# Patient Record
Sex: FEMALE | Race: WHITE | NOT HISPANIC OR LATINO | Employment: OTHER | URBAN - METROPOLITAN AREA
[De-identification: names, ages, dates, MRNs, and addresses within clinical notes are randomized per-mention and may not be internally consistent; named-entity substitution may affect disease eponyms.]

---

## 2017-05-23 ENCOUNTER — HOSPITAL ENCOUNTER (OUTPATIENT)
Dept: OTHER | Facility: HOSPITAL | Age: 67
Discharge: HOME | End: 2017-05-23
Attending: INTERNAL MEDICINE | Admitting: INTERNAL MEDICINE

## 2017-05-23 LAB
ANION GAP SERPL CALC-SCNC: 13.2 MMOL/L (ref 10–20)
BASOPHILS # BLD AUTO: 0.1 10*3/UL (ref 0–0.2)
BASOPHILS NFR BLD AUTO: 1 % (ref 0–2)
BUN SERPL-MCNC: 11 MG/DL (ref 8–20)
BUN/CREAT SERPL: 12.2 (ref 5.4–26.2)
CALCIUM SERPL-MCNC: 9.1 MG/DL (ref 8.9–10.3)
CHLORIDE SERPL-SCNC: 102 MMOL/L (ref 101–111)
CONV CO2: 26 MMOL/L (ref 22–32)
CREAT UR-MCNC: 0.9 MG/DL (ref 0.4–1)
DIFFERENTIAL METHOD BLD: (no result)
EOSINOPHIL # BLD AUTO: 0.1 10*3/UL (ref 0–0.3)
EOSINOPHIL # BLD AUTO: 2 % (ref 0–3)
ERYTHROCYTE [DISTWIDTH] IN BLOOD BY AUTOMATED COUNT: 14 % (ref 11.5–14.5)
GLUCOSE SERPL-MCNC: 132 MG/DL (ref 65–99)
HCT VFR BLD AUTO: 39.7 % (ref 35–49)
HGB BLD-MCNC: 13.2 G/DL (ref 12–15)
INR PPP: 1.2
INR PPP: 1.2 (ref 2–3)
LYMPHOCYTES # BLD AUTO: 1.5 10*3/UL (ref 0.8–4.8)
LYMPHOCYTES NFR BLD AUTO: 18 % (ref 18–42)
MCH RBC QN AUTO: 30.3 PG (ref 26–32)
MCHC RBC AUTO-ENTMCNC: 33.4 G/DL (ref 32–36)
MCV RBC AUTO: 90.9 FL (ref 80–94)
MONOCYTES # BLD AUTO: 0.9 10*3/UL (ref 0.1–1.3)
MONOCYTES NFR BLD AUTO: 11 % (ref 2–11)
NEUTROPHILS # BLD AUTO: 5.8 10*3/UL (ref 2.3–8.6)
NEUTROPHILS NFR BLD AUTO: 68 % (ref 50–75)
NRBC BLD AUTO-RTO: 0 /100{WBCS}
NRBC/RBC NFR BLD MANUAL: 0 10*3/UL
PLATELET # BLD AUTO: 271 10*3/UL (ref 150–450)
PMV BLD AUTO: 7.9 FL (ref 7.4–10.4)
POTASSIUM SERPL-SCNC: 4.2 MMOL/L (ref 3.6–5.1)
PROTHROMBIN TIME: 13.6 SEC (ref 19.4–28.5)
PROTHROMBIN TIME: 13.6 SEC (ref 9.6–11.7)
RBC # BLD AUTO: 4.37 10*6/UL (ref 4–5.4)
SODIUM SERPL-SCNC: 137 MMOL/L (ref 136–144)
WBC # BLD AUTO: 8.5 10*3/UL (ref 4.5–11.5)

## 2017-05-25 ENCOUNTER — OUTSIDE FACILITY SERVICE (OUTPATIENT)
Dept: CARDIAC SURGERY | Facility: CLINIC | Age: 67
End: 2017-05-25

## 2017-05-25 PROCEDURE — OUTSIDEPOS PR OUTSIDE POS PLACEHOLDER: Performed by: THORACIC SURGERY (CARDIOTHORACIC VASCULAR SURGERY)

## 2017-06-27 ENCOUNTER — TELEPHONE (OUTPATIENT)
Dept: CARDIAC SURGERY | Facility: CLINIC | Age: 67
End: 2017-06-27

## 2017-06-27 NOTE — TELEPHONE ENCOUNTER
Mrs Deleon's daughter called to report that her mother is running a low grade fever and is sick at her stomach. Her PCP is out of town and she does not wish to go to the emergency room. I suggested that they go to a local urgent care and at least have labs drawn. The family thinks it is her allergies but they do acknowledge these have not caused her to be sick at her stomach in the past. They will go to a local urgent care center today

## 2017-07-17 ENCOUNTER — OFFICE VISIT (OUTPATIENT)
Dept: CARDIAC SURGERY | Facility: CLINIC | Age: 67
End: 2017-07-17

## 2017-07-17 VITALS
DIASTOLIC BLOOD PRESSURE: 86 MMHG | OXYGEN SATURATION: 95 % | BODY MASS INDEX: 37.39 KG/M2 | TEMPERATURE: 97.6 F | RESPIRATION RATE: 20 BRPM | SYSTOLIC BLOOD PRESSURE: 141 MMHG | WEIGHT: 211 LBS | HEART RATE: 88 BPM | HEIGHT: 63 IN

## 2017-07-17 DIAGNOSIS — Z86.79 S/P MAZE OPERATION FOR ATRIAL FIBRILLATION: Primary | ICD-10-CM

## 2017-07-17 DIAGNOSIS — Z98.890 S/P MAZE OPERATION FOR ATRIAL FIBRILLATION: Primary | ICD-10-CM

## 2017-07-17 DIAGNOSIS — Z95.1 H/O CORONARY ARTERY BYPASS SURGERY: ICD-10-CM

## 2017-07-17 PROCEDURE — 99024 POSTOP FOLLOW-UP VISIT: CPT | Performed by: THORACIC SURGERY (CARDIOTHORACIC VASCULAR SURGERY)

## 2017-07-17 RX ORDER — OMEGA-3/DHA/EPA/FISH OIL 60 MG-90MG
500 CAPSULE ORAL
COMMUNITY

## 2017-07-17 RX ORDER — CARVEDILOL 25 MG/1
25 TABLET ORAL 2 TIMES DAILY WITH MEALS
COMMUNITY
End: 2020-01-04

## 2017-07-17 RX ORDER — DIGOXIN 125 MCG
125 TABLET ORAL
COMMUNITY
End: 2019-09-13

## 2017-07-17 RX ORDER — CETIRIZINE HYDROCHLORIDE 5 MG/1
10 TABLET ORAL DAILY
COMMUNITY

## 2017-07-17 RX ORDER — ASPIRIN 81 MG/1
81 TABLET ORAL DAILY
COMMUNITY

## 2017-07-17 RX ORDER — WARFARIN SODIUM 5 MG/1
5 TABLET ORAL
COMMUNITY
End: 2019-09-12

## 2017-07-17 RX ORDER — ALBUTEROL SULFATE 90 UG/1
2 AEROSOL, METERED RESPIRATORY (INHALATION) EVERY 4 HOURS PRN
COMMUNITY
End: 2020-09-10

## 2017-07-17 RX ORDER — AMLODIPINE BESYLATE 5 MG/1
5 TABLET ORAL DAILY
COMMUNITY
End: 2019-08-07 | Stop reason: SDUPTHER

## 2017-07-17 RX ORDER — GINKGO BILOBA LEAF EXTRACT 60 MG
CAPSULE ORAL
COMMUNITY
End: 2017-07-17 | Stop reason: ALTCHOICE

## 2017-07-17 RX ORDER — ATORVASTATIN CALCIUM 20 MG/1
20 TABLET, FILM COATED ORAL DAILY
COMMUNITY
End: 2019-09-12

## 2017-07-17 NOTE — PROGRESS NOTES
Mary Deleon is a 66 y.o. female s/p CABG/Maze. She denies any signs or symptoms of myocardial ischemi or cerebrovascular event. She did suffer a recent bout of pneumonia (requiring hospitalization), as well as a severe UTI. She has now completed her antibiotics. She is still on supplemental Oxygen.    Sternum is stable, incision is clean, dry, and intact.   CTA B/L.   Lower extremity incisions are clean, dry and intact.  GCS 15, no neurologic deficit.    She appears to be doing well. She plans to follow up with Dr. Powers, Dr. Jean Baptiste, and Dr. Issa.    Follow up with us will be on a PRN basis.

## 2017-08-10 ENCOUNTER — HOSPITAL ENCOUNTER (OUTPATIENT)
Dept: GENERAL RADIOLOGY | Facility: HOSPITAL | Age: 67
Discharge: HOME | End: 2017-08-10
Attending: INTERNAL MEDICINE | Admitting: INTERNAL MEDICINE

## 2017-08-15 ENCOUNTER — HOSPITAL ENCOUNTER (OUTPATIENT)
Dept: SLEEP MEDICINE | Facility: HOSPITAL | Age: 67
Discharge: HOME | End: 2017-08-15
Attending: INTERNAL MEDICINE | Admitting: INTERNAL MEDICINE

## 2018-07-05 ENCOUNTER — HOSPITAL ENCOUNTER (OUTPATIENT)
Dept: OTHER | Facility: HOSPITAL | Age: 68
Discharge: HOME | End: 2018-07-05
Attending: ORTHOPAEDIC SURGERY | Admitting: ORTHOPAEDIC SURGERY

## 2018-12-31 ENCOUNTER — HOSPITAL ENCOUNTER (OUTPATIENT)
Dept: PREADMISSION TESTING | Facility: HOSPITAL | Age: 68
Discharge: HOME | End: 2018-12-31
Attending: INTERNAL MEDICINE | Admitting: INTERNAL MEDICINE

## 2018-12-31 LAB
ALBUMIN SERPL-MCNC: 3.6 G/DL (ref 3.5–4.8)
ALBUMIN/GLOB SERPL: 1.1 {RATIO} (ref 1–1.7)
ALP SERPL-CCNC: 48 IU/L (ref 32–91)
ALT SERPL-CCNC: 25 IU/L (ref 14–54)
ANION GAP SERPL CALC-SCNC: 14.2 MMOL/L (ref 10–20)
AST SERPL-CCNC: 21 IU/L (ref 15–41)
BASOPHILS # BLD AUTO: 0.1 10*3/UL (ref 0–0.2)
BASOPHILS NFR BLD AUTO: 1 % (ref 0–2)
BILIRUB SERPL-MCNC: 0.6 MG/DL (ref 0.3–1.2)
BUN SERPL-MCNC: 17 MG/DL (ref 8–20)
BUN/CREAT SERPL: 18.9 (ref 5.4–26.2)
CALCIUM SERPL-MCNC: 9.2 MG/DL (ref 8.9–10.3)
CHLORIDE SERPL-SCNC: 103 MMOL/L (ref 101–111)
CONV CO2: 25 MMOL/L (ref 22–32)
CONV TOTAL PROTEIN: 6.8 G/DL (ref 6.1–7.9)
CREAT UR-MCNC: 0.9 MG/DL (ref 0.4–1)
DIFFERENTIAL METHOD BLD: (no result)
EOSINOPHIL # BLD AUTO: 0.1 10*3/UL (ref 0–0.3)
EOSINOPHIL # BLD AUTO: 1 % (ref 0–3)
ERYTHROCYTE [DISTWIDTH] IN BLOOD BY AUTOMATED COUNT: 13.4 % (ref 11.5–14.5)
GLOBULIN UR ELPH-MCNC: 3.2 G/DL (ref 2.5–3.8)
GLUCOSE SERPL-MCNC: 104 MG/DL (ref 65–99)
HCT VFR BLD AUTO: 40.6 % (ref 35–49)
HGB BLD-MCNC: 13.4 G/DL (ref 12–15)
INR PPP: 1.7 (ref 2–3)
LYMPHOCYTES # BLD AUTO: 1.5 10*3/UL (ref 0.8–4.8)
LYMPHOCYTES NFR BLD AUTO: 17 % (ref 18–42)
MAGNESIUM SERPL-MCNC: 2 MG/DL (ref 1.8–2.5)
MCH RBC QN AUTO: 30.6 PG (ref 26–32)
MCHC RBC AUTO-ENTMCNC: 33.1 G/DL (ref 32–36)
MCV RBC AUTO: 92.4 FL (ref 80–94)
MONOCYTES # BLD AUTO: 0.9 10*3/UL (ref 0.1–1.3)
MONOCYTES NFR BLD AUTO: 11 % (ref 2–11)
NEUTROPHILS # BLD AUTO: 6.3 10*3/UL (ref 2.3–8.6)
NEUTROPHILS NFR BLD AUTO: 70 % (ref 50–75)
NRBC BLD AUTO-RTO: 0 /100{WBCS}
NRBC/RBC NFR BLD MANUAL: 0 10*3/UL
PLATELET # BLD AUTO: 255 10*3/UL (ref 150–450)
PMV BLD AUTO: 8 FL (ref 7.4–10.4)
POTASSIUM SERPL-SCNC: 4.2 MMOL/L (ref 3.6–5.1)
PROTHROMBIN TIME: 16.8 SEC (ref 19.4–28.5)
RBC # BLD AUTO: 4.39 10*6/UL (ref 4–5.4)
SODIUM SERPL-SCNC: 138 MMOL/L (ref 136–144)
TSH SERPL-ACNC: 4.8 UIU/ML (ref 0.34–5.6)
WBC # BLD AUTO: 9 10*3/UL (ref 4.5–11.5)

## 2019-04-01 ENCOUNTER — HOSPITAL ENCOUNTER (OUTPATIENT)
Dept: RHEUMATOLOGY | Facility: CLINIC | Age: 69
Discharge: HOME | End: 2019-04-01
Attending: INTERNAL MEDICINE | Admitting: INTERNAL MEDICINE

## 2019-04-05 ENCOUNTER — HOSPITAL ENCOUNTER (OUTPATIENT)
Dept: LAB | Facility: HOSPITAL | Age: 69
Discharge: HOME | End: 2019-04-05
Attending: INTERNAL MEDICINE | Admitting: INTERNAL MEDICINE

## 2019-04-05 LAB
ALBUMIN SERPL-MCNC: 3.7 G/DL (ref 3.5–4.8)
ALBUMIN/GLOB SERPL: 1.2 {RATIO} (ref 1–1.7)
ALP SERPL-CCNC: 55 IU/L (ref 32–91)
ALT SERPL-CCNC: 27 IU/L (ref 14–54)
ANION GAP SERPL CALC-SCNC: 16.1 MMOL/L (ref 10–20)
AST SERPL-CCNC: 21 IU/L (ref 15–41)
BASOPHILS # BLD AUTO: 0.2 10*3/UL (ref 0–0.2)
BASOPHILS NFR BLD AUTO: 3 % (ref 0–2)
BILIRUB SERPL-MCNC: 0.5 MG/DL (ref 0.3–1.2)
BILIRUB UR QL STRIP: NEGATIVE MG/DL
BUN SERPL-MCNC: 17 MG/DL (ref 8–20)
BUN/CREAT SERPL: 18.9 (ref 5.4–26.2)
CALCIUM SERPL-MCNC: 9.3 MG/DL (ref 8.9–10.3)
CASTS URNS QL MICRO: NORMAL /[LPF]
CHLORIDE SERPL-SCNC: 100 MMOL/L (ref 101–111)
COLOR UR: YELLOW
CONV BACTERIA IN URINE MICRO: NEGATIVE
CONV CLARITY OF URINE: CLEAR
CONV CO2: 26 MMOL/L (ref 22–32)
CONV HYALINE CASTS IN URINE MICRO: 1 /[LPF] (ref 0–5)
CONV PROTEIN IN URINE BY AUTOMATED TEST STRIP: NEGATIVE MG/DL
CONV SMALL ROUND CELLS: NORMAL /[HPF]
CONV TOTAL PROTEIN: 6.9 G/DL (ref 6.1–7.9)
CONV UROBILINOGEN IN URINE BY AUTOMATED TEST STRIP: 0.2 MG/DL
CREAT UR-MCNC: 0.9 MG/DL (ref 0.4–1)
CRP SERPL-MCNC: 0.12 MG/DL (ref 0–0.7)
CULTURE INDICATED?: NORMAL
DIFFERENTIAL METHOD BLD: (no result)
EOSINOPHIL # BLD AUTO: 0.1 10*3/UL (ref 0–0.3)
EOSINOPHIL # BLD AUTO: 2 % (ref 0–3)
ERYTHROCYTE [DISTWIDTH] IN BLOOD BY AUTOMATED COUNT: 15.5 % (ref 11.5–14.5)
ERYTHROCYTE [SEDIMENTATION RATE] IN BLOOD BY WESTERGREN METHOD: NORMAL MM/HR (ref 0–30)
GLOBULIN UR ELPH-MCNC: 3.2 G/DL (ref 2.5–3.8)
GLUCOSE SERPL-MCNC: 129 MG/DL (ref 65–99)
GLUCOSE UR QL: NEGATIVE MG/DL
HCT VFR BLD AUTO: 40.3 % (ref 35–49)
HGB BLD-MCNC: 13.1 G/DL (ref 12–15)
HGB UR QL STRIP: NEGATIVE
KETONES UR QL STRIP: NEGATIVE MG/DL
LEUKOCYTE ESTERASE UR QL STRIP: NEGATIVE
LYMPHOCYTES # BLD AUTO: 1.8 10*3/UL (ref 0.8–4.8)
LYMPHOCYTES NFR BLD AUTO: 25 % (ref 18–42)
MCH RBC QN AUTO: 29.6 PG (ref 26–32)
MCHC RBC AUTO-ENTMCNC: 32.6 G/DL (ref 32–36)
MCV RBC AUTO: 90.8 FL (ref 80–94)
MONOCYTES # BLD AUTO: 0.7 10*3/UL (ref 0.1–1.3)
MONOCYTES NFR BLD AUTO: 10 % (ref 2–11)
NEUTROPHILS # BLD AUTO: 4.4 10*3/UL (ref 2.3–8.6)
NEUTROPHILS NFR BLD AUTO: 60 % (ref 50–75)
NITRITE UR QL STRIP: NEGATIVE
NRBC BLD AUTO-RTO: 0 /100{WBCS}
NRBC/RBC NFR BLD MANUAL: 0 10*3/UL
PH UR STRIP.AUTO: 7 [PH] (ref 4.5–8)
PLATELET # BLD AUTO: 273 10*3/UL (ref 150–450)
PMV BLD AUTO: 8.5 FL (ref 7.4–10.4)
POTASSIUM SERPL-SCNC: 4.1 MMOL/L (ref 3.6–5.1)
RBC # BLD AUTO: 4.44 10*6/UL (ref 4–5.4)
RBC #/AREA URNS HPF: 1 /[HPF] (ref 0–3)
SODIUM SERPL-SCNC: 138 MMOL/L (ref 136–144)
SP GR UR: 1.02 (ref 1–1.03)
SPERM URNS QL MICRO: NORMAL /[HPF]
SQUAMOUS SPT QL MICRO: 1 /[HPF] (ref 0–5)
UNIDENT CRYS URNS QL MICRO: NORMAL /[HPF]
WBC # BLD AUTO: 7.2 10*3/UL (ref 4.5–11.5)
WBC #/AREA URNS HPF: 1 /[HPF] (ref 0–5)
YEAST SPEC QL WET PREP: NORMAL /[HPF]

## 2019-04-08 LAB — CCP IGG ANTIBODIES: <0.4 U/ML

## 2019-04-22 ENCOUNTER — HOSPITAL ENCOUNTER (OUTPATIENT)
Dept: PREADMISSION TESTING | Facility: HOSPITAL | Age: 69
Discharge: HOME | End: 2019-04-22
Attending: INTERNAL MEDICINE | Admitting: INTERNAL MEDICINE

## 2019-04-22 LAB
ALBUMIN SERPL-MCNC: 3.7 G/DL (ref 3.5–4.8)
ALBUMIN/GLOB SERPL: 1.2 {RATIO} (ref 1–1.7)
ALP SERPL-CCNC: 49 IU/L (ref 32–91)
ALT SERPL-CCNC: 19 IU/L (ref 14–54)
ANION GAP SERPL CALC-SCNC: 14.2 MMOL/L (ref 10–20)
AST SERPL-CCNC: 21 IU/L (ref 15–41)
BASOPHILS # BLD AUTO: 0.1 10*3/UL (ref 0–0.2)
BASOPHILS NFR BLD AUTO: 1 % (ref 0–2)
BILIRUB SERPL-MCNC: 0.5 MG/DL (ref 0.3–1.2)
BUN SERPL-MCNC: 21 MG/DL (ref 8–20)
BUN/CREAT SERPL: 23.3 (ref 5.4–26.2)
CALCIUM SERPL-MCNC: 9.3 MG/DL (ref 8.9–10.3)
CHLORIDE SERPL-SCNC: 100 MMOL/L (ref 101–111)
CONV CO2: 27 MMOL/L (ref 22–32)
CONV TOTAL PROTEIN: 6.8 G/DL (ref 6.1–7.9)
CREAT UR-MCNC: 0.9 MG/DL (ref 0.4–1)
DIFFERENTIAL METHOD BLD: (no result)
EOSINOPHIL # BLD AUTO: 0.1 10*3/UL (ref 0–0.3)
EOSINOPHIL # BLD AUTO: 1 % (ref 0–3)
ERYTHROCYTE [DISTWIDTH] IN BLOOD BY AUTOMATED COUNT: 15.3 % (ref 11.5–14.5)
GLOBULIN UR ELPH-MCNC: 3.1 G/DL (ref 2.5–3.8)
GLUCOSE SERPL-MCNC: 86 MG/DL (ref 65–99)
HCT VFR BLD AUTO: 40.6 % (ref 35–49)
HGB BLD-MCNC: 13.5 G/DL (ref 12–15)
INR PPP: 1.5 (ref 2–3)
LYMPHOCYTES # BLD AUTO: 2 10*3/UL (ref 0.8–4.8)
LYMPHOCYTES NFR BLD AUTO: 27 % (ref 18–42)
MCH RBC QN AUTO: 30 PG (ref 26–32)
MCHC RBC AUTO-ENTMCNC: 33.2 G/DL (ref 32–36)
MCV RBC AUTO: 90.4 FL (ref 80–94)
MONOCYTES # BLD AUTO: 0.9 10*3/UL (ref 0.1–1.3)
MONOCYTES NFR BLD AUTO: 12 % (ref 2–11)
NEUTROPHILS # BLD AUTO: 4.3 10*3/UL (ref 2.3–8.6)
NEUTROPHILS NFR BLD AUTO: 59 % (ref 50–75)
NRBC BLD AUTO-RTO: 0 /100{WBCS}
NRBC/RBC NFR BLD MANUAL: 0 10*3/UL
PLATELET # BLD AUTO: 272 10*3/UL (ref 150–450)
PMV BLD AUTO: 7.7 FL (ref 7.4–10.4)
POTASSIUM SERPL-SCNC: 4.2 MMOL/L (ref 3.6–5.1)
PROTHROMBIN TIME: 15.1 SEC (ref 19.4–28.5)
RBC # BLD AUTO: 4.49 10*6/UL (ref 4–5.4)
SODIUM SERPL-SCNC: 137 MMOL/L (ref 136–144)
WBC # BLD AUTO: 7.3 10*3/UL (ref 4.5–11.5)

## 2019-05-30 ENCOUNTER — CONVERSION ENCOUNTER (OUTPATIENT)
Dept: CARDIOLOGY | Facility: CLINIC | Age: 69
End: 2019-05-30

## 2019-06-04 VITALS
SYSTOLIC BLOOD PRESSURE: 124 MMHG | BODY MASS INDEX: 35.96 KG/M2 | WEIGHT: 203 LBS | DIASTOLIC BLOOD PRESSURE: 77 MMHG | OXYGEN SATURATION: 96 % | HEART RATE: 67 BPM

## 2019-06-06 NOTE — PROGRESS NOTES
Visit Type:  Follow-up Visit  Primary Care Provider:  Yuliana Issa NP    Chief Complaint:  Follow-Up afer Ablation.    History of Present Illness:  68 -year-old white female patient with known history of obesity,  hypertension and dyslipidemia,  most probably chronic atrial fibrillation,  underwent stress Myoview 2016 showed no ischemia     echocardiogram 2016 showed LV function is normal   borderline biatrial enlargement   mitral annular calcification with mild mitral insufficiency   no aortic stenosis   no pulmonary hypertension    Due to the recurrent symptoms patient underwent right and left cardiac catheterization 2017  Patient found to have proximal LAD 70% mid LAD 70% obtuse marginal branch ostium 80% RCA ostium 80%  Patient underwent lima to LAD vein graft to the PDA and obtuse marginal branch Maze procedure    Patient was started on amiodarone and underwent cardioversion   patient went into sinus rhythm but significant sinus bradycardia so she stopped the amiodarone and digoxin  Due to recurrent AFib patient underwent ablation 2nd time in 2019   transesophageal echocardiogram was done  2019   showed moderate LVH bilateral atrial enlargement normal EF   left atrial appendage is patent   recent EKG normal sinus rhythm patient is maintaining sinus rhythm   patient is doing much better   advise patient to control the sleep apnea aggressively                  Vital Signs:    Patient Profile:    68 Years Old Female  Height:     63 inches (160.02 cm)  Weight:     203 pounds  (Measured Weight:  203 lbs.  oz.)  BMI:        35.96  Pulse rate: 67 / minute  O2 Sat:     96 %  Room Air:   room air without exertion    BP sittin / 77  (left arm)  Cuff size:  regular   Vitals Entered By: Dolly Murrieta CMA (May 30, 2019 11:14 AM)    Medications:  Medications were reviewed with the patient during this visit.    Allergies:   SULFA (Critical)  NAPROSYN (Critical)  * TYLENOL  (Critical)    Allergies were reviewed with the patient during this visit.    Current Allergies (reviewed today):  SULFA (Critical)  NAPROSYN (Critical)  * TYLENOL (Critical)    Current Medications (including medications started today):   METOPROLOL SUCCINATE ER 25 MG ORAL TABLET EXTENDED RELEASE 24 HOUR (METOPROLOL SUCCINATE) Take one  tablet by mouth twice daily  KEFLEX 500 MG ORAL CAPSULE (CEPHALEXIN) 1 tab every 8 hours untile gone.  BREO ELLIPTA 100-25 MCG/INH INHALATION AEROSOL POWDER BREATH ACTIVATED (FLUTICASONE FUROATE-VILANTEROL) One inhalation once daily  ZYRTEC ALLERGY CAPSULE (CETIRIZINE HCL CAPS) prn  METFORMIN  MG ORAL TABLET (METFORMIN HCL) Take one (1) tablet by mouth twice a day  SPIRIVA HANDIHALER 18 MCG INHALATION CAPSULE (TIOTROPIUM BROMIDE MONOHYDRATE) as directed  COLACE CAPSULE (DOCUSATE SODIUM CAPS) as directed  WARFARIN SODIUM 3 MG ORAL TABLET (WARFARIN SODIUM) Take 1 tablet by mouth Sun, Thur, and Sat  FISH OIL 1000 MG ORAL CAPSULE (OMEGA-3 FATTY ACIDS) Take 1 tablet by mouth daily  ATORVASTATIN 40MG   TAB (ATORVASTATIN CALCIUM) TAKE 1 TABLET BY MOUTH AT BEDTIME  WARFARIN SODIUM 4 MG ORAL TABLET (WARFARIN SODIUM) All other days as directed  AMLODIPINE BESYLATE 5 MG ORAL TABLET (AMLODIPINE BESYLATE) Take 1 tablet by mouth daily  CLARITIN 10 MG ORAL TABLET (LORATADINE) Take 1 tablet by mouth daily  ASPIR-LOW 81 MG ORAL TABLET DELAYED RELEASE (ASPIRIN) Take 1 tablet by mouth daily      Past Medical History:     Reviewed history from 10/18/2018 and no changes required:        Hypertension        Dyslipidemia        Coronary Artery Disease (05/25/2017)        C O P D        Chronic-Atrial Fibrillation        S/P CABG x3 Vessels        Atrial Flutter    Past Surgical History:     Reviewed history from 01/24/2019 and no changes required:        Cardiac Cath 2003  with narrowing distal LAD and prox Cx.         Hyster and BSO        Torn miniscus on left knee x2        Heart Catherization  (05/24/2017)        C A B G: x3 Vessels, LIMA to LAD, SVG to PDA & SVG to OM3 (05/25/2017)        Cardioversion 9/24/18         Ablation 1/2/19 Dr. MAST     Family History Summary:      Reviewed history Last on 05/03/2019 and no changes required:05/31/2019  Brother - Has Family History of Lung Cancer - Entered On: 5/25/2016  Nephew - Has Family History of Lung Cancer - Entered On: 5/25/2016  Uncle - Has Family History of Other Cancer - Entered On: 5/25/2016  Aunt - Has Family History of High Cholesterol - Entered On: 5/25/2016  Aunt - Has Family History of Hypertension - Entered On: 5/25/2016  Mother - Has Family History of High Cholesterol - Entered On: 5/25/2016  Mother - Has Family History of Hypertension - Entered On: 5/25/2016  MGM - Has Family History of High Cholesterol - Entered On: 5/25/2016  MGM - Has Family History of Hypertension - Entered On: 5/25/2016  Uncle - Has Family History of High Cholesterol - Entered On: 5/25/2016  Uncle - Has Family History of Hypertension - Entered On: 5/25/2016  Half Sister - Has Family History of High Cholesterol - Entered On: 5/25/2016  Half Sister - Has Family History of Hypertension - Entered On: 5/25/2016  Half Sister - Has Family History of Heart Disease - Entered On: 5/25/2016  Uncle - Has Family History of Heart Disease - Entered On: 5/25/2016  Aunt - Has Family History of Heart Disease - Entered On: 5/25/2016  Mother - Has Family History of Heart Disease - Entered On: 5/25/2016  MGM - Has Family History of Heart Disease - Entered On: 5/25/2016      Social History:     Reviewed history from 05/03/2019 and no changes required:        Patient is a former smoker.        Passive Smoke: Y        Alcohol Use: N        Drug Use: N        HIV/High Risk: N        Regular Exercise: N        Hx Domestic Abuse: N        Protestant Affecting Care: N                        Retired        Risk Factors:     Smoked Tobacco Use:  Former smoker     Cigarettes:  Yes -- <1ppd pack(s)  per day,      Year started:  1971        Year quit:  1980        Years Since Last Quit:  39  Smokeless Tobacco Use:  Never  Passive smoke exposure:  yes  Drug use:  no  HIV high-risk behavior:  no  Caffeine use:  <1 drinks per day  Alcohol use:  no  Exercise:  no  Seatbelt use:  100 %  Sun Exposure:  occasionally    Family History Risk Factors:     Family History of MI in females < 65 years old:  yes     Family History of MI in males < 55 years old:  yes        Review of Systems   General: No fatigue or tiredness,+ Occassional lightheadedness/dizziness upon standing  Eyes: No redness  Ear/Nose/Throat: No discharge  Cardiovascular: No chest pain, + Occassional flutter with dizziness  Respiratory: + shortness of breath with and w/o exertion  Gastrointestinal: No nausea or vomiting  Genitourinary: No Bleeding  Musculoskeletal: No arthralgia or myalgia  Skin: No rash  Neurologic: No numbness or tingling  Psychiatric: No anxiety or depression  Hematologic/Lymphatic: No abnormal bleeding      Physical Exam    General:      well developed, well nourished, in no acute distress.    Head:      normocephalic and atraumatic.    Eyes:      conjunctiva and sclera clear with out nystagmus.    Mouth:      no deformity or lesions.  Neck:      no masses, thyromegaly, TRACHEA CENTRAL WITH NORMAL RESPIRATORY EFFORT  Lungs:      clear bilaterally to auscultation.    Heart:      non-displaced PMI, chest non-tender; regular rate and rhythm, S1, S2 without murmurs, rubs, or gallops  Abdomen:      Soft, nontender, nondistended bowel sounds are present and normoactive.  Msk:      kyphosis.    Pulses:      pulses normal in all 4 extremities.    Extremities:      no pedal edema.   no cyanosis or clubbing  Neurologic:      no focal deficits, alert and oriented x3  Skin:       left forearm superficial phlebitis noted  Psych:      alert and cooperative; normal mood and affect; normal attention span and concentration.      Diabetes Management  Exam:      Foot Exam (with socks and/or shoes not present):        Pulses:           pulses normal in all 4 extremities.        Blood Pressure:  Today's BP: 124/77 mm Hg            Impression & Recommendations:    Problem # 1:  ATRIAL FIBRILLATION (ICD-427.31) (BZV84-Z48.0)   status post ablation stable  Her updated medication list for this problem includes:     Metoprolol Succinate Er 25 Mg Oral Tablet Extended Release 24 Hour (Metoprolol succinate) ..... Take one  tablet by mouth twice daily     Warfarin Sodium 3 Mg Oral Tablet (Warfarin sodium) ..... Take 1 tablet by mouth sun, thur, and sat     Warfarin Sodium 4 Mg Oral Tablet (Warfarin sodium) ..... All other days as directed     Aspir-low 81 Mg Oral Tablet Delayed Release (Aspirin) ..... Take 1 tablet by mouth daily    Orders:  [id number] Vst-Est Level III (CPT-87115)      Problem # 2:  Coronary Artery Disease (ICD-414.00) (VRM50-R23.10)   status post CABG stable  Her updated medication list for this problem includes:     Metoprolol Succinate Er 25 Mg Oral Tablet Extended Release 24 Hour (Metoprolol succinate) ..... Take one  tablet by mouth twice daily     Amlodipine Besylate 5 Mg Oral Tablet (Amlodipine besylate) ..... Take 1 tablet by mouth daily     Aspir-low 81 Mg Oral Tablet Delayed Release (Aspirin) ..... Take 1 tablet by mouth daily    Orders:  [id number] Vst-Est Level III (CPT-35339)      Problem # 3:  HTN (ICD-401.9) (DDA70-U55)   continue aggressive blood pressure control  Her updated medication list for this problem includes:     Metoprolol Succinate Er 25 Mg Oral Tablet Extended Release 24 Hour (Metoprolol succinate) ..... Take one  tablet by mouth twice daily     Amlodipine Besylate 5 Mg Oral Tablet (Amlodipine besylate) ..... Take 1 tablet by mouth daily    Orders:  [id number] Vst-Est Level III (CPT-32380)      Medications Added to Medication List This Visit:  1)  Metoprolol Succinate Er 25 Mg Oral Tablet Extended Release 24 Hour (Metoprolol  succinate) .... Take one  tablet by mouth twice daily                  Medication Administration    Orders Added:  1)  16135-[id number] Vst-Est Level III [CPT-35322]  ]      Electronically signed by Al Garcia MD on 05/31/2019 at 10:02 AM  ________________________________________________________________________       Disclaimer: Converted Note message may not contain all data elements that existed in the legacy source system. Please see [employer] for the original note details.

## 2019-07-09 ENCOUNTER — TELEPHONE (OUTPATIENT)
Dept: CARDIOLOGY | Facility: CLINIC | Age: 69
End: 2019-07-09

## 2019-07-09 RX ORDER — METOPROLOL SUCCINATE 25 MG/1
25 TABLET, EXTENDED RELEASE ORAL 2 TIMES DAILY
Qty: 60 TABLET | Refills: 4 | Status: SHIPPED | OUTPATIENT
Start: 2019-07-09 | End: 2019-09-12

## 2019-07-09 NOTE — TELEPHONE ENCOUNTER
Patient requesting medication refill for metoprolol succinate 25 mg BId to be sent to [facility] in [place].  Metoprolol succinate 25 mg BID dose confirmed from Dr. Rios's most recent office note from 06/07/19.

## 2019-08-07 RX ORDER — AMLODIPINE BESYLATE 5 MG/1
TABLET ORAL
Qty: 90 TABLET | Refills: 3 | Status: SHIPPED | OUTPATIENT
Start: 2019-08-07 | End: 2020-08-04

## 2019-08-13 ENCOUNTER — OFFICE VISIT (OUTPATIENT)
Dept: RHEUMATOLOGY | Facility: CLINIC | Age: 69
End: 2019-08-13

## 2019-08-13 VITALS
BODY MASS INDEX: 36.14 KG/M2 | DIASTOLIC BLOOD PRESSURE: 75 MMHG | WEIGHT: 204 LBS | HEART RATE: 80 BPM | SYSTOLIC BLOOD PRESSURE: 124 MMHG | HEIGHT: 63 IN

## 2019-08-13 DIAGNOSIS — I48.92 ATRIAL FLUTTER, UNSPECIFIED TYPE (HCC): ICD-10-CM

## 2019-08-13 DIAGNOSIS — M15.4 EROSIVE OSTEOARTHRITIS: Primary | ICD-10-CM

## 2019-08-13 PROBLEM — J44.9 CHRONIC OBSTRUCTIVE PULMONARY DISEASE: Status: ACTIVE | Noted: 2019-08-13

## 2019-08-13 PROBLEM — R50.9 FEVER: Status: ACTIVE | Noted: 2017-06-27

## 2019-08-13 PROBLEM — R11.2 NAUSEA AND VOMITING: Status: ACTIVE | Noted: 2017-06-27

## 2019-08-13 PROBLEM — Z95.1 PRESENCE OF AORTOCORONARY BYPASS GRAFT: Status: ACTIVE | Noted: 2017-05-25

## 2019-08-13 PROBLEM — I20.0 UNSTABLE ANGINA PECTORIS: Status: ACTIVE | Noted: 2017-05-18

## 2019-08-13 PROBLEM — R06.09 DYSPNEA ON EXERTION: Status: ACTIVE | Noted: 2017-05-18

## 2019-08-13 PROBLEM — I25.10 CORONARY ARTERY DISEASE: Status: ACTIVE | Noted: 2017-05-25

## 2019-08-13 PROBLEM — L03.90 CELLULITIS: Status: ACTIVE | Noted: 2019-02-18

## 2019-08-13 PROCEDURE — 99213 OFFICE O/P EST LOW 20 MIN: CPT | Performed by: INTERNAL MEDICINE

## 2019-08-13 RX ORDER — BLOOD SUGAR DIAGNOSTIC
STRIP MISCELLANEOUS
Refills: 3 | COMMUNITY
Start: 2019-06-27

## 2019-08-13 RX ORDER — WARFARIN SODIUM 4 MG/1
TABLET ORAL
Refills: 1 | COMMUNITY
Start: 2019-05-14 | End: 2020-01-04

## 2019-08-13 RX ORDER — LORATADINE 10 MG/1
TABLET ORAL
COMMUNITY
Start: 2016-05-25 | End: 2019-09-12

## 2019-08-13 RX ORDER — ATORVASTATIN CALCIUM 40 MG/1
40 TABLET, FILM COATED ORAL
Refills: 1 | COMMUNITY
Start: 2019-07-07 | End: 2019-10-03 | Stop reason: SDUPTHER

## 2019-08-13 RX ORDER — TIOTROPIUM BROMIDE INHALATION SPRAY 1.56 UG/1
2 SPRAY, METERED RESPIRATORY (INHALATION) DAILY
Refills: 1 | COMMUNITY
Start: 2019-07-19

## 2019-08-13 RX ORDER — WARFARIN SODIUM 3 MG/1
3 TABLET ORAL DAILY
Refills: 3 | COMMUNITY
Start: 2019-05-22 | End: 2020-01-04

## 2019-08-13 NOTE — PROGRESS NOTES
This is a pleasant 68-year-old female who comes today in follow-up for review of work-up ordered for 2019.  At the moment, the patient had signs and symptoms compatible with erosive osteoarthritis.  Laboratory work-up was for the most part unremarkable.  X-rays of the hands were positive for signs of erosive osteoarthritis.  MRI was reviewed on prior visit.    Today the patient is doing overall well, denies major joint pain, joint tenderness, joint swelling or morning stiffness.  In July, she was walking when she twisted her left ankle and  It continues to be painful but otherwise there has not been any new events.    She denies fever or chills, no chest pain, shortness of breath no cough, no abdominal pain, nausea, vomiting, diarrhea constipation.  All other systems reviewed and they were negative.    No changes in social or family history.    Active Ambulatory Problems     Diagnosis Date Noted   • Cellulitis 02/18/2019   • Chronic obstructive pulmonary disease (CMS/HCC) 08/13/2019   • Coronary artery disease 05/25/2017   • Dyslipidemia 05/19/2016   • Dyspnea on exertion 05/18/2017   • Erosive osteoarthritis 04/01/2019   • Fever 06/27/2017   • Hypertension 05/19/2016   • Nausea and vomiting 06/27/2017   • Obesity 05/25/2016   • Atrial flutter (CMS/HCC) 10/18/2018   • Permanent atrial fibrillation (CMS/[facility]) 05/19/2016   • Presence of aortocoronary bypass graft 05/25/2017   • Unstable angina pectoris (CMS/HCC) 05/18/2017     Resolved Ambulatory Problems     Diagnosis Date Noted   • No Resolved Ambulatory Problems     Past Medical History:   Diagnosis Date   • Arthritis    • Atrial fibrillation (CMS/[facility])    • COPD (chronic obstructive pulmonary disease) (CMS/[facility])    • Coronary artery disease    • Depression    • Diverticulitis    • Hyperlipidemia    • Hypertension    • Mitral valve prolapse        Vitals:    08/13/19 1158   BP: 124/75   Pulse: 80       Physical exam:  GENERAL: Well-developed, well-nourished in no  acute distress. Alert and oriented x3.  HEENT: Normocephalic, atraumatic. Pupils are equal, round, and reactive to light. Extraocular muscles are intact. Mucous membranes are pink and moist. Nostrils are clear.   NECK: Supple without lymphadenopathy.  LUNGS: Clear to auscultation bilaterally.  HEART: Regular rate and rhythm without murmur, rub or gallop.  CHEST: Respirations easy and unlabored.  EXTREMITIES: No cyanosis, edema or clubbing.  SKIN: Warm, dry and intact.  MSK: Prominent Heberden's and Lizette nodes, CMC squaring.  Superficial of the PIPs.    Assessment:  1. Erosive osteoarthritis.  Doing well.  Recommend compounded cream to be applied twice daily when more symptomatic, okay to take NSAIDs only on a as needed basis.  RTC 6 months or sooner if needed.

## 2019-08-15 ENCOUNTER — TELEPHONE (OUTPATIENT)
Dept: RHEUMATOLOGY | Facility: CLINIC | Age: 69
End: 2019-08-15

## 2019-08-15 NOTE — TELEPHONE ENCOUNTER
Patient states she was in the office on Tuesday and was told a cream would be called in for her hand pain. She states that someone called her wanting her social security number to mail the RX- She did not give them this information. I advised not to give personal information over the phone and if a pharmacy calls her to have them call this office. Can someone check to see if this cream was called in? Please call her at [phone]

## 2019-08-19 NOTE — TELEPHONE ENCOUNTER
Spoke with patient and called the cream company and gave them the information they needed. Patient notified and will get shipment in the mail.

## 2019-08-28 ENCOUNTER — TELEPHONE (OUTPATIENT)
Dept: RHEUMATOLOGY | Facility: CLINIC | Age: 69
End: 2019-08-28

## 2019-08-28 NOTE — TELEPHONE ENCOUNTER
Pt called and stated that she was supposed to receive a cream in the mail and she still hasnt heard anything from the pharmacy about this please advise

## 2019-08-30 NOTE — TELEPHONE ENCOUNTER
Phone note 8/15/19, States that this order was already taken care of by Amy.   Amy, can you please check why she has not received her cream yet? Please contact the pharmacy. Please contact the patient once you have information about this issue.

## 2019-09-12 ENCOUNTER — OFFICE VISIT (OUTPATIENT)
Dept: CARDIOLOGY | Facility: CLINIC | Age: 69
End: 2019-09-12

## 2019-09-12 VITALS
OXYGEN SATURATION: 97 % | BODY MASS INDEX: 36.96 KG/M2 | SYSTOLIC BLOOD PRESSURE: 115 MMHG | HEART RATE: 73 BPM | HEIGHT: 63 IN | WEIGHT: 208.6 LBS | DIASTOLIC BLOOD PRESSURE: 69 MMHG

## 2019-09-12 DIAGNOSIS — G47.30 SLEEP APNEA IN ADULT: ICD-10-CM

## 2019-09-12 DIAGNOSIS — E78.2 MIXED HYPERLIPIDEMIA: ICD-10-CM

## 2019-09-12 DIAGNOSIS — I25.10 CORONARY ARTERY DISEASE INVOLVING NATIVE CORONARY ARTERY OF NATIVE HEART WITHOUT ANGINA PECTORIS: Primary | ICD-10-CM

## 2019-09-12 DIAGNOSIS — I10 ESSENTIAL HYPERTENSION: ICD-10-CM

## 2019-09-12 DIAGNOSIS — I48.0 PAROXYSMAL ATRIAL FIBRILLATION (HCC): ICD-10-CM

## 2019-09-12 PROCEDURE — 99214 OFFICE O/P EST MOD 30 MIN: CPT | Performed by: INTERNAL MEDICINE

## 2019-09-12 RX ORDER — TRAMADOL HYDROCHLORIDE 50 MG/1
TABLET ORAL
COMMUNITY
End: 2020-01-04

## 2019-09-12 RX ORDER — PYRIDOXINE HCL (VITAMIN B6) 50 MG
TABLET ORAL
COMMUNITY
End: 2019-09-13

## 2019-09-12 RX ORDER — CHLORAL HYDRATE 500 MG
CAPSULE ORAL
COMMUNITY
End: 2020-01-04

## 2019-09-12 RX ORDER — AMOXICILLIN 875 MG/1
TABLET, COATED ORAL EVERY 12 HOURS SCHEDULED
COMMUNITY
End: 2019-09-13

## 2019-09-12 RX ORDER — ESTRADIOL 0.03 MG/D
FILM, EXTENDED RELEASE TRANSDERMAL
COMMUNITY
End: 2019-09-13

## 2019-09-12 RX ORDER — AMIODARONE HYDROCHLORIDE 200 MG/1
TABLET ORAL
COMMUNITY
End: 2019-09-13

## 2019-09-12 RX ORDER — LISINOPRIL 20 MG/1
TABLET ORAL
COMMUNITY
End: 2020-01-04

## 2019-09-12 RX ORDER — VENLAFAXINE HYDROCHLORIDE 75 MG/1
CAPSULE, EXTENDED RELEASE ORAL
COMMUNITY
End: 2019-09-13

## 2019-09-12 RX ORDER — FLUTICASONE PROPIONATE 50 MCG
SPRAY, SUSPENSION (ML) NASAL
COMMUNITY
End: 2020-09-10

## 2019-09-12 NOTE — PROGRESS NOTES
"    Subjective:     Encounter Date:09/12/2019      Patient ID: Mary Deleon is a 68 y.o. female.    Chief Complaint: CAD atrial fibrillation  History of Present Illness  68 -year-old white female patient with known history of obesity,  hypertension and dyslipidemia,  chronic atrial fibrillation,  underwent stress Myoview April 2016 showed no ischemia      echocardiogram April 2016 showed LV function is normal   borderline biatrial enlargement   mitral annular calcification with mild mitral insufficiency   no aortic stenosis   no pulmonary hypertension     Due to the recurrent symptoms patient underwent right and left cardiac catheterization June 2017  Patient found to have proximal LAD 70% mid LAD 70% obtuse marginal branch ostium 80% RCA ostium 80%  Patient underwent lima to LAD vein graft to the PDA and obtuse marginal branch Maze procedure     Patient was started on amiodarone and underwent cardioversion   patient went into sinus rhythm but significant sinus bradycardia so she stopped the amiodarone and digoxin  Due to recurrent AFib patient underwent ablation 2nd time in April 2019   transesophageal echocardiogram was done  April 2019   showed moderate LVH bilateral atrial enlargement normal EF   left atrial appendage is patent   recent EKG normal sinus rhythm patient is maintaining sinus rhythm   patient is doing much better   advise patient to control the sleep apnea aggressively                  /69 (BP Location: Left arm, Patient Position: Sitting, Cuff Size: Large Adult)   Pulse 73   Ht 160 cm (63\")   Wt 94.6 kg (208 lb 9.6 oz)   SpO2 97%   BMI 36.95 kg/m²     Past Medical History:   Diagnosis Date   • Arthritis    • Atrial fibrillation (CMS/HCC)    • COPD (chronic obstructive pulmonary disease) (CMS/HCC)    • Coronary artery disease    • Depression    • Diverticulitis    • Hyperlipidemia    • Hypertension    • Mitral valve prolapse      Past Surgical History:   Procedure Laterality Date "   • HYSTERECTOMY     • KNEE SURGERY       Social History     Socioeconomic History   • Marital status:      Spouse name: Not on file   • Number of children: Not on file   • Years of education: Not on file   • Highest education level: Not on file   Tobacco Use   • Smoking status: Passive Smoke Exposure - Never Smoker   Substance and Sexual Activity   • Alcohol use: No   • Drug use: No     Family History   Problem Relation Age of Onset   • Heart disease Mother    • Pneumonia Mother    • Asthma Father    • Alzheimer's disease Father    • Heart disease Father        Current Outpatient Medications:   •  albuterol (PROVENTIL HFA;VENTOLIN HFA) 108 (90 BASE) MCG/ACT inhaler, Inhale 2 puffs Every 4 (Four) Hours As Needed for Wheezing., Disp: , Rfl:   •  amiodarone (PACERONE) 200 MG tablet, amiodarone 200 mg tablet, Disp: , Rfl:   •  amLODIPine (NORVASC) 5 MG tablet, TAKE 1 TABLET BY MOUTH ONCE DAILY, Disp: 90 tablet, Rfl: 3  •  aspirin 81 MG EC tablet, Take 81 mg by mouth Daily., Disp: , Rfl:   •  atorvastatin (LIPITOR) 40 MG tablet, Take 40 mg by mouth every night at bedtime., Disp: , Rfl: 1  •  BREO ELLIPTA 100-25 MCG/INH inhaler, Inhale 1 puff Daily., Disp: , Rfl: 5  •  cetirizine (zyrTEC) 5 MG tablet, Take 5 mg by mouth Daily., Disp: , Rfl:   •  Docusate Sodium (COLACE PO), COLACE CAPS, Disp: , Rfl:   •  fluticasone (FLONASE) 50 MCG/ACT nasal spray, Flonase 50 mcg/actuation nasal spray,suspension  2 sprays each nostril once a day, Disp: , Rfl:   •  metFORMIN (GLUCOPHAGE) 500 MG tablet, Take 500 mg by mouth 2 (Two) Times a Day., Disp: , Rfl: 1  •  metoprolol tartrate (LOPRESSOR) 25 MG tablet, Take 25 mg by mouth 2 (Two) Times a Day., Disp: , Rfl: 5  •  Omega-3 Fatty Acids (FISH OIL) 1000 MG capsule capsule, Take  by mouth Daily With Breakfast., Disp: , Rfl:   •  ONETOUCH DELICA LANCETS FINE misc, USE TO CHECK GLUCOSE TWICE DAILY, Disp: , Rfl: 5  •  ONETOUCH VERIO test strip, USE TO CHECK GLUCOSE TWICE DAILY, Disp:  , Rfl: 3  •  SPIRIVA RESPIMAT 1.25 MCG/ACT aerosol solution inhaler, INHALE 2 SPRAY(S) BY MOUTH ONCE DAILY, Disp: , Rfl: 1  •  tiotropium (SPIRIVA HANDIHALER) 18 MCG per inhalation capsule, Spiriva Respimat 1.25 mcg/actuation solution for inhalation  INHALE 2 SPRAY(S) BY MOUTH ONCE DAILY, Disp: , Rfl:   •  traMADol (ULTRAM) 50 MG tablet, tramadol 50 mg tablet, Disp: , Rfl:   •  venlafaxine XR (EFFEXOR-XR) 75 MG 24 hr capsule, venlafaxine ER 75 mg capsule,extended release 24 hr, Disp: , Rfl:   •  warfarin (COUMADIN) 3 MG tablet, Take 3 mg by mouth Daily., Disp: , Rfl: 3  •  warfarin (COUMADIN) 4 MG tablet, , Disp: , Rfl: 1  •  amoxicillin (AMOXIL) 875 MG tablet, Every 12 (Twelve) Hours., Disp: , Rfl:   •  carvedilol (COREG) 25 MG tablet, Take 25 mg by mouth 2 (Two) Times a Day With Meals., Disp: , Rfl:   •  cyanocobalamin (CYANOCOBALAMIN) 100 MCG tablet tablet, B12, Disp: , Rfl:   •  digoxin (LANOXIN) 125 MCG tablet, Take 125 mcg by mouth Daily., Disp: , Rfl:   •  estradiol (VIVELLE-DOT) 0.025 MG/24HR patch, estradiol 0.025 mg/24 hr semiweekly transdermal patch  Apply 1 patch every week by transdermal route as directed., Disp: , Rfl:   •  HYDROcodone-homatropine (HYCODAN) 5-1.5 MG/5ML syrup, 5 mL Every 12 (Twelve) Hours., Disp: , Rfl:   •  lisinopril (PRINIVIL,ZESTRIL) 20 MG tablet, lisinopril 20 mg tablet  TAKE ONE TABLET BY MOUTH ONCE DAILY AS DIRECTED, Disp: , Rfl:   •  Omega-3 Fatty Acids (OMEGA-3 FISH OIL) 1000 MG capsule, Omega 3 Fish Oil  1000 mg 1 daily, Disp: , Rfl:   Allergies   Allergen Reactions   • Naproxen    • Sulfa Antibiotics    • Tylenol With Codeine #3 [Acetaminophen-Codeine] GI Intolerance       Review of Systems   Constitution: Negative for fever and malaise/fatigue.   HENT: Negative for congestion and hearing loss.    Eyes: Negative for double vision and visual disturbance.   Cardiovascular: Negative for chest pain, claudication, dyspnea on exertion, leg swelling and syncope.   Respiratory:  Negative for cough and shortness of breath.    Endocrine: Negative for cold intolerance.   Skin: Negative for color change and rash.   Musculoskeletal: Negative for arthritis and joint pain.   Gastrointestinal: Negative for abdominal pain and heartburn.   Genitourinary: Negative for hematuria.   Neurological: Negative for excessive daytime sleepiness and dizziness.   Psychiatric/Behavioral: Negative for depression. The patient is not nervous/anxious.    All other systems reviewed and are negative.             Objective:     Physical Exam   Constitutional: She is oriented to person, place, and time. She appears well-developed and well-nourished. She is cooperative.   HENT:   Head: Normocephalic and atraumatic.   Mouth/Throat: Uvula is midline and oropharynx is clear and moist. No oral lesions.   Eyes: Conjunctivae are normal. No scleral icterus.   Neck: Trachea normal. Neck supple. No JVD present. Carotid bruit is not present. No thyromegaly present.   Cardiovascular: Normal rate, regular rhythm, S1 normal, S2 normal, normal heart sounds, intact distal pulses and normal pulses. PMI is not displaced. Exam reveals no gallop and no friction rub.   No murmur heard.  Pulmonary/Chest: Effort normal and breath sounds normal.   Abdominal: Soft. Bowel sounds are normal.   Musculoskeletal: Normal range of motion.   Neurological: She is alert and oriented to person, place, and time. She has normal strength.   No focal deficits   Skin: Skin is warm. No cyanosis.   Psychiatric: She has a normal mood and affect.       Procedures    Lab Review:       Assessment:          Diagnosis Plan   1. Coronary artery disease involving native coronary artery of native heart without angina pectoris     2. Essential hypertension     3. Paroxysmal atrial fibrillation (CMS/HCC)     4. Mixed hyperlipidemia     5. Sleep apnea in adult            Plan:       CAD status post CABG stable  Hypertension well controlled  Dyslipidemia needs aggressive  control to modify cardiac risk factors  Paroxysmal it fibrillation status post cardioversion currently in sinus rhythm  INR subtherapeutic will adjust the Coumadin  Continue aggressive control of obstructive sleep apnea

## 2019-09-13 ENCOUNTER — OFFICE VISIT (OUTPATIENT)
Dept: CARDIOLOGY | Facility: CLINIC | Age: 69
End: 2019-09-13

## 2019-09-13 VITALS
BODY MASS INDEX: 36.31 KG/M2 | SYSTOLIC BLOOD PRESSURE: 117 MMHG | OXYGEN SATURATION: 94 % | RESPIRATION RATE: 18 BRPM | HEART RATE: 78 BPM | DIASTOLIC BLOOD PRESSURE: 74 MMHG | WEIGHT: 205 LBS

## 2019-09-13 DIAGNOSIS — G47.30 SLEEP APNEA IN ADULT: ICD-10-CM

## 2019-09-13 DIAGNOSIS — I48.0 PAROXYSMAL ATRIAL FIBRILLATION (HCC): ICD-10-CM

## 2019-09-13 DIAGNOSIS — R00.2 PALPITATIONS: ICD-10-CM

## 2019-09-13 DIAGNOSIS — I10 ESSENTIAL HYPERTENSION: Primary | ICD-10-CM

## 2019-09-13 PROCEDURE — 93000 ELECTROCARDIOGRAM COMPLETE: CPT | Performed by: INTERNAL MEDICINE

## 2019-09-13 PROCEDURE — 99213 OFFICE O/P EST LOW 20 MIN: CPT | Performed by: INTERNAL MEDICINE

## 2019-09-13 NOTE — PROGRESS NOTES
History of Present Illness:  CC-Atrial fibrillation       68-year-old female patient underwent prior cardioversion and has prior bradycardia post cardioversion and patient was on digoxin and amiodarone which have been stopped and left on low-dose of beta-blockers and started having recurrent arrhythmias and sent for follow-up.Patient has known coronary artery disease with prior bypass surgery done in 2017 with concomitant Maze procedure.   patient was on amiodarone and beta-blockers and digoxin and patient was left on low-dose of beta-blockers with recurrence of arrhythmias And patient was found to have severe biatrial enlargement and resistant right atrial flutter and underwent atrial flutter ablation which was a counter-clockwise right-sided flutter several weeks ago and  patient has developed redness in the left hand with flare-up of symptoms of for rheumatoid arthritis with recurrent inflammation Needing intravenous antibiotics  with resolution    patient had recurrent atrial fibrillation and underwent cryoablation few months ago and feels remarkably well since ablation and comes in for follow-up        assessment plan   recurrent atrial arrhythmias in the form of atrial flutter / atrial fibrillation with prior Maze procedure-- post right atrial flutter ablation with recurrence of atrial fibrillation-- post cryoablation with resolution of symptoms   sinus node dysfunction exacerbated by multiple medications    coronary artery disease with prior bypass surgery   hypertension   diabetes      follow-up 6 months   medications reviewed   kindly note patient's left atrial appendage is patent  Asymptomatic PVCs      Vital Signs: Blood pressure 117/74 pulse rate is 78 patient is afebrile respiration 12 times a minute    EKG shows sinus rhythm with a AK interval of 153 QTc interval of 452 , right axis deviation and intermittent narrow complex PVCs QTc interval of 450 ms and compared to prior EKG more frequent PVCs noted  on current recording and indication for EKG includes prior atrial arrhythmia ablation      Current Allergies (reviewed today):  SULFA (Critical)  NAPROSYN (Critical)  * TYLENOL (Critical)      Past Medical History:     Reviewed history from 10/18/2018 and no changes required:        Hypertension        Dyslipidemia        Coronary Artery Disease (05/25/2017)        C O P D        Chronic-Atrial Fibrillation        S/P CABG x3 Vessels        Atrial Flutter    Past Surgical History:     Reviewed history from 05/30/2019 and no changes required:        Cardiac Cath 2003  with narrowing distal LAD and prox Cx.         Hyster and BSO        Torn miniscus on left knee x2        Heart Catherization (05/24/2017)        C A B G: x3 Vessels, LIMA to LAD, SVG to PDA & SVG to OM3 (05/25/2017)        Cardioversion 9/24/18         Ablation 1/2/19 Dr. MAST         Social History:     Reviewed history from 05/30/2019 and no changes required:        Patient is a former smoker.        Passive Smoke: Y        Alcohol Use: N        Drug Use: N        HIV/High Risk: N        Regular Exercise: N        Hx Domestic Abuse: N        [employer] Affecting Care: N                      Review of Systems   General: No fatigue or tiredness, No change in weight   Eyes: No redness  Cardiovascular: No chest pain, no palpitations  Respiratory: No shortness of breath  Gastrointestinal: No nausea or vomiting, bleeding  Genitourinary: no hematuria or dysuria  Musculoskeletal: No arthralgia or myalgia  Skin: No rash  Neurologic: No numbness, tingling, syncope  Hematologic/Lymphatic: No abnormal bleeding      Physical Exam    General:      well developed, well nourished, in no acute distress.    Head:      normocephalic and atraumatic.    Eyes:      PERRL/EOM intact, conjunctiva and sclera clear with out nystagmus.    Neck:      no masses, thyromegaly, TRACHEA CENTRAL WITH NORMAL RESPIRATORY EFFORT  Lungs:      clear bilaterally to auscultation.    Heart:       non-displaced PMI, chest non-tender; regular rate and rhythm, S1, S2 without murmurs, rubs, or gallops  Skin:      intact without lesions or rashes.    Psych:      alert and cooperative; normal mood and affect; normal attention span and concentration.

## 2019-10-03 RX ORDER — ATORVASTATIN CALCIUM 40 MG/1
TABLET, FILM COATED ORAL
Qty: 90 TABLET | Refills: 1 | Status: SHIPPED | OUTPATIENT
Start: 2019-10-03 | End: 2020-02-27 | Stop reason: SDUPTHER

## 2019-12-05 RX ORDER — METOPROLOL SUCCINATE 25 MG/1
TABLET, EXTENDED RELEASE ORAL
Qty: 60 TABLET | Refills: 4 | Status: SHIPPED | OUTPATIENT
Start: 2019-12-05 | End: 2020-06-01 | Stop reason: SDUPTHER

## 2020-01-17 ENCOUNTER — TELEPHONE (OUTPATIENT)
Dept: CARDIOLOGY | Facility: CLINIC | Age: 70
End: 2020-01-17

## 2020-02-04 PROBLEM — H65.90 SEROUS OTITIS MEDIA: Status: ACTIVE | Noted: 2020-02-04

## 2020-02-04 PROBLEM — K82.9 DISORDER OF GALLBLADDER: Status: ACTIVE | Noted: 2020-02-04

## 2020-02-04 PROBLEM — R10.9 ABDOMINAL PAIN: Status: ACTIVE | Noted: 2020-02-04

## 2020-02-04 PROBLEM — E11.9 DIABETES MELLITUS (HCC): Status: ACTIVE | Noted: 2018-04-11

## 2020-02-04 PROBLEM — J44.9 CHRONIC OBSTRUCTIVE LUNG DISEASE: Status: ACTIVE | Noted: 2020-02-04

## 2020-02-04 PROBLEM — R53.83 FATIGUE: Status: ACTIVE | Noted: 2020-02-04

## 2020-02-04 PROBLEM — R05.9 COUGH: Status: ACTIVE | Noted: 2020-02-04

## 2020-02-04 PROBLEM — M25.539 PAIN IN WRIST: Status: ACTIVE | Noted: 2020-02-04

## 2020-02-04 PROBLEM — J32.0 MAXILLARY SINUSITIS: Status: ACTIVE | Noted: 2020-02-04

## 2020-02-04 PROBLEM — E78.5 HYPERLIPIDEMIA: Status: ACTIVE | Noted: 2020-02-04

## 2020-02-04 PROBLEM — M79.641 PAIN IN RIGHT HAND: Status: ACTIVE | Noted: 2020-02-04

## 2020-02-04 PROBLEM — I10 ESSENTIAL HYPERTENSION: Status: ACTIVE | Noted: 2020-02-04

## 2020-02-04 PROBLEM — J30.9 ALLERGIC RHINITIS: Status: ACTIVE | Noted: 2020-02-04

## 2020-02-04 PROBLEM — IMO0002 EXCESSIVE ANTICOAGULATION: Status: ACTIVE | Noted: 2018-07-26

## 2020-02-04 PROBLEM — J06.9 UPPER RESPIRATORY INFECTION: Status: ACTIVE | Noted: 2020-02-04

## 2020-02-04 PROBLEM — I48.91 ATRIAL FIBRILLATION (HCC): Status: ACTIVE | Noted: 2020-02-04

## 2020-02-04 PROBLEM — R07.9 CHEST PAIN: Status: ACTIVE | Noted: 2020-02-04

## 2020-02-04 PROBLEM — J44.1 ACUTE EXACERBATION OF CHRONIC OBSTRUCTIVE AIRWAYS DISEASE: Status: ACTIVE | Noted: 2020-02-04

## 2020-02-05 ENCOUNTER — OFFICE VISIT (OUTPATIENT)
Dept: RHEUMATOLOGY | Facility: CLINIC | Age: 70
End: 2020-02-05

## 2020-02-05 ENCOUNTER — LAB (OUTPATIENT)
Dept: LAB | Facility: HOSPITAL | Age: 70
End: 2020-02-05

## 2020-02-05 VITALS
DIASTOLIC BLOOD PRESSURE: 62 MMHG | BODY MASS INDEX: 35.08 KG/M2 | HEIGHT: 63 IN | HEART RATE: 77 BPM | SYSTOLIC BLOOD PRESSURE: 124 MMHG | WEIGHT: 198 LBS

## 2020-02-05 DIAGNOSIS — M06.4 INFLAMMATORY POLYARTHROPATHY (HCC): ICD-10-CM

## 2020-02-05 DIAGNOSIS — M15.4 EROSIVE OSTEOARTHRITIS: ICD-10-CM

## 2020-02-05 DIAGNOSIS — M15.4 EROSIVE OSTEOARTHRITIS: Primary | ICD-10-CM

## 2020-02-05 LAB
ALBUMIN SERPL-MCNC: 4.5 G/DL (ref 3.5–5.2)
ALBUMIN/GLOB SERPL: 1.5 G/DL
ALP SERPL-CCNC: 51 U/L (ref 39–117)
ALT SERPL W P-5'-P-CCNC: 13 U/L (ref 1–33)
ANION GAP SERPL CALCULATED.3IONS-SCNC: 14.1 MMOL/L (ref 5–15)
AST SERPL-CCNC: 13 U/L (ref 1–32)
BILIRUB SERPL-MCNC: 0.4 MG/DL (ref 0.2–1.2)
BUN BLD-MCNC: 22 MG/DL (ref 8–23)
BUN/CREAT SERPL: 28.9 (ref 7–25)
CALCIUM SPEC-SCNC: 9.8 MG/DL (ref 8.6–10.5)
CHLORIDE SERPL-SCNC: 99 MMOL/L (ref 98–107)
CHROMATIN AB SERPL-ACNC: <10 IU/ML (ref 0–14)
CO2 SERPL-SCNC: 25.9 MMOL/L (ref 22–29)
CREAT BLD-MCNC: 0.76 MG/DL (ref 0.57–1)
CRP SERPL-MCNC: 0.15 MG/DL (ref 0–0.5)
DEPRECATED RDW RBC AUTO: 44 FL (ref 37–54)
ERYTHROCYTE [DISTWIDTH] IN BLOOD BY AUTOMATED COUNT: 13.2 % (ref 12.3–15.4)
ERYTHROCYTE [SEDIMENTATION RATE] IN BLOOD: 22 MM/HR (ref 0–30)
GFR SERPL CREATININE-BSD FRML MDRD: 75 ML/MIN/1.73
GLOBULIN UR ELPH-MCNC: 3 GM/DL
GLUCOSE BLD-MCNC: 85 MG/DL (ref 65–99)
HCT VFR BLD AUTO: 39 % (ref 34–46.6)
HGB BLD-MCNC: 12.8 G/DL (ref 12–15.9)
MCH RBC QN AUTO: 29.9 PG (ref 26.6–33)
MCHC RBC AUTO-ENTMCNC: 32.8 G/DL (ref 31.5–35.7)
MCV RBC AUTO: 91.1 FL (ref 79–97)
PLATELET # BLD AUTO: 289 10*3/MM3 (ref 140–450)
PMV BLD AUTO: 10 FL (ref 6–12)
POTASSIUM BLD-SCNC: 4.4 MMOL/L (ref 3.5–5.2)
PROT SERPL-MCNC: 7.5 G/DL (ref 6–8.5)
RBC # BLD AUTO: 4.28 10*6/MM3 (ref 3.77–5.28)
SODIUM BLD-SCNC: 139 MMOL/L (ref 136–145)
WBC NRBC COR # BLD: 8.03 10*3/MM3 (ref 3.4–10.8)

## 2020-02-05 PROCEDURE — 99213 OFFICE O/P EST LOW 20 MIN: CPT | Performed by: NURSE PRACTITIONER

## 2020-02-05 PROCEDURE — 86431 RHEUMATOID FACTOR QUANT: CPT

## 2020-02-05 PROCEDURE — 85652 RBC SED RATE AUTOMATED: CPT

## 2020-02-05 PROCEDURE — 86140 C-REACTIVE PROTEIN: CPT

## 2020-02-05 PROCEDURE — 85027 COMPLETE CBC AUTOMATED: CPT

## 2020-02-05 PROCEDURE — 36415 COLL VENOUS BLD VENIPUNCTURE: CPT

## 2020-02-05 PROCEDURE — 80053 COMPREHEN METABOLIC PANEL: CPT

## 2020-02-05 PROCEDURE — 86200 CCP ANTIBODY: CPT

## 2020-02-05 NOTE — PROGRESS NOTES
Subjective   Mary Deleon is a 69 y.o. female.     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   Last seen in August 2019 by Dr. Pablo.  No recent labs for Rheumatology. Her current therapy includes topical compound cream and she applies this to her hands as needed. The topical cream works well and relieves her aches and pains. She is on coumadin therapy for her A-fib and is not a candidate for NSAIDs.     4/2019:X-rays of the hands were positive for signs of erosive osteoarthritis.       Today the patient is doing overall well, denies major joint pain, joint tenderness, joint swelling. Her AM stiffness lasts about 30-45 minutes and varies day to day. She injured her right 4th finger while scraping paint from a window. Cut her finger and has 2 stitches.  She is able to do her ADLs and is happy w/ her current therapy.     She denies fever or chills, no chest pain, intermittent shortness of breath as she has COPD and is under the care of pulmonology, no abdominal pain, nausea, vomiting, diarrhea constipation.  All other systems reviewed and they were negative.  She is a borderline diabetes and it watching her diet.      No changes in social or family history.         The following portions of the patient's history were reviewed and updated as appropriate: allergies, current medications, past family history, past medical history, past social history, past surgical history and problem list.    Review of Systems  See HPI    Objective   Physical Exam   Constitutional: She is oriented to person, place, and time. She appears well-developed and well-nourished.   HENT:   Head: Normocephalic.   Eyes: Pupils are equal, round, and reactive to light. EOM are normal.   Cardiovascular: Normal rate and regular rhythm.   Pulmonary/Chest: Effort normal and breath sounds normal.   Musculoskeletal:   She has good ROM, she has heberden's and  archie notes noted bilaterally.CMC squaring noted bilaterally. She has bandage over right 4th finger.   Neurological: She is alert and oriented to person, place, and time.   Skin: Skin is warm and dry.   Vitals reviewed.        Assessment/Plan   Mary was seen today for joint pain.    Diagnoses and all orders for this visit:    Erosive osteoarthritis  Comments:  Stable w/ current therapy.  Continue w/ topical compound cream BID as needed  Labs today  RTO 6 months or sooner if needed.  Orders:  -     Comprehensive Metabolic Panel; Future  -     C-reactive Protein; Future  -     Cyclic Citrul Peptide Antibody, IgG / IgA; Future  -     Rheumatoid Factor; Future  -     Sedimentation Rate; Future  -     CBC (No Diff); Future        Patient Instructions   Stable w/ current therapy.  Continue w/ topical compound cream BID as needed  Labs today  RTO 6 months or sooner if needed.

## 2020-02-06 LAB — CCP IGA+IGG SERPL IA-ACNC: 12 UNITS (ref 0–19)

## 2020-02-06 NOTE — PATIENT INSTRUCTIONS
Stable w/ current therapy.  Continue w/ topical compound cream BID as needed  Labs today  RTO 6 months or sooner if needed.

## 2020-02-27 RX ORDER — ATORVASTATIN CALCIUM 40 MG/1
40 TABLET, FILM COATED ORAL
Qty: 90 TABLET | Refills: 3 | Status: SHIPPED | OUTPATIENT
Start: 2020-02-27 | End: 2020-12-24

## 2020-03-12 ENCOUNTER — OFFICE VISIT (OUTPATIENT)
Dept: CARDIOLOGY | Facility: CLINIC | Age: 70
End: 2020-03-12

## 2020-03-12 VITALS
WEIGHT: 198 LBS | HEART RATE: 81 BPM | SYSTOLIC BLOOD PRESSURE: 127 MMHG | HEIGHT: 63 IN | BODY MASS INDEX: 35.08 KG/M2 | OXYGEN SATURATION: 92 % | DIASTOLIC BLOOD PRESSURE: 75 MMHG

## 2020-03-12 DIAGNOSIS — E78.2 MIXED HYPERLIPIDEMIA: ICD-10-CM

## 2020-03-12 DIAGNOSIS — I10 ESSENTIAL HYPERTENSION: ICD-10-CM

## 2020-03-12 DIAGNOSIS — I25.10 CORONARY ARTERY DISEASE INVOLVING NATIVE CORONARY ARTERY OF NATIVE HEART WITHOUT ANGINA PECTORIS: Primary | ICD-10-CM

## 2020-03-12 DIAGNOSIS — I48.0 PAROXYSMAL ATRIAL FIBRILLATION (HCC): ICD-10-CM

## 2020-03-12 DIAGNOSIS — I48.92 ATRIAL FLUTTER, UNSPECIFIED TYPE (HCC): ICD-10-CM

## 2020-03-12 PROCEDURE — 99213 OFFICE O/P EST LOW 20 MIN: CPT | Performed by: INTERNAL MEDICINE

## 2020-03-12 RX ORDER — WARFARIN SODIUM 4 MG/1
TABLET ORAL
OUTPATIENT
Start: 2020-03-12

## 2020-03-12 RX ORDER — WARFARIN SODIUM 3 MG/1
TABLET ORAL
OUTPATIENT
Start: 2020-03-12

## 2020-03-12 NOTE — PROGRESS NOTES
Subjective:     Encounter Date:03/12/2020      Patient ID: Mary Deleon is a 69 y.o. female.    Chief Complaint: Follow-up for CAD, A-Fib, & HTN  History of Present Illness     69-year-old white female patient with known history of obesity,  hypertension and dyslipidemia,  chronic atrial fibrillation,  underwent stress Myoview April 2016 showed no ischemia      echocardiogram April 2016 showed LV function is normal   borderline biatrial enlargement   mitral annular calcification with mild mitral insufficiency   no aortic stenosis   no pulmonary hypertension     Due to the recurrent symptoms patient underwent right and left cardiac catheterization June 2017  Patient found to have proximal LAD 70% mid LAD 70% obtuse marginal branch ostium 80% RCA ostium 80%  Patient underwent lima to LAD vein graft to the PDA and obtuse marginal branch Maze procedure     Patient was started on amiodarone and underwent cardioversion   patient went into sinus rhythm but significant sinus bradycardia so she stopped the amiodarone and digoxin  Due to recurrent AFib patient underwent ablation 2nd time in April 2019   transesophageal echocardiogram was done  April 2019   showed moderate LVH bilateral atrial enlargement normal EF   left atrial appendage is patent  EKG today sinus rhythm with frequent PVCs noted  Patient was advised to decrease the caffeine products   patient is doing much better   advise patient to control the sleep apnea aggressively    The following portions of the patient's history were reviewed and updated as appropriate: Allergies current medications past family history past medical history past social history past surgical history problem list and review of systems  Past Medical History:   Diagnosis Date   • Arthritis    • Atrial fibrillation (CMS/HCC)    • COPD (chronic obstructive pulmonary disease) (CMS/HCC)    • Coronary artery disease    • Depression    • Diverticulitis    • Hyperlipidemia    •  "Hypertension    • Mitral valve prolapse      Past Surgical History:   Procedure Laterality Date   • HYSTERECTOMY     • KNEE SURGERY       /75 (BP Location: Left arm, Patient Position: Sitting, Cuff Size: Adult)   Pulse 81   Ht 160 cm (63\")   Wt 89.8 kg (198 lb)   SpO2 92%   BMI 35.07 kg/m²   Family History   Problem Relation Age of Onset   • Heart disease Mother    • Pneumonia Mother    • Asthma Father    • Alzheimer's disease Father    • Heart disease Father        Current Outpatient Medications:   •  albuterol (PROVENTIL HFA;VENTOLIN HFA) 108 (90 BASE) MCG/ACT inhaler, Inhale 2 puffs Every 4 (Four) Hours As Needed for Wheezing., Disp: , Rfl:   •  amLODIPine (NORVASC) 5 MG tablet, TAKE 1 TABLET BY MOUTH ONCE DAILY, Disp: 90 tablet, Rfl: 3  •  aspirin 81 MG EC tablet, Take 81 mg by mouth Daily., Disp: , Rfl:   •  atorvastatin (LIPITOR) 40 MG tablet, Take 1 tablet by mouth every night at bedtime., Disp: 90 tablet, Rfl: 3  •  BREO ELLIPTA 100-25 MCG/INH inhaler, Inhale 1 puff Daily., Disp: , Rfl: 5  •  budesonide (RINOCORT AQUA) 32 MCG/ACT nasal spray, budesonide 32 mcg/actuation nasal spray  Take 2 sprays every day by nasal route at bedtime., Disp: , Rfl:   •  cetirizine (zyrTEC) 5 MG tablet, Take 5 mg by mouth Daily., Disp: , Rfl:   •  Docusate Sodium (COLACE PO), COLACE CAPS, Disp: , Rfl:   •  fluticasone (FLONASE) 50 MCG/ACT nasal spray, Flonase 50 mcg/actuation nasal spray,suspension  2 sprays each nostril once a day, Disp: , Rfl:   •  metFORMIN (GLUCOPHAGE) 500 MG tablet, Take 500 mg by mouth 2 (Two) Times a Day., Disp: , Rfl: 1  •  metoprolol succinate XL (TOPROL-XL) 25 MG 24 hr tablet, TAKE 1 TABLET BY MOUTH TWICE DAILY, Disp: 60 tablet, Rfl: 4  •  Omega-3 Fatty Acids (FISH OIL) 1000 MG capsule capsule, Take  by mouth Daily With Breakfast., Disp: , Rfl:   •  ONETOUCH DELICA LANCETS FINE misc, USE TO CHECK GLUCOSE TWICE DAILY, Disp: , Rfl: 5  •  ONETOUCH VERIO test strip, USE TO CHECK GLUCOSE TWICE " DAILY, Disp: , Rfl: 3  •  SPIRIVA RESPIMAT 1.25 MCG/ACT aerosol solution inhaler, INHALE 2 SPRAY(S) BY MOUTH ONCE DAILY, Disp: , Rfl: 1  •  warfarin (COUMADIN) 3 MG tablet, warfarin 3 mg tablet, Disp: , Rfl:   •  warfarin (COUMADIN) 4 MG tablet, warfarin 4 mg tablet  TAKE 1 TABLET BY MOUTH ONCE DAILY, Disp: , Rfl:   Social History     Socioeconomic History   • Marital status:      Spouse name: Not on file   • Number of children: Not on file   • Years of education: Not on file   • Highest education level: Not on file   Tobacco Use   • Smoking status: Passive Smoke Exposure - Never Smoker   • Smokeless tobacco: Never Used   Substance and Sexual Activity   • Alcohol use: No   • Drug use: No     Allergies   Allergen Reactions   • Codeine GI Intolerance   • Sulfa Antibiotics Hives   • Tylenol With Codeine #3 [Acetaminophen-Codeine] GI Intolerance   • Naproxen Rash   • Naproxen Sodium Rash     Review of Systems   Constitution: Negative for fever and malaise/fatigue.   HENT: Negative for congestion and hearing loss.    Eyes: Negative for double vision and visual disturbance.   Cardiovascular: Negative for chest pain, claudication, dyspnea on exertion, leg swelling and syncope.   Respiratory: Negative for cough and shortness of breath.    Endocrine: Negative for cold intolerance.   Skin: Negative for color change and rash.   Musculoskeletal: Negative for arthritis and joint pain.   Gastrointestinal: Negative for abdominal pain and heartburn.   Genitourinary: Negative for hematuria.   Neurological: Negative for excessive daytime sleepiness and dizziness.   Psychiatric/Behavioral: Negative for depression. The patient is not nervous/anxious.    All other systems reviewed and are negative.             Objective:     Physical Exam   Constitutional: She is oriented to person, place, and time. She appears well-developed and well-nourished. She is cooperative.   HENT:   Head: Normocephalic and atraumatic.   Mouth/Throat:  Uvula is midline and oropharynx is clear and moist. No oral lesions.   Eyes: Conjunctivae are normal. No scleral icterus.   Neck: Trachea normal. Neck supple. No JVD present. Carotid bruit is not present. No thyromegaly present.   Cardiovascular: Normal rate, regular rhythm, S1 normal, S2 normal, normal heart sounds, intact distal pulses and normal pulses. PMI is not displaced. Exam reveals no gallop and no friction rub.   No murmur heard.  Pulmonary/Chest: Effort normal and breath sounds normal.   Abdominal: Soft. Bowel sounds are normal.   Musculoskeletal: Normal range of motion.   Neurological: She is alert and oriented to person, place, and time. She has normal strength.   No focal deficits   Skin: Skin is warm. No cyanosis.   Psychiatric: She has a normal mood and affect.       Procedures    Lab Review:       Assessment:          Diagnosis Plan   1. Coronary artery disease involving native coronary artery of native heart without angina pectoris     2. Essential hypertension     3. Atrial flutter, unspecified type (CMS/HCC)     4. Mixed hyperlipidemia     5. Paroxysmal atrial fibrillation (CMS/HCC)            Plan:       CAD status post CABG stable  Paroxysmal atrial fibrillation status post ablation stable with some frequent PVCs  Patient was advised to cut down caffeine products control blood pressure aggressively  Aggressive control of dyslipidemia

## 2020-06-01 RX ORDER — METOPROLOL SUCCINATE 25 MG/1
25 TABLET, EXTENDED RELEASE ORAL 2 TIMES DAILY
Qty: 60 TABLET | Refills: 4 | Status: SHIPPED | OUTPATIENT
Start: 2020-06-01 | End: 2020-10-29

## 2020-07-20 ENCOUNTER — OFFICE VISIT (OUTPATIENT)
Dept: CARDIOLOGY | Facility: CLINIC | Age: 70
End: 2020-07-20

## 2020-07-20 VITALS
WEIGHT: 207 LBS | BODY MASS INDEX: 36.67 KG/M2 | HEART RATE: 73 BPM | SYSTOLIC BLOOD PRESSURE: 131 MMHG | DIASTOLIC BLOOD PRESSURE: 81 MMHG | OXYGEN SATURATION: 98 %

## 2020-07-20 DIAGNOSIS — G47.30 SLEEP APNEA IN ADULT: Primary | ICD-10-CM

## 2020-07-20 DIAGNOSIS — E78.2 MIXED HYPERLIPIDEMIA: ICD-10-CM

## 2020-07-20 DIAGNOSIS — I10 ESSENTIAL HYPERTENSION: ICD-10-CM

## 2020-07-20 DIAGNOSIS — I48.0 PAROXYSMAL ATRIAL FIBRILLATION (HCC): ICD-10-CM

## 2020-07-20 PROCEDURE — 99214 OFFICE O/P EST MOD 30 MIN: CPT | Performed by: INTERNAL MEDICINE

## 2020-07-20 PROCEDURE — 93000 ELECTROCARDIOGRAM COMPLETE: CPT | Performed by: INTERNAL MEDICINE

## 2020-07-20 RX ORDER — LEVOTHYROXINE SODIUM 0.03 MG/1
25 TABLET ORAL DAILY
Status: ON HOLD | COMMUNITY
End: 2021-01-11

## 2020-07-20 NOTE — PROGRESS NOTES
CC-Atrial fibrillation, sleep apnea       Sub--69-year-old female patient underwent prior cardioversion and has prior bradycardia post cardioversion and patient was on digoxin and amiodarone which have been stopped and left on low-dose of beta-blockers and started having recurrent arrhythmias .Patient has known coronary artery disease with prior bypass surgery done in 2017 with concomitant Maze procedure.   patient was on amiodarone and beta-blockers and digoxin and patient was left on low-dose of beta-blockers with recurrence of arrhythmias And patient was found to have severe biatrial enlargement and resistant right atrial flutter and underwent atrial flutter ablation which was a counter-clockwise right-sided flutter and after that patient had recurrent atrial fibrillation and underwent cryoablation  months ago and feels remarkably well since ablation and comes in for follow-up   She is using her CPAP machine  She has also has rheumatoid arthritis without any recent flareup  She feels remarkably well  She is very compliant with her medications       assessment plan   recurrent atrial arrhythmias in the form of atrial flutter / atrial fibrillation with prior Maze procedure-- post right atrial flutter ablation with recurrence of atrial fibrillation-- post cryoablation with resolution of symptoms  Treated sleep apnea   coronary artery disease with prior bypass surgery   hypertension   diabetes      follow-up 6 months   medications reviewed   kindly note patient's left atrial appendage is patent  Asymptomatic PVCs      Vital Signs: Blood pressure 131/84 pulse rate is 73 patient is afebrile respiration 12 times a minute    EKG shows sinus rhythm with a OK interval of 138, heart rate of 73 with intermittent narrow complex PVCs QT of 398 nonspecific ST changes and no significant changes compared to prior EKG EKG indication includes prior stroke atrial arrhythmia ablation       Past Medical History:     Reviewed history  from 10/18/2018 and no changes required:        Hypertension        Dyslipidemia        Coronary Artery Disease (05/25/2017)        C O P D        Chronic-Atrial Fibrillation        S/P CABG x3 Vessels        Atrial Flutter    Past Surgical History:     Reviewed history from 05/30/2019 and no changes required:        Cardiac Cath 2003  with narrowing distal LAD and prox Cx.         Hyster and BSO        Torn miniscus on left knee x2        Heart Catherization (05/24/2017)        C A B G: x3 Vessels, LIMA to LAD, SVG to PDA & SVG to OM3 (05/25/2017)        Cardioversion 9/24/18         Ablation 1/2/19 Dr. MAST                       Review of Systems   General: No fatigue or tiredness, No change in weight   Eyes: No redness  Cardiovascular: No chest pain, no palpitations  Respiratory: No shortness of breath  Gastrointestinal: No nausea or vomiting, bleeding  Genitourinary: no hematuria or dysuria  Musculoskeletal: No arthralgia or myalgia  Skin: No rash  Neurologic: No numbness, tingling, syncope  Hematologic/Lymphatic: No abnormal bleeding      Physical Exam    General:      well developed, well nourished, in no acute distress.    Head:      normocephalic and atraumatic.    Eyes:      PERRL/EOM intact, conjunctiva and sclera clear with out nystagmus.    Neck:      no masses, thyromegaly, TRACHEA CENTRAL WITH NORMAL RESPIRATORY EFFORT  Lungs:      clear bilaterally to auscultation.    Heart:      non-displaced PMI, chest non-tender; regular rate and rhythm, S1, S2 without murmurs, rubs, or gallops  Skin:      intact without lesions or rashes.    Psych:      alert and cooperative; normal mood and affect; normal attention span and concentration.    Alert and oriented x3 without any focal deficits  Normal gait    Electronically signed by Louie Rios MD, 07/20/20, 12:53 PM.

## 2020-07-20 NOTE — PROGRESS NOTES
History of Present Illness:  CC-Atrial fibrillation       68-year-old female patient underwent prior cardioversion and has prior bradycardia post cardioversion and patient was on digoxin and amiodarone which have been stopped and left on low-dose of beta-blockers and started having recurrent arrhythmias and sent for follow-up.Patient has known coronary artery disease with prior bypass surgery done in 2017 with concomitant Maze procedure.   patient was on amiodarone and beta-blockers and digoxin and patient was left on low-dose of beta-blockers with recurrence of arrhythmias And patient was found to have severe biatrial enlargement and resistant right atrial flutter and underwent atrial flutter ablation which was a counter-clockwise right-sided flutter several weeks ago and  patient has developed redness in the left hand with flare-up of symptoms of for rheumatoid arthritis with recurrent inflammation Needing intravenous antibiotics  with resolution    patient had recurrent atrial fibrillation and underwent cryoablation few months ago and feels remarkably well since ablation and comes in for follow-up        assessment plan   recurrent atrial arrhythmias in the form of atrial flutter / atrial fibrillation with prior Maze procedure-- post right atrial flutter ablation with recurrence of atrial fibrillation-- post cryoablation with resolution of symptoms   sinus node dysfunction exacerbated by multiple medications    coronary artery disease with prior bypass surgery   hypertension   diabetes      follow-up 6 months   medications reviewed   kindly note patient's left atrial appendage is patent  Asymptomatic PVCs      Vital Signs: Blood pressure 117/74 pulse rate is 78 patient is afebrile respiration 12 times a minute    EKG shows sinus rhythm with a SC interval of 153 QTc interval of 452 , right axis deviation and intermittent narrow complex PVCs QTc interval of 450 ms and compared to prior EKG more frequent PVCs noted  on current recording and indication for EKG includes prior atrial arrhythmia ablation      Current Allergies (reviewed today):  SULFA (Critical)  NAPROSYN (Critical)  * TYLENOL (Critical)      Past Medical History:     Reviewed history from 10/18/2018 and no changes required:        Hypertension        Dyslipidemia        Coronary Artery Disease (05/25/2017)        C O P D        Chronic-Atrial Fibrillation        S/P CABG x3 Vessels        Atrial Flutter    Past Surgical History:     Reviewed history from 05/30/2019 and no changes required:        Cardiac Cath 2003  with narrowing distal LAD and prox Cx.         Hyster and BSO        Torn miniscus on left knee x2        Heart Catherization (05/24/2017)        C A B G: x3 Vessels, LIMA to LAD, SVG to PDA & SVG to OM3 (05/25/2017)        Cardioversion 9/24/18         Ablation 1/2/19 Dr. MAST         Social History:     Reviewed history from 05/30/2019 and no changes required:        Patient is a former smoker.        Passive Smoke: Y        Alcohol Use: N        Drug Use: N        HIV/High Risk: N        Regular Exercise: N        Hx Domestic Abuse: N        [employer] Affecting Care: N                      Review of Systems   General: No fatigue or tiredness, No change in weight   Eyes: No redness  Cardiovascular: No chest pain, no palpitations  Respiratory: No shortness of breath  Gastrointestinal: No nausea or vomiting, bleeding  Genitourinary: no hematuria or dysuria  Musculoskeletal: No arthralgia or myalgia  Skin: No rash  Neurologic: No numbness, tingling, syncope  Hematologic/Lymphatic: No abnormal bleeding      Physical Exam    General:      well developed, well nourished, in no acute distress.    Head:      normocephalic and atraumatic.    Eyes:      PERRL/EOM intact, conjunctiva and sclera clear with out nystagmus.    Neck:      no masses, thyromegaly, TRACHEA CENTRAL WITH NORMAL RESPIRATORY EFFORT  Lungs:      clear bilaterally to auscultation.    Heart:       non-displaced PMI, chest non-tender; regular rate and rhythm, S1, S2 without murmurs, rubs, or gallops  Skin:      intact without lesions or rashes.    Psych:      alert and cooperative; normal mood and affect; normal attention span and concentration.

## 2020-08-04 RX ORDER — AMLODIPINE BESYLATE 5 MG/1
TABLET ORAL
Qty: 90 TABLET | Refills: 1 | Status: ON HOLD | OUTPATIENT
Start: 2020-08-04 | End: 2021-01-11

## 2020-09-10 ENCOUNTER — OFFICE VISIT (OUTPATIENT)
Dept: CARDIOLOGY | Facility: CLINIC | Age: 70
End: 2020-09-10

## 2020-09-10 VITALS
HEIGHT: 63 IN | DIASTOLIC BLOOD PRESSURE: 64 MMHG | SYSTOLIC BLOOD PRESSURE: 136 MMHG | OXYGEN SATURATION: 95 % | WEIGHT: 210 LBS | BODY MASS INDEX: 37.21 KG/M2 | HEART RATE: 57 BPM

## 2020-09-10 DIAGNOSIS — I48.21 PERMANENT ATRIAL FIBRILLATION (HCC): ICD-10-CM

## 2020-09-10 DIAGNOSIS — E78.2 MIXED HYPERLIPIDEMIA: ICD-10-CM

## 2020-09-10 DIAGNOSIS — I25.10 CORONARY ARTERY DISEASE INVOLVING NATIVE CORONARY ARTERY OF NATIVE HEART WITHOUT ANGINA PECTORIS: Primary | ICD-10-CM

## 2020-09-10 DIAGNOSIS — Z95.1 PRESENCE OF AORTOCORONARY BYPASS GRAFT: ICD-10-CM

## 2020-09-10 DIAGNOSIS — I10 ESSENTIAL HYPERTENSION: ICD-10-CM

## 2020-09-10 PROCEDURE — 99214 OFFICE O/P EST MOD 30 MIN: CPT | Performed by: INTERNAL MEDICINE

## 2020-09-10 RX ORDER — MONTELUKAST SODIUM 10 MG/1
10 TABLET ORAL DAILY
COMMUNITY
Start: 2020-09-01

## 2020-09-10 NOTE — PROGRESS NOTES
Subjective:     Encounter Date:09/10/2020      Patient ID: Mary Deleon is a 69 y.o. female.    Chief Complaint: Coronary Artery Disease & Atrial Fibrillation  History of Present Illness           69-year-old white female patient with known history of obesity,  hypertension and dyslipidemia,  chronic atrial fibrillation,  underwent stress Myoview April 2016 showed no ischemia      echocardiogram April 2016 showed LV function is normal   borderline biatrial enlargement   mitral annular calcification with mild mitral insufficiency   no aortic stenosis   no pulmonary hypertension     Due to the recurrent symptoms patient underwent right and left cardiac catheterization June 2017  Patient found to have proximal LAD 70% mid LAD 70% obtuse marginal branch ostium 80% RCA ostium 80%  Patient underwent lima to LAD vein graft to the PDA and obtuse marginal branch Maze procedure     Patient was started on amiodarone and underwent cardioversion   patient went into sinus rhythm but significant sinus bradycardia so she stopped the amiodarone and digoxin  Due to recurrent AFib patient underwent ablation 2nd time in April 2019   transesophageal echocardiogram was done  April 2019   showed moderate LVH bilateral atrial enlargement normal EF   left atrial appendage is patent    Patient is doing well without any active symptoms  We will follow-up in the next 6 months with labs EKG     Labs looks okay    The following portions of the patient's history were reviewed and updated as appropriate: Allergies current medications past family history past medical history past social history past surgical history problem list and review of systems  Past Medical History:   Diagnosis Date   • Arthritis    • Atrial fibrillation (CMS/HCC)    • COPD (chronic obstructive pulmonary disease) (CMS/HCC)    • Coronary artery disease    • Depression    • Diverticulitis    • Hyperlipidemia    • Hypertension    • Mitral valve prolapse      Past  "Surgical History:   Procedure Laterality Date   • HYSTERECTOMY     • KNEE SURGERY       /64 (BP Location: Left arm, Patient Position: Sitting, Cuff Size: Large Adult)   Pulse 57   Ht 160 cm (63\")   Wt 95.3 kg (210 lb)   SpO2 95%   Breastfeeding No   BMI 37.20 kg/m²   Family History   Problem Relation Age of Onset   • Heart disease Mother    • Pneumonia Mother    • Asthma Father    • Alzheimer's disease Father    • Heart disease Father        Current Outpatient Medications:   •  amLODIPine (NORVASC) 5 MG tablet, Take 1 tablet by mouth once daily, Disp: 90 tablet, Rfl: 1  •  aspirin 81 MG EC tablet, Take 81 mg by mouth Daily., Disp: , Rfl:   •  atorvastatin (LIPITOR) 40 MG tablet, Take 1 tablet by mouth every night at bedtime., Disp: 90 tablet, Rfl: 3  •  BREO ELLIPTA 100-25 MCG/INH inhaler, Inhale 1 puff Daily., Disp: , Rfl: 5  •  cetirizine (zyrTEC) 5 MG tablet, Take 5 mg by mouth Daily., Disp: , Rfl:   •  Docusate Sodium (COLACE PO), COLACE CAPS, Disp: , Rfl:   •  levothyroxine (SYNTHROID, LEVOTHROID) 25 MCG tablet, Take 25 mcg by mouth Daily., Disp: , Rfl:   •  metFORMIN (GLUCOPHAGE) 500 MG tablet, Take 500 mg by mouth 2 (Two) Times a Day., Disp: , Rfl: 1  •  metoprolol succinate XL (TOPROL-XL) 25 MG 24 hr tablet, Take 1 tablet by mouth 2 (Two) Times a Day., Disp: 60 tablet, Rfl: 4  •  montelukast (SINGULAIR) 10 MG tablet, Take 10 mg by mouth Daily., Disp: , Rfl:   •  Omega-3 Fatty Acids (FISH OIL) 1000 MG capsule capsule, Take  by mouth Daily With Breakfast., Disp: , Rfl:   •  ONETOUCH DELICA LANCETS FINE misc, USE TO CHECK GLUCOSE TWICE DAILY, Disp: , Rfl: 5  •  ONETOUCH VERIO test strip, USE TO CHECK GLUCOSE TWICE DAILY, Disp: , Rfl: 3  •  SPIRIVA RESPIMAT 1.25 MCG/ACT aerosol solution inhaler, INHALE 2 SPRAY(S) BY MOUTH ONCE DAILY, Disp: , Rfl: 1  •  warfarin (COUMADIN) 3 MG tablet, warfarin 3 mg tablet, Disp: , Rfl:   •  warfarin (COUMADIN) 4 MG tablet, warfarin 4 mg tablet  TAKE 1 TABLET BY MOUTH " ONCE DAILY, Disp: , Rfl:   Social History     Socioeconomic History   • Marital status:      Spouse name: Not on file   • Number of children: Not on file   • Years of education: Not on file   • Highest education level: Not on file   Tobacco Use   • Smoking status: Passive Smoke Exposure - Never Smoker   • Smokeless tobacco: Never Used   Substance and Sexual Activity   • Alcohol use: No   • Drug use: No   • Sexual activity: Defer     Allergies   Allergen Reactions   • Codeine GI Intolerance   • Sulfa Antibiotics Hives   • Tylenol With Codeine #3 [Acetaminophen-Codeine] GI Intolerance   • Naproxen Rash   • Naproxen Sodium Rash     Review of Systems   Constitution: Negative for fever and malaise/fatigue.   HENT: Negative for congestion and hearing loss.    Eyes: Negative for double vision and visual disturbance.   Cardiovascular: Negative for chest pain, claudication, dyspnea on exertion, leg swelling and syncope.   Respiratory: Negative for cough and shortness of breath.    Endocrine: Negative for cold intolerance.   Skin: Negative for color change and rash.   Musculoskeletal: Negative for arthritis and joint pain.   Gastrointestinal: Negative for abdominal pain and heartburn.   Genitourinary: Negative for hematuria.   Neurological: Negative for excessive daytime sleepiness and dizziness.   Psychiatric/Behavioral: Negative for depression. The patient is not nervous/anxious.    All other systems reviewed and are negative.             Objective:     Physical Exam   Constitutional: She is oriented to person, place, and time. She appears well-developed and well-nourished. She is cooperative.   HENT:   Head: Normocephalic and atraumatic.   Mouth/Throat: Uvula is midline and oropharynx is clear and moist. No oral lesions.   Eyes: Conjunctivae are normal. No scleral icterus.   Neck: Trachea normal. Neck supple. No JVD present. Carotid bruit is not present. No thyromegaly present.   Cardiovascular: Normal rate,  regular rhythm, S1 normal, S2 normal, normal heart sounds, intact distal pulses and normal pulses. PMI is not displaced. Exam reveals no gallop and no friction rub.   No murmur heard.  Pulmonary/Chest: Effort normal and breath sounds normal.   Abdominal: Soft. Bowel sounds are normal.   Musculoskeletal: Normal range of motion.   Neurological: She is alert and oriented to person, place, and time. She has normal strength.   No focal deficits   Skin: Skin is warm. No cyanosis.   Psychiatric: She has a normal mood and affect.       Procedures    Lab Review:       Assessment:          Diagnosis Plan   1. Coronary artery disease involving native coronary artery of native heart without angina pectoris     2. Essential hypertension     3. Permanent atrial fibrillation (CMS/HCC)     4. Presence of aortocoronary bypass graft     5. Mixed hyperlipidemia            Plan:       CAD Status post CABG stable  Atrial fibrillation status post ablation stable  Continue anticoagulation  Needs aggressive control of hypertension dyslipidemia modify cardiac risk factors

## 2020-10-09 ENCOUNTER — TELEPHONE (OUTPATIENT)
Dept: CARDIOLOGY | Facility: CLINIC | Age: 70
End: 2020-10-09

## 2020-10-09 NOTE — TELEPHONE ENCOUNTER
Called patient Dr. Issa has been prescribing warfarin and checking blood. Patient now states she thought we were managing drug. I advised patient the prescriber needs to manage drug and our office has not filled warfarin in greater then one year.     I called Dr. Issa's office [phone] they are closed for the day apLPN

## 2020-10-09 NOTE — TELEPHONE ENCOUNTER
From the cardiac standpoint she needs to get an EKG  If QT interval is within normal limit no contraindication to take Plaquenil  But if there are drug interactions with other medications she needs to check with PCP regarding that

## 2020-10-09 NOTE — TELEPHONE ENCOUNTER
Patient is now seeing Dr. Freedman Rheumatologist. She was prescribed Hydrocholoquine but she has not taken it yet due to the drug interactions and side effects.     Patient PCP advised her to check with you if it is okay to start.     Patient is also concerned with metformin and Hydrocholoquine. I advised patient metformin instruction needs to come from PCP as they manager her Diabetes. Pt verbalizes understanding.     Please advise

## 2020-10-13 NOTE — TELEPHONE ENCOUNTER
[place] [facility] [place] she needs to schedule a EKG in the office when Dr. Jean Baptiste is in seeing patients so he can review it.

## 2020-10-14 NOTE — TELEPHONE ENCOUNTER
Spoke with maurice Issa's MA they are managing INR. Pt was actually in today for INR recheck apLPN

## 2020-10-15 ENCOUNTER — OFFICE VISIT (OUTPATIENT)
Dept: CARDIOLOGY | Facility: CLINIC | Age: 70
End: 2020-10-15

## 2020-10-15 VITALS
HEIGHT: 63 IN | SYSTOLIC BLOOD PRESSURE: 150 MMHG | HEART RATE: 57 BPM | WEIGHT: 209 LBS | OXYGEN SATURATION: 98 % | BODY MASS INDEX: 37.03 KG/M2 | DIASTOLIC BLOOD PRESSURE: 67 MMHG

## 2020-10-15 DIAGNOSIS — I25.10 CORONARY ARTERY DISEASE INVOLVING NATIVE CORONARY ARTERY OF NATIVE HEART WITHOUT ANGINA PECTORIS: Primary | ICD-10-CM

## 2020-10-15 DIAGNOSIS — I10 ESSENTIAL HYPERTENSION: ICD-10-CM

## 2020-10-15 DIAGNOSIS — E78.2 MIXED HYPERLIPIDEMIA: ICD-10-CM

## 2020-10-15 DIAGNOSIS — Z95.1 PRESENCE OF AORTOCORONARY BYPASS GRAFT: ICD-10-CM

## 2020-10-15 DIAGNOSIS — R00.1 BRADYCARDIA, SINUS: ICD-10-CM

## 2020-10-15 DIAGNOSIS — I48.0 PAROXYSMAL ATRIAL FIBRILLATION (HCC): ICD-10-CM

## 2020-10-15 PROCEDURE — 99214 OFFICE O/P EST MOD 30 MIN: CPT | Performed by: INTERNAL MEDICINE

## 2020-10-15 RX ORDER — CEFDINIR 300 MG/1
300 CAPSULE ORAL 2 TIMES DAILY
COMMUNITY
Start: 2020-10-07 | End: 2020-10-15

## 2020-10-15 RX ORDER — HYDROXYCHLOROQUINE SULFATE 200 MG/1
200 TABLET, FILM COATED ORAL 2 TIMES DAILY
Status: ON HOLD | COMMUNITY
Start: 2020-09-25 | End: 2021-01-11

## 2020-10-15 NOTE — PROGRESS NOTES
Subjective:     Encounter Date:10/15/2020      Patient ID: Mary Deleon is a 69 y.o. female.    Chief Complaint: Coronary Artery Disease & Atrial Fibrilliation  History of Present Illness     69-year-old white female patient with known history of obesity,  hypertension and dyslipidemia,  chronic atrial fibrillation,  underwent stress Myoview April 2016 showed no ischemia      echocardiogram April 2016 showed LV function is normal   borderline biatrial enlargement   mitral annular calcification with mild mitral insufficiency   no aortic stenosis   no pulmonary hypertension     Due to the recurrent symptoms patient underwent right and left cardiac catheterization June 2017  Patient found to have proximal LAD 70% mid LAD 70% obtuse marginal branch ostium 80% RCA ostium 80%  Patient underwent lima to LAD vein graft to the PDA and obtuse marginal branch Maze procedure     Patient was started on amiodarone and underwent cardioversion   patient went into sinus rhythm but significant sinus bradycardia so she stopped the amiodarone and digoxin  Due to recurrent AFib patient underwent ablation 2nd time in April 2019   transesophageal echocardiogram was done  April 2019   showed moderate LVH bilateral atrial enlargement normal EF   left atrial appendage is patent   Patient recently admitted to hospital for cellulitis given steroids antibiotics at that time she had some palpitations  But now patient is doing well EKG done yesterday showed sinus rhythm sinus bradycardia  She would like to start Plaquenil in the future advised to have follow-up EKGTo monitor QT interval  Follow-up as scheduled with EKG  The following portions of the patient's history were reviewed and updated as appropriate: Allergies current medications past family history past medical history past social history past surgical history problem list and review of systems  Past Medical History:   Diagnosis Date   • Arthritis    • Atrial fibrillation  "(CMS/[facility])    • COPD (chronic obstructive pulmonary disease) (CMS/[facility])    • Coronary artery disease    • Depression    • Diverticulitis    • Hyperlipidemia    • Hypertension    • Mitral valve prolapse      Past Surgical History:   Procedure Laterality Date   • HYSTERECTOMY     • KNEE SURGERY       /67 (BP Location: Left arm, Patient Position: Sitting, Cuff Size: Adult)   Pulse 57   Ht 160 cm (63\")   Wt 94.8 kg (209 lb)   SpO2 98%   Breastfeeding No   BMI 37.02 kg/m²   Family History   Problem Relation Age of Onset   • Heart disease Mother    • Pneumonia Mother    • Asthma Father    • Alzheimer's disease Father    • Heart disease Father        Current Outpatient Medications:   •  amLODIPine (NORVASC) 5 MG tablet, Take 1 tablet by mouth once daily, Disp: 90 tablet, Rfl: 1  •  aspirin 81 MG EC tablet, Take 81 mg by mouth Daily., Disp: , Rfl:   •  atorvastatin (LIPITOR) 40 MG tablet, Take 1 tablet by mouth every night at bedtime., Disp: 90 tablet, Rfl: 3  •  BREO ELLIPTA 100-25 MCG/INH inhaler, Inhale 1 puff Daily., Disp: , Rfl: 5  •  cetirizine (zyrTEC) 5 MG tablet, Take 5 mg by mouth Daily., Disp: , Rfl:   •  Docusate Sodium (COLACE PO), COLACE CAPS, Disp: , Rfl:   •  levothyroxine (SYNTHROID, LEVOTHROID) 25 MCG tablet, Take 25 mcg by mouth Daily., Disp: , Rfl:   •  metFORMIN (GLUCOPHAGE) 500 MG tablet, Take 500 mg by mouth 2 (Two) Times a Day., Disp: , Rfl: 1  •  metoprolol succinate XL (TOPROL-XL) 25 MG 24 hr tablet, Take 1 tablet by mouth 2 (Two) Times a Day., Disp: 60 tablet, Rfl: 4  •  montelukast (SINGULAIR) 10 MG tablet, Take 10 mg by mouth Daily., Disp: , Rfl:   •  Omega-3 Fatty Acids (FISH OIL) 1000 MG capsule capsule, Take  by mouth Daily With Breakfast., Disp: , Rfl:   •  ONETOUCH DELICA LANCETS FINE misc, USE TO CHECK GLUCOSE TWICE DAILY, Disp: , Rfl: 5  •  ONETOUCH VERIO test strip, USE TO CHECK GLUCOSE TWICE DAILY, Disp: , Rfl: 3  •  SPIRIVA RESPIMAT 1.25 MCG/ACT aerosol solution inhaler, " "INHALE 2 SPRAY(S) BY MOUTH ONCE DAILY, Disp: , Rfl: 1  •  warfarin (COUMADIN) 3 MG tablet, warfarin 3 mg tablet, Disp: , Rfl:   •  warfarin (COUMADIN) 4 MG tablet, warfarin 4 mg tablet  TAKE 1 TABLET BY MOUTH ONCE DAILY, Disp: , Rfl:   •  hydroxychloroquine (PLAQUENIL) 200 MG tablet, Take 200 mg by mouth 2 (Two) Times a Day. as directed, Disp: , Rfl:   Social History     Socioeconomic History   • Marital status:      Spouse name: Not on file   • Number of children: Not on file   • Years of education: Not on file   • Highest education level: Not on file   Tobacco Use   • Smoking status: Passive Smoke Exposure - Never Smoker   • Smokeless tobacco: Never Used   Substance and Sexual Activity   • Alcohol use: No   • Drug use: No   • Sexual activity: Defer     Allergies   Allergen Reactions   • Codeine GI Intolerance   • Sulfa Antibiotics Hives   • Tylenol With Codeine #3 [Acetaminophen-Codeine] GI Intolerance   • Vancomycin Other (See Comments)     \"red man syndrome\" - turned red from head to toe   • Naproxen Rash   • Naproxen Sodium Rash     Review of Systems   Constitution: Negative for fever and malaise/fatigue.   HENT: Negative for congestion and hearing loss.    Eyes: Negative for double vision and visual disturbance.   Cardiovascular: Negative for chest pain, claudication, dyspnea on exertion, leg swelling, palpitations and syncope.   Respiratory: Negative for cough and shortness of breath.    Endocrine: Negative for cold intolerance.   Skin: Negative for color change and rash.   Musculoskeletal: Negative for arthritis and joint pain.   Gastrointestinal: Negative for abdominal pain, heartburn, nausea and vomiting.   Genitourinary: Negative for hematuria.   Neurological: Negative for excessive daytime sleepiness, dizziness, focal weakness and headaches.   Psychiatric/Behavioral: Negative for depression. The patient is not nervous/anxious.    All other systems reviewed and are negative.             Objective: "     Constitutional:       Appearance: Well-developed.   Eyes:      General: No scleral icterus.     Conjunctiva/sclera: Conjunctivae normal.   HENT:      Head: Normocephalic and atraumatic.    Mouth/Throat:      Mouth: No oral lesions.      Pharynx: Uvula midline.   Neck:      Musculoskeletal: Neck supple.      Thyroid: No thyromegaly.      Vascular: No carotid bruit or JVD.      Trachea: Trachea normal.   Pulmonary:      Effort: Pulmonary effort is normal.      Breath sounds: Normal breath sounds.   Cardiovascular:      Normal rate. Regular rhythm.      No gallop.   Pulses:     Intact distal pulses.   Abdominal:      General: Bowel sounds are normal.      Palpations: Abdomen is soft.   Musculoskeletal: Normal range of motion.   Skin:     General: Skin is warm. There is no cyanosis.   Neurological:      Mental Status: Alert and oriented to person, place, and time.      Comments: No focal deficits   Psychiatric:         Behavior: Behavior is cooperative.         Procedures    Lab Review:       Assessment:          Diagnosis Plan   1. Coronary artery disease involving native coronary artery of native heart without angina pectoris     2. Bradycardia, sinus     3. Essential hypertension     4. Presence of aortocoronary bypass graft     5. Mixed hyperlipidemia     6. Paroxysmal atrial fibrillation (CMS/HCC)            Plan:         ParoxysmalAtrial fibrillation status post ablation currently stable  Recent problems with cellulitis on steroids and antibiotics with palpitations but now stable  Continue aggressive control of hypertension dyslipidemia modify cardiac risk factors follow-up as scheduled

## 2020-10-29 RX ORDER — METOPROLOL SUCCINATE 25 MG/1
TABLET, EXTENDED RELEASE ORAL
Qty: 180 TABLET | Refills: 3 | Status: ON HOLD | OUTPATIENT
Start: 2020-10-29 | End: 2021-01-11

## 2020-11-19 ENCOUNTER — OFFICE VISIT (OUTPATIENT)
Dept: CARDIOLOGY | Facility: CLINIC | Age: 70
End: 2020-11-19

## 2020-11-19 VITALS
DIASTOLIC BLOOD PRESSURE: 64 MMHG | HEART RATE: 59 BPM | BODY MASS INDEX: 36.96 KG/M2 | SYSTOLIC BLOOD PRESSURE: 139 MMHG | WEIGHT: 208.6 LBS | OXYGEN SATURATION: 96 % | HEIGHT: 63 IN

## 2020-11-19 DIAGNOSIS — I10 ESSENTIAL HYPERTENSION: ICD-10-CM

## 2020-11-19 DIAGNOSIS — E78.2 MIXED HYPERLIPIDEMIA: ICD-10-CM

## 2020-11-19 DIAGNOSIS — I48.0 PAROXYSMAL ATRIAL FIBRILLATION (HCC): ICD-10-CM

## 2020-11-19 DIAGNOSIS — E11.9 TYPE 2 DIABETES MELLITUS WITHOUT COMPLICATION, WITHOUT LONG-TERM CURRENT USE OF INSULIN (HCC): ICD-10-CM

## 2020-11-19 DIAGNOSIS — Z95.1 PRESENCE OF AORTOCORONARY BYPASS GRAFT: ICD-10-CM

## 2020-11-19 DIAGNOSIS — I25.110 CORONARY ARTERY DISEASE INVOLVING NATIVE CORONARY ARTERY OF NATIVE HEART WITH UNSTABLE ANGINA PECTORIS (HCC): Primary | ICD-10-CM

## 2020-11-19 PROCEDURE — 99214 OFFICE O/P EST MOD 30 MIN: CPT | Performed by: INTERNAL MEDICINE

## 2020-11-19 NOTE — PROGRESS NOTES
Subjective:     Encounter Date:11/19/2020      Patient ID: Mary Deleon is a 69 y.o. female.    Chief Complaint:Chest pressure  History of Present Illness   69-year-old white female patient with known history of obesity,  hypertension and dyslipidemia,  chronic atrial fibrillation,  underwent stress Myoview April 2016 showed no ischemia      echocardiogram April 2016 showed LV function is normal   borderline biatrial enlargement   mitral annular calcification with mild mitral insufficiency   no aortic stenosis   no pulmonary hypertension     Due to the recurrent symptoms patient underwent right and left cardiac catheterization June 2017  Patient found to have proximal LAD 70% mid LAD 70% obtuse marginal branch ostium 80% RCA ostium 80%  Patient underwent lima to LAD vein graft to the PDA and obtuse marginal branch Maze procedure     Patient was started on amiodarone and underwent cardioversion   patient went into sinus rhythm but significant sinus bradycardia so she stopped the amiodarone and digoxin  Due to recurrent AFib patient underwent ablation 2nd time in April 2019   transesophageal echocardiogram was done  April 2019   showed moderate LVH bilateral atrial enlargement normal EF   left atrial appendage is patent  Patient decided not to take hydroxychloroquine    Patient comes back today she is having some chest pressure on and off mild shortness of breath in the last 2 weeks  It can happen at rest or with exertion  No radiation of the discomfort  EKG sinus rhythm no significant ST-T abnormalities  Patient was advised pharmacological stress Myoview and echocardiogram in the near future    The following portions of the patient's history were reviewed and updated as appropriate: Allergies current medications past family history past medical history past social history past surgical history problem list and review of systems  Past Medical History:   Diagnosis Date   • Arthritis    • Atrial fibrillation  "(CMS/[facility])    • COPD (chronic obstructive pulmonary disease) (CMS/[facility])    • Coronary artery disease    • Depression    • Diverticulitis    • Hyperlipidemia    • Hypertension    • Mitral valve prolapse      Past Surgical History:   Procedure Laterality Date   • HYSTERECTOMY     • KNEE SURGERY       /64 (BP Location: Left arm, Patient Position: Sitting, Cuff Size: Large Adult)   Pulse 59   Ht 160 cm (63\")   Wt 94.6 kg (208 lb 9.6 oz)   SpO2 96%   BMI 36.95 kg/m²   Family History   Problem Relation Age of Onset   • Heart disease Mother    • Pneumonia Mother    • Asthma Father    • Alzheimer's disease Father    • Heart disease Father        Current Outpatient Medications:   •  amLODIPine (NORVASC) 5 MG tablet, Take 1 tablet by mouth once daily, Disp: 90 tablet, Rfl: 1  •  aspirin 81 MG EC tablet, Take 81 mg by mouth Daily., Disp: , Rfl:   •  atorvastatin (LIPITOR) 40 MG tablet, Take 1 tablet by mouth every night at bedtime., Disp: 90 tablet, Rfl: 3  •  BREO ELLIPTA 100-25 MCG/INH inhaler, Inhale 1 puff Daily., Disp: , Rfl: 5  •  cetirizine (zyrTEC) 5 MG tablet, Take 5 mg by mouth Daily., Disp: , Rfl:   •  Docusate Sodium (COLACE PO), COLACE CAPS, Disp: , Rfl:   •  levothyroxine (SYNTHROID, LEVOTHROID) 25 MCG tablet, Take 25 mcg by mouth Daily., Disp: , Rfl:   •  metFORMIN (GLUCOPHAGE) 500 MG tablet, Take 500 mg by mouth 2 (Two) Times a Day., Disp: , Rfl: 1  •  metoprolol succinate XL (TOPROL-XL) 25 MG 24 hr tablet, Take 1 tablet by mouth twice daily, Disp: 180 tablet, Rfl: 3  •  montelukast (SINGULAIR) 10 MG tablet, Take 10 mg by mouth Daily., Disp: , Rfl:   •  Omega-3 Fatty Acids (FISH OIL) 1000 MG capsule capsule, Take  by mouth Daily With Breakfast., Disp: , Rfl:   •  ONETOUCH DELICA LANCETS FINE misc, USE TO CHECK GLUCOSE TWICE DAILY, Disp: , Rfl: 5  •  ONETOUCH VERIO test strip, USE TO CHECK GLUCOSE TWICE DAILY, Disp: , Rfl: 3  •  SPIRIVA RESPIMAT 1.25 MCG/ACT aerosol solution inhaler, INHALE 2 SPRAY(S) " "BY MOUTH ONCE DAILY, Disp: , Rfl: 1  •  warfarin (COUMADIN) 3 MG tablet, warfarin 3 mg tablet, Disp: , Rfl:   •  warfarin (COUMADIN) 4 MG tablet, warfarin 4 mg tablet  TAKE 1 TABLET BY MOUTH ONCE DAILY, Disp: , Rfl:   •  hydroxychloroquine (PLAQUENIL) 200 MG tablet, Take 200 mg by mouth 2 (Two) Times a Day. as directed, Disp: , Rfl:   Social History     Socioeconomic History   • Marital status:      Spouse name: Not on file   • Number of children: Not on file   • Years of education: Not on file   • Highest education level: Not on file   Tobacco Use   • Smoking status: Passive Smoke Exposure - Never Smoker   • Smokeless tobacco: Never Used   Substance and Sexual Activity   • Alcohol use: No   • Drug use: No   • Sexual activity: Defer     Allergies   Allergen Reactions   • Codeine GI Intolerance   • Sulfa Antibiotics Hives   • Tylenol With Codeine #3 [Acetaminophen-Codeine] GI Intolerance   • Vancomycin Other (See Comments)     \"red man syndrome\" - turned red from head to toe   • Naproxen Rash   • Naproxen Sodium Rash     Review of Systems   Constitution: Negative for fever and malaise/fatigue.   HENT: Negative for congestion and hearing loss.    Eyes: Negative for double vision and visual disturbance.   Cardiovascular: Negative for chest pain, claudication, dyspnea on exertion, leg swelling and syncope.   Respiratory: Negative for cough and shortness of breath.    Endocrine: Negative for cold intolerance.   Skin: Negative for color change and rash.   Musculoskeletal: Negative for arthritis and joint pain.   Gastrointestinal: Negative for abdominal pain and heartburn.   Genitourinary: Negative for hematuria.   Neurological: Negative for excessive daytime sleepiness and dizziness.   Psychiatric/Behavioral: Negative for depression. The patient is not nervous/anxious.    All other systems reviewed and are negative.             Objective:     Constitutional:       Appearance: Well-developed.   Eyes:      General: " No scleral icterus.     Conjunctiva/sclera: Conjunctivae normal.   HENT:      Head: Normocephalic and atraumatic.    Mouth/Throat:      Mouth: No oral lesions.      Pharynx: Uvula midline.   Neck:      Musculoskeletal: Neck supple.      Thyroid: No thyromegaly.      Vascular: No carotid bruit or JVD.      Trachea: Trachea normal.   Pulmonary:      Effort: Pulmonary effort is normal.      Breath sounds: Normal breath sounds.   Cardiovascular:      Normal rate. Regular rhythm.      No gallop.   Pulses:     Intact distal pulses.   Abdominal:      General: Bowel sounds are normal.      Palpations: Abdomen is soft.   Musculoskeletal: Normal range of motion.   Skin:     General: Skin is warm. There is no cyanosis.   Neurological:      Mental Status: Alert and oriented to person, place, and time.      Comments: No focal deficits   Psychiatric:         Behavior: Behavior is cooperative.         Procedures    Lab Review:       Assessment:          Diagnosis Plan   1. Coronary artery disease involving native coronary artery of native heart with unstable angina pectoris (CMS/HCC)     2. Paroxysmal atrial fibrillation (CMS/HCC)     3. Essential hypertension     4. Presence of aortocoronary bypass graft     5. Type 2 diabetes mellitus without complication, without long-term current use of insulin (CMS/HCC)     6. Mixed hyperlipidemia            Plan:       History of CAD status post CABG with recurrent symptoms of chest pressure  Hypertension dyslipidemia diabetes needs aggressive control  Suggest stress test and echocardiogram in the near future  Modify cardiac risk factors aggressively  If worsening symptoms come to the ER immediately

## 2020-12-10 ENCOUNTER — OUTSIDE FACILITY SERVICE (OUTPATIENT)
Dept: CARDIOLOGY | Facility: CLINIC | Age: 70
End: 2020-12-10

## 2020-12-10 ENCOUNTER — DOCUMENTATION (OUTPATIENT)
Dept: CARDIOLOGY | Facility: CLINIC | Age: 70
End: 2020-12-10

## 2020-12-10 PROCEDURE — 93016 CV STRESS TEST SUPVJ ONLY: CPT | Performed by: INTERNAL MEDICINE

## 2020-12-10 PROCEDURE — 93306 TTE W/DOPPLER COMPLETE: CPT | Performed by: INTERNAL MEDICINE

## 2020-12-10 PROCEDURE — 93018 CV STRESS TEST I&R ONLY: CPT | Performed by: INTERNAL MEDICINE

## 2020-12-10 PROCEDURE — 78452 HT MUSCLE IMAGE SPECT MULT: CPT | Performed by: INTERNAL MEDICINE

## 2020-12-10 RX ORDER — ISOSORBIDE MONONITRATE 30 MG/1
30 TABLET, EXTENDED RELEASE ORAL EVERY MORNING
Qty: 30 TABLET | Refills: 11 | Status: SHIPPED | OUTPATIENT
Start: 2020-12-10 | End: 2020-12-30 | Stop reason: SINTOL

## 2020-12-10 NOTE — PROGRESS NOTES
I called and discussed with the patient about the abnormal stress test today  I will start isosorbide she will continue amlodipine and metoprolol  I discussed with the patient about COVID-19 situation and elective cases  May not be possible to schedule for few weeks  If on medical treatment she is stable then we will wait but if she is not stable having recurrent symptoms advised her to come to the ER immediately to be admitted and undergo cardiac cath  No strenuous exercise

## 2020-12-11 ENCOUNTER — TELEPHONE (OUTPATIENT)
Dept: CARDIOLOGY | Facility: CLINIC | Age: 70
End: 2020-12-11

## 2020-12-11 DIAGNOSIS — I25.110 CORONARY ARTERY DISEASE INVOLVING NATIVE CORONARY ARTERY OF NATIVE HEART WITH UNSTABLE ANGINA PECTORIS (HCC): ICD-10-CM

## 2020-12-11 DIAGNOSIS — R94.39 ABNORMAL NUCLEAR STRESS TEST: Primary | ICD-10-CM

## 2020-12-11 NOTE — TELEPHONE ENCOUNTER
Pt called this morning about arranging a heart cath. Pt was given the option in [place] to either go to the ER if symptoms worsen or schedule outpatient cath. Pt decided to schedule cath on 1/4/2021 @ 2. Please put in order

## 2020-12-18 DIAGNOSIS — I48.0 PAROXYSMAL ATRIAL FIBRILLATION (HCC): ICD-10-CM

## 2020-12-18 DIAGNOSIS — I25.110 CORONARY ARTERY DISEASE INVOLVING NATIVE CORONARY ARTERY OF NATIVE HEART WITH UNSTABLE ANGINA PECTORIS (HCC): Primary | ICD-10-CM

## 2020-12-24 RX ORDER — ATORVASTATIN CALCIUM 40 MG/1
TABLET, FILM COATED ORAL
Qty: 90 TABLET | Refills: 0 | Status: ON HOLD | OUTPATIENT
Start: 2020-12-24 | End: 2021-01-11

## 2020-12-24 NOTE — TELEPHONE ENCOUNTER
LOV 11/19/2020 - [facility] PT    LABS 09/08/2020    NEXT OV - unable to see future appts since this is a Lacey brown.

## 2020-12-30 RX ORDER — ISOSORBIDE DINITRATE 10 MG/1
10 TABLET ORAL 3 TIMES DAILY
Qty: 270 TABLET | Refills: 1 | Status: SHIPPED | OUTPATIENT
Start: 2020-12-30 | End: 2021-07-12

## 2020-12-30 NOTE — TELEPHONE ENCOUNTER
Patient called stating that she has been exposed to Covid and she has not been feeling good for 3 days.   She would like to go ahead and cancel her procedure for 01/04/21 and postpone it for now until she is out of the quarantine stage and she is feeling better.     Also, she mentioned that the Isosorbide that she was recently started on is giving her very severe headaches.

## 2021-01-07 DIAGNOSIS — I25.110 CORONARY ARTERY DISEASE INVOLVING NATIVE CORONARY ARTERY OF NATIVE HEART WITH UNSTABLE ANGINA PECTORIS (HCC): ICD-10-CM

## 2021-01-07 DIAGNOSIS — R94.39 ABNORMAL NUCLEAR STRESS TEST: ICD-10-CM

## 2021-01-07 DIAGNOSIS — I48.0 PAROXYSMAL ATRIAL FIBRILLATION (HCC): ICD-10-CM

## 2021-01-08 ENCOUNTER — LAB (OUTPATIENT)
Dept: LAB | Facility: HOSPITAL | Age: 71
End: 2021-01-08

## 2021-01-08 ENCOUNTER — HOSPITAL ENCOUNTER (OUTPATIENT)
Dept: CARDIOLOGY | Facility: HOSPITAL | Age: 71
Discharge: HOME | End: 2021-01-08

## 2021-01-08 DIAGNOSIS — I48.92 ATRIAL FLUTTER, UNSPECIFIED TYPE (HCC): ICD-10-CM

## 2021-01-08 DIAGNOSIS — I10 ESSENTIAL HYPERTENSION: ICD-10-CM

## 2021-01-08 DIAGNOSIS — M06.4 INFLAMMATORY POLYARTHROPATHY (HCC): Primary | ICD-10-CM

## 2021-01-08 LAB
ANION GAP SERPL CALCULATED.3IONS-SCNC: 9.1 MMOL/L (ref 5–15)
BUN SERPL-MCNC: 19 MG/DL (ref 8–23)
BUN/CREAT SERPL: 23.8 (ref 7–25)
CALCIUM SPEC-SCNC: 9.5 MG/DL (ref 8.6–10.5)
CHLORIDE SERPL-SCNC: 102 MMOL/L (ref 98–107)
CO2 SERPL-SCNC: 27.9 MMOL/L (ref 22–29)
CREAT SERPL-MCNC: 0.8 MG/DL (ref 0.57–1)
DEPRECATED RDW RBC AUTO: 44.3 FL (ref 37–54)
ERYTHROCYTE [DISTWIDTH] IN BLOOD BY AUTOMATED COUNT: 13.3 % (ref 12.3–15.4)
GFR SERPL CREATININE-BSD FRML MDRD: 71 ML/MIN/1.73
GLUCOSE SERPL-MCNC: 114 MG/DL (ref 65–99)
HCT VFR BLD AUTO: 39 % (ref 34–46.6)
HGB BLD-MCNC: 12.5 G/DL (ref 12–15.9)
MCH RBC QN AUTO: 29.2 PG (ref 26.6–33)
MCHC RBC AUTO-ENTMCNC: 32.1 G/DL (ref 31.5–35.7)
MCV RBC AUTO: 91.1 FL (ref 79–97)
PLATELET # BLD AUTO: 244 10*3/MM3 (ref 140–450)
PMV BLD AUTO: 10.3 FL (ref 6–12)
POTASSIUM SERPL-SCNC: 4.6 MMOL/L (ref 3.5–5.2)
RBC # BLD AUTO: 4.28 10*6/MM3 (ref 3.77–5.28)
SARS-COV-2 ORF1AB RESP QL NAA+PROBE: NOT DETECTED
SODIUM SERPL-SCNC: 139 MMOL/L (ref 136–145)
WBC # BLD AUTO: 6.66 10*3/MM3 (ref 3.4–10.8)

## 2021-01-08 PROCEDURE — U0004 COV-19 TEST NON-CDC HGH THRU: HCPCS

## 2021-01-08 PROCEDURE — C9803 HOPD COVID-19 SPEC COLLECT: HCPCS

## 2021-01-08 PROCEDURE — 93010 ELECTROCARDIOGRAM REPORT: CPT | Performed by: INTERNAL MEDICINE

## 2021-01-08 PROCEDURE — 36415 COLL VENOUS BLD VENIPUNCTURE: CPT

## 2021-01-08 PROCEDURE — 85027 COMPLETE CBC AUTOMATED: CPT | Performed by: INTERNAL MEDICINE

## 2021-01-08 PROCEDURE — 93005 ELECTROCARDIOGRAM TRACING: CPT | Performed by: INTERNAL MEDICINE

## 2021-01-08 PROCEDURE — 80048 BASIC METABOLIC PNL TOTAL CA: CPT | Performed by: INTERNAL MEDICINE

## 2021-01-10 LAB — QT INTERVAL: 429 MS

## 2021-01-11 ENCOUNTER — HOSPITAL ENCOUNTER (OUTPATIENT)
Facility: HOSPITAL | Age: 71
Setting detail: HOSPITAL OUTPATIENT SURGERY
Discharge: HOME | End: 2021-01-11
Attending: INTERNAL MEDICINE | Admitting: INTERNAL MEDICINE

## 2021-01-11 VITALS
HEIGHT: 62 IN | RESPIRATION RATE: 15 BRPM | OXYGEN SATURATION: 96 % | TEMPERATURE: 97.3 F | SYSTOLIC BLOOD PRESSURE: 132 MMHG | HEART RATE: 56 BPM | BODY MASS INDEX: 38.26 KG/M2 | WEIGHT: 207.89 LBS | DIASTOLIC BLOOD PRESSURE: 48 MMHG

## 2021-01-11 DIAGNOSIS — R94.39 ABNORMAL NUCLEAR STRESS TEST: ICD-10-CM

## 2021-01-11 DIAGNOSIS — I25.110 CORONARY ARTERY DISEASE INVOLVING NATIVE CORONARY ARTERY OF NATIVE HEART WITH UNSTABLE ANGINA PECTORIS (HCC): ICD-10-CM

## 2021-01-11 LAB
GLUCOSE BLDC GLUCOMTR-MCNC: 99 MG/DL (ref 70–105)
INR PPP: 1.3 (ref 2–3)
PROTHROMBIN TIME: 14.1 SECONDS (ref 19.4–28.5)

## 2021-01-11 PROCEDURE — 82962 GLUCOSE BLOOD TEST: CPT

## 2021-01-11 PROCEDURE — 99152 MOD SED SAME PHYS/QHP 5/>YRS: CPT | Performed by: INTERNAL MEDICINE

## 2021-01-11 PROCEDURE — C1894 INTRO/SHEATH, NON-LASER: HCPCS | Performed by: INTERNAL MEDICINE

## 2021-01-11 PROCEDURE — 0 IOPAMIDOL PER 1 ML: Performed by: INTERNAL MEDICINE

## 2021-01-11 PROCEDURE — 93455 CORONARY ART/GRFT ANGIO S&I: CPT | Performed by: INTERNAL MEDICINE

## 2021-01-11 PROCEDURE — 25010000002 MIDAZOLAM PER 1 MG: Performed by: INTERNAL MEDICINE

## 2021-01-11 PROCEDURE — 99153 MOD SED SAME PHYS/QHP EA: CPT | Performed by: INTERNAL MEDICINE

## 2021-01-11 PROCEDURE — C1769 GUIDE WIRE: HCPCS | Performed by: INTERNAL MEDICINE

## 2021-01-11 PROCEDURE — 85610 PROTHROMBIN TIME: CPT | Performed by: INTERNAL MEDICINE

## 2021-01-11 PROCEDURE — 25010000002 FENTANYL CITRATE (PF) 100 MCG/2ML SOLUTION: Performed by: INTERNAL MEDICINE

## 2021-01-11 RX ORDER — DIPHENHYDRAMINE HCL 25 MG
25 CAPSULE ORAL EVERY 6 HOURS PRN
Status: DISCONTINUED | OUTPATIENT
Start: 2021-01-11 | End: 2021-01-12 | Stop reason: HOSPADM

## 2021-01-11 RX ORDER — LEVOTHYROXINE SODIUM 0.03 MG/1
25 TABLET ORAL DAILY
COMMUNITY

## 2021-01-11 RX ORDER — SODIUM CHLORIDE 9 MG/ML
30 INJECTION, SOLUTION INTRAVENOUS CONTINUOUS
Status: DISCONTINUED | OUTPATIENT
Start: 2021-01-11 | End: 2021-01-12 | Stop reason: HOSPADM

## 2021-01-11 RX ORDER — ONDANSETRON 2 MG/ML
4 INJECTION INTRAMUSCULAR; INTRAVENOUS EVERY 6 HOURS PRN
Status: DISCONTINUED | OUTPATIENT
Start: 2021-01-11 | End: 2021-01-12 | Stop reason: HOSPADM

## 2021-01-11 RX ORDER — MIDAZOLAM HYDROCHLORIDE 1 MG/ML
INJECTION INTRAMUSCULAR; INTRAVENOUS AS NEEDED
Status: DISCONTINUED | OUTPATIENT
Start: 2021-01-11 | End: 2021-01-11 | Stop reason: HOSPADM

## 2021-01-11 RX ORDER — ACETAMINOPHEN 325 MG/1
650 TABLET ORAL EVERY 4 HOURS PRN
Status: DISCONTINUED | OUTPATIENT
Start: 2021-01-11 | End: 2021-01-12 | Stop reason: HOSPADM

## 2021-01-11 RX ORDER — SODIUM CHLORIDE 9 MG/ML
INJECTION, SOLUTION INTRAVENOUS CONTINUOUS PRN
Status: COMPLETED | OUTPATIENT
Start: 2021-01-11 | End: 2021-01-11

## 2021-01-11 RX ORDER — SODIUM CHLORIDE 9 MG/ML
250 INJECTION, SOLUTION INTRAVENOUS ONCE AS NEEDED
Status: DISCONTINUED | OUTPATIENT
Start: 2021-01-11 | End: 2021-01-12 | Stop reason: HOSPADM

## 2021-01-11 RX ORDER — ALUMINA, MAGNESIA, AND SIMETHICONE 2400; 2400; 240 MG/30ML; MG/30ML; MG/30ML
15 SUSPENSION ORAL EVERY 6 HOURS PRN
Status: DISCONTINUED | OUTPATIENT
Start: 2021-01-11 | End: 2021-01-12 | Stop reason: HOSPADM

## 2021-01-11 RX ORDER — ONDANSETRON 4 MG/1
4 TABLET, FILM COATED ORAL EVERY 6 HOURS PRN
Status: DISCONTINUED | OUTPATIENT
Start: 2021-01-11 | End: 2021-01-12 | Stop reason: HOSPADM

## 2021-01-11 RX ORDER — LIDOCAINE HYDROCHLORIDE 20 MG/ML
INJECTION, SOLUTION INFILTRATION; PERINEURAL AS NEEDED
Status: DISCONTINUED | OUTPATIENT
Start: 2021-01-11 | End: 2021-01-11 | Stop reason: HOSPADM

## 2021-01-11 RX ORDER — FENTANYL CITRATE 50 UG/ML
INJECTION, SOLUTION INTRAMUSCULAR; INTRAVENOUS AS NEEDED
Status: DISCONTINUED | OUTPATIENT
Start: 2021-01-11 | End: 2021-01-11 | Stop reason: HOSPADM

## 2021-01-11 RX ORDER — AMLODIPINE BESYLATE 5 MG/1
5 TABLET ORAL DAILY
COMMUNITY
End: 2021-02-08

## 2021-01-11 RX ORDER — ATORVASTATIN CALCIUM 40 MG/1
40 TABLET, FILM COATED ORAL NIGHTLY
COMMUNITY
End: 2021-06-25

## 2021-01-11 NOTE — CONSULTS
70-year-old white female patient with known history of CAD underwent CABG couple of years ago history of atrial fibrillation underwent ablation now admitted to hospital for elective cardiac catheterization    Patient is having recurrent symptoms of chest pain stress test was abnormal    Her medications were reviewed    Social history does not smoke or drink    History of hypertension      Review of system all the other systems were reviewed they were otherwise negative    Physical exam  Vital stable neck no JVP elevation lungs bilateral mostly clear heart sounds S1-S2 regular extremities no edema bilateral pulses present equal    Assessment and plan  CAD status post CABG with recurrent symptoms and abnormal stress test we will proceed with cardiac catheterization

## 2021-01-11 NOTE — DISCHARGE INSTR - ACTIVITY
Follow up with Dr. Jean Baptiste in 2-4 weeks  Please call the office to schedule appointment [phone]

## 2021-01-11 NOTE — DISCHARGE INSTRUCTIONS
Post Cath Instructions      1) Drink plenty of fluids for the next 24 hours.  This helps to eliminate the dye used in your procedure through urination.  You may resume a normal diet; however, try to avoid foods that would cause gas or constipation.    2) Sedative medication given to you during your catheterization may decrease your judgement and reaction time for up to 24-48 hours.  Therefore:  a. DO NOT drive or operate hazardous machinery (48 hours)  b. DO NOT consume alcoholic beverages  c. DO NOT make any important/legal decisions  d. Have someone stay with you for at least 24 hours    3) To allow proper healing and prevent bleeding, the following activities are to be strictly avoided for the next 24-48 hours:  a. Excessive bending at wound site  b. Straining (anything that would tense up muscles around the affected puncture site)  c. Lifting objects greater than 5 pounds, pushing, or pulling for 5 days  i. For Groin Cases:  1. Refrain from sexual activity  2. Refrain from running or vigorous walking  3. No prolonged sitting or standing  4. Limit stair climbing as much as possible    4) Keep the puncture site clean and dry.  You may remove the dressing tomorrow and replace it with a band-aid for at least one additional day.  Gently clean the site with mild soap and water.  No scrubbing/rubbing and lightly pat the area dry.  Showers are acceptable; however, avoid submerging in water (tub baths, hot tubs, swimming pools, dishwater, etc…) for at least one week.  The site should be completely healed before resuming these activities to reduce the risk of infection.  Check the site often.  Watch for signs and symptoms of infection and notify your physician if any of the following occur:  a. Bleeding or an increase in swelling at the puncture site  b. Fever  c. Increased soreness around puncture site  d. Foul odor or significant drainage from the puncture site  e. Swelling, redness, or warmth at the puncture  site    **A bruise or small “pea sized” lump under the skin at the puncture site is not unusual.  This should disappear within 3-4 weeks.**  5) CONTACT YOUR PHYSICIAN OR CALL 911 IF YOU EXPERIENCE ANY OF THE FOLLOWING:  a. Increased angina (chest pain) or frequent sensations of pressure, burning, pain, or other discomfort in the chest, arm, jaws, or stomach  b. Lightheadedness, dizziness, faint feeling, sweating, or difficulty breathing  c. Odd sensation changes like numbness, tingling, coldness, or pain in the arm or leg in which the catheter was inserted  d. Limb in which the catheter was inserted becomes pale/bluish in color    IMPORTANT:  Although this occurs very rarely, if you should develop bright red or excessive bleeding, feel a “pop” inside at the insertion site, or notice a sudden increase in swelling larger than a walnut, you should call 911.  Hold continuous firm pressure to the access site until emergency personnel arrive.  It is best if someone else can do this for you.

## 2021-01-28 ENCOUNTER — TELEPHONE (OUTPATIENT)
Dept: CARDIOLOGY | Facility: CLINIC | Age: 71
End: 2021-01-28

## 2021-01-28 NOTE — TELEPHONE ENCOUNTER
Patient was suppose to have a follow-up visit with you today in [place]. Patient had a cath done on 01/11/21 and you mentioned that there were 2 blockages. Patient is concerned how you would like to treat this. She wants to know if she will need to be started on medications or if you will need to do a stent or anything.     Patient would like to know if she can do a phone visit with you to discuss or does she need to come in to the office to see you.

## 2021-01-29 ENCOUNTER — OFFICE VISIT (OUTPATIENT)
Dept: CARDIOLOGY | Facility: CLINIC | Age: 71
End: 2021-01-29

## 2021-01-29 VITALS — HEIGHT: 63 IN | BODY MASS INDEX: 36.68 KG/M2 | WEIGHT: 207 LBS

## 2021-01-29 DIAGNOSIS — E78.2 MIXED HYPERLIPIDEMIA: ICD-10-CM

## 2021-01-29 DIAGNOSIS — R00.2 PALPITATIONS: ICD-10-CM

## 2021-01-29 DIAGNOSIS — I48.19 PERSISTENT ATRIAL FIBRILLATION (HCC): ICD-10-CM

## 2021-01-29 DIAGNOSIS — I25.10 CORONARY ARTERY DISEASE INVOLVING NATIVE CORONARY ARTERY OF NATIVE HEART WITHOUT ANGINA PECTORIS: Primary | ICD-10-CM

## 2021-01-29 DIAGNOSIS — I10 ESSENTIAL HYPERTENSION: ICD-10-CM

## 2021-01-29 PROCEDURE — 99442 PR PHYS/QHP TELEPHONE EVALUATION 11-20 MIN: CPT | Performed by: INTERNAL MEDICINE

## 2021-01-29 NOTE — PROGRESS NOTES
You have chosen to receive care through a telephone visit. Do you consent to use a telephone visit for your medical care today? Yes  This visit has been rescheduled as a phone visit to comply with patient safety concerns in accordance with CDC recommendations. Total time of discussion was 12 minutes.      Subjective:     Encounter Date:01/29/2021      Patient ID: Mary Deleon is a 70 y.o. female.    Chief Complaint: Post-Op Cardiac Cath  History of Present Illness     70-year-old white female patient with known history of obesity,  hypertension and dyslipidemia,  chronic atrial fibrillation, doing a telephone visit today         Due to the recurrent symptoms patient underwent right and left cardiac catheterization June 2017  Patient found to have proximal LAD 70% mid LAD 70% obtuse marginal branch ostium 80% RCA ostium 80%  Patient underwent lima to LAD vein graft to the PDA and obtuse marginal branch Maze procedure     Patient was started on amiodarone and underwent cardioversion     patient went into sinus rhythm but significant sinus bradycardia so she stopped the amiodarone and digoxin  Due to recurrent AFib patient underwent ablation 2nd time in April 2019   transesophageal echocardiogram was done  April 2019   showed moderate LVH bilateral atrial enlargement normal EF   left atrial appendage is patent  Patient decided not to take hydroxychloroquine    Due to recurrent symptoms underwent stress Myoview December 2020 which showed anteroapical ischemia    December 2020 echocardiogram EF 60% moderate mitral insufficiency mild pulmonary hypertension RV dilatation noted    Patient underwent cardiac catheterization  2 out of 3 grafts were open the vein graft to the marginal branch was occluded LIMA to LAD was open but flow to the diagonal arteries were compromised  Both diagonal arteries have up to 70 to 80% stenosis  Patient may need complicated procedure to stent proximal LAD to help with the diagonal artery  "flow  The ostial marginal branch has 70% stenosis the vein graft to the marginal branch was occluded mid to distal marginal branch is severely diseased  Vein graft to the RCA is open  So it was decided aggressive medical treatment cardiac risk factor modification        The following portions of the patient's history were reviewed and updated as appropriate: Allergies current medications past family history past medical history past social history past surgical history problem list and review of systems  Past Medical History:   Diagnosis Date   • Arthritis    • Atrial fibrillation (CMS/HCC)    • COPD (chronic obstructive pulmonary disease) (CMS/HCC)    • Coronary artery disease    • Depression    • Diverticulitis    • Hyperlipidemia    • Hypertension    • Mitral valve prolapse      Past Surgical History:   Procedure Laterality Date   • CARDIAC CATHETERIZATION N/A 1/11/2021    Procedure: Coronary angiography;  Surgeon: Al Garcia MD;  Location: [facility] CATH INVASIVE LOCATION;  Service: Cardiovascular;  Laterality: N/A;   • CARDIAC CATHETERIZATION N/A 1/11/2021    Procedure: Saphenous Vein Graft;  Surgeon: Al Garcia MD;  Location: [facility] CATH INVASIVE LOCATION;  Service: Cardiovascular;  Laterality: N/A;   • HYSTERECTOMY     • KNEE SURGERY       Ht 160 cm (63\")   Wt 93.9 kg (207 lb)   Breastfeeding No   BMI 36.67 kg/m²   Family History   Problem Relation Age of Onset   • Heart disease Mother    • Pneumonia Mother    • Asthma Father    • Alzheimer's disease Father    • Heart disease Father        Current Outpatient Medications:   •  amLODIPine (NORVASC) 5 MG tablet, Take 5 mg by mouth Daily., Disp: , Rfl:   •  aspirin 81 MG EC tablet, Take 81 mg by mouth Daily., Disp: , Rfl:   •  atorvastatin (LIPITOR) 40 MG tablet, Take 40 mg by mouth Every Night., Disp: , Rfl:   •  BREO ELLIPTA 100-25 MCG/INH inhaler, Inhale 1 puff Daily., Disp: , Rfl: 5  •  cetirizine (zyrTEC) 5 MG tablet, Take " "10 mg by mouth Daily., Disp: , Rfl:   •  Docusate Sodium (COLACE PO), Take 2 capsules by mouth 2 (two) times a day., Disp: , Rfl:   •  isosorbide dinitrate (ISORDIL) 10 MG tablet, Take 1 tablet by mouth 3 (Three) Times a Day., Disp: 270 tablet, Rfl: 1  •  levothyroxine (Euthyrox) 25 MCG tablet, Take 25 mcg by mouth Daily., Disp: , Rfl:   •  metFORMIN (GLUCOPHAGE) 500 MG tablet, Take 500 mg by mouth 2 (Two) Times a Day., Disp: , Rfl: 1  •  metoprolol tartrate (LOPRESSOR) 25 MG tablet, Take 25 mg by mouth 2 (Two) Times a Day., Disp: , Rfl:   •  montelukast (SINGULAIR) 10 MG tablet, Take 10 mg by mouth Daily., Disp: , Rfl:   •  Omega-3 Fatty Acids (fish oil) 500 MG capsule capsule, Take 500 mg by mouth Daily With Breakfast., Disp: , Rfl:   •  ONETOUCH DELICA LANCETS FINE misc, USE TO CHECK GLUCOSE TWICE DAILY, Disp: , Rfl: 5  •  ONETOUCH VERIO test strip, USE TO CHECK GLUCOSE TWICE DAILY, Disp: , Rfl: 3  •  SPIRIVA RESPIMAT 1.25 MCG/ACT aerosol solution inhaler, Inhale 2 puffs Daily., Disp: , Rfl: 1  •  warfarin (COUMADIN) 3 MG tablet, Take 3 mg by mouth 1 (One) Time Per Week. Tues, Disp: , Rfl:   •  warfarin (COUMADIN) 4 MG tablet, Take 4 mg by mouth Daily. Except Tues, takes 3 mg on Tues., Disp: , Rfl:   Social History     Socioeconomic History   • Marital status:      Spouse name: Not on file   • Number of children: Not on file   • Years of education: Not on file   • Highest education level: Not on file   Tobacco Use   • Smoking status: Passive Smoke Exposure - Never Smoker   • Smokeless tobacco: Never Used   Substance and Sexual Activity   • Alcohol use: No   • Drug use: No   • Sexual activity: Defer     Allergies   Allergen Reactions   • Codeine GI Intolerance   • Sulfa Antibiotics Hives   • Tylenol With Codeine #3 [Acetaminophen-Codeine] GI Intolerance   • Vancomycin Other (See Comments)     \"red man syndrome\" - turned red from head to toe   • Naproxen Rash   • Naproxen Sodium Rash     Review of Systems "   Constitution: Negative for fever and malaise/fatigue.   HENT: Negative for congestion and hearing loss.    Eyes: Negative for double vision and visual disturbance.   Cardiovascular: Positive for irregular heartbeat. Negative for chest pain, claudication, dyspnea on exertion, leg swelling and syncope.   Respiratory: Negative for cough and shortness of breath.    Endocrine: Negative for cold intolerance.   Skin: Negative for color change and rash.   Musculoskeletal: Negative for arthritis and joint pain.   Gastrointestinal: Negative for abdominal pain and heartburn.   Genitourinary: Negative for hematuria.   Neurological: Positive for dizziness (when bending over) and numbness. Negative for excessive daytime sleepiness.   Psychiatric/Behavioral: Negative for depression. The patient is not nervous/anxious.    All other systems reviewed and are negative.             Objective:     Physical Exam    Procedures    Lab Review:       Assessment:          Diagnosis Plan   1. Coronary artery disease involving native coronary artery of native heart without angina pectoris     2. Palpitations     3. Essential hypertension     4. Mixed hyperlipidemia     5. Persistent atrial fibrillation (CMS/HCC)            Plan:       MDM  Number of Diagnoses or Management Options  Coronary artery disease involving native coronary artery of native heart without angina pectoris: established, improving  Essential hypertension: established, improving  Mixed hyperlipidemia: established, improving  Palpitations: established, improving  Persistent atrial fibrillation (CMS/HCC): established, improving  Risk of Complications, Morbidity, and/or Mortality  Presenting problems: moderate  Management options: moderate    Patient Progress  Patient progress: stable    Patient is doing reasonably well we will try aggressive medical treatment had few palpitations but nothing significant I advised her to follow-up with me in 4 weeks we will do EKG at the  time

## 2021-02-08 RX ORDER — AMLODIPINE BESYLATE 5 MG/1
TABLET ORAL
Qty: 90 TABLET | Refills: 0 | Status: SHIPPED | OUTPATIENT
Start: 2021-02-08 | End: 2021-05-07

## 2021-02-22 ENCOUNTER — OFFICE VISIT (OUTPATIENT)
Dept: CARDIOLOGY | Facility: CLINIC | Age: 71
End: 2021-02-22

## 2021-02-22 VITALS
BODY MASS INDEX: 37.91 KG/M2 | OXYGEN SATURATION: 95 % | SYSTOLIC BLOOD PRESSURE: 149 MMHG | HEART RATE: 55 BPM | WEIGHT: 214 LBS | DIASTOLIC BLOOD PRESSURE: 77 MMHG

## 2021-02-22 DIAGNOSIS — I25.110 CORONARY ARTERY DISEASE INVOLVING NATIVE CORONARY ARTERY OF NATIVE HEART WITH UNSTABLE ANGINA PECTORIS (HCC): ICD-10-CM

## 2021-02-22 DIAGNOSIS — I25.10 CORONARY ARTERY DISEASE INVOLVING NATIVE CORONARY ARTERY OF NATIVE HEART WITHOUT ANGINA PECTORIS: Primary | ICD-10-CM

## 2021-02-22 DIAGNOSIS — E78.2 MIXED HYPERLIPIDEMIA: ICD-10-CM

## 2021-02-22 DIAGNOSIS — I48.0 PAROXYSMAL ATRIAL FIBRILLATION (HCC): ICD-10-CM

## 2021-02-22 DIAGNOSIS — I10 ESSENTIAL HYPERTENSION: ICD-10-CM

## 2021-02-22 PROCEDURE — 93000 ELECTROCARDIOGRAM COMPLETE: CPT | Performed by: INTERNAL MEDICINE

## 2021-02-22 PROCEDURE — 99214 OFFICE O/P EST MOD 30 MIN: CPT | Performed by: INTERNAL MEDICINE

## 2021-02-22 RX ORDER — METOPROLOL SUCCINATE 25 MG/1
25 TABLET, EXTENDED RELEASE ORAL DAILY
Qty: 90 TABLET | Refills: 3 | Status: SHIPPED | OUTPATIENT
Start: 2021-02-22 | End: 2021-12-20 | Stop reason: SDUPTHER

## 2021-02-22 NOTE — PROGRESS NOTES
CC-Atrial fibrillation, sleep apnea         Sub--70-year-old female patient underwent prior cardioversion and has prior bradycardia post cardioversion and patient was on digoxin and amiodarone which have been stopped and left on low-dose of beta-blockers and started having recurrent arrhythmias .Patient has known coronary artery disease with prior bypass surgery done in 2017 with concomitant Maze procedure.   patient was on amiodarone and beta-blockers and digoxin and patient was left on low-dose of beta-blockers with recurrence of arrhythmias And patient was found to have severe biatrial enlargement and resistant right atrial flutter and underwent atrial flutter ablation which was a counter-clockwise right-sided flutter and after that patient had recurrent atrial fibrillation and underwent cryoablation  months ago and feels remarkably well since ablation and comes in for follow-up   She is using her CPAP machine  She has also has rheumatoid arthritis without any recent flareup  She feels remarkably well  She is very compliant with her medications          Past Medical History:     Reviewed history from 10/18/2018 and no changes required:        Hypertension        Dyslipidemia        Coronary Artery Disease (05/25/2017)        C O P D        Chronic-Atrial Fibrillation        S/P CABG x3 Vessels        Atrial Flutter     Past Surgical History:     Reviewed history from 05/30/2019 and no changes required:        Cardiac Cath 2003  with narrowing distal LAD and prox Cx.         Hyster and BSO        Torn miniscus on left knee x2        Heart Catherization (05/24/2017)        C A B G: x3 Vessels, LIMA to LAD, SVG to PDA & SVG to OM3 (05/25/2017)        Cardioversion 9/24/18         Ablation 1/2/19 Dr. MAST                Physical Exam     General:      well developed, well nourished, in no acute distress.    Head:      normocephalic and atraumatic.    Eyes:      PERRL/EOM intact, conjunctiva and sclera clear with out  nystagmus.    Neck:      no masses, thyromegaly, TRACHEA CENTRAL WITH NORMAL RESPIRATORY EFFORT  Lungs:      clear bilaterally to auscultation.    Heart:      regular rate and rhythm, S1, S2 without murmurs, rubs, or gallops                assessment plan   recurrent atrial arrhythmias in the form of atrial flutter / atrial fibrillation with prior Maze procedure-- post right atrial flutter ablation with recurrence of atrial fibrillation-- post cryoablation with resolution of symptoms  Treated sleep apnea   coronary artery disease with prior bypass surgery--stable on imdur   hypertension   diabetes      follow-up 6 months   medications reviewed   kindly note patient's left atrial appendage is patent  Asymptomatic PVCs  Refill for metoprolol given        ECG 12 Lead    Date/Time: 2/22/2021 9:44 AM  Performed by: Louie Rios MD  Authorized by: Louie Rios MD   Comparison: compared with previous ECG   Similar to previous ECG  Rhythm: sinus rhythm  Rate: normal  Conduction: conduction normal            Electronically signed by Louie Rios MD, 02/22/21, 9:44 AM EST.

## 2021-02-25 ENCOUNTER — OFFICE VISIT (OUTPATIENT)
Dept: CARDIOLOGY | Facility: CLINIC | Age: 71
End: 2021-02-25

## 2021-02-25 VITALS
BODY MASS INDEX: 37.39 KG/M2 | OXYGEN SATURATION: 97 % | DIASTOLIC BLOOD PRESSURE: 70 MMHG | WEIGHT: 211 LBS | HEART RATE: 97 BPM | HEIGHT: 63 IN | SYSTOLIC BLOOD PRESSURE: 146 MMHG

## 2021-02-25 DIAGNOSIS — I10 ESSENTIAL HYPERTENSION: ICD-10-CM

## 2021-02-25 DIAGNOSIS — E78.5 DYSLIPIDEMIA: ICD-10-CM

## 2021-02-25 DIAGNOSIS — I48.0 PAROXYSMAL ATRIAL FIBRILLATION (HCC): ICD-10-CM

## 2021-02-25 DIAGNOSIS — Z95.1 PRESENCE OF AORTOCORONARY BYPASS GRAFT: ICD-10-CM

## 2021-02-25 DIAGNOSIS — I25.110 CORONARY ARTERY DISEASE INVOLVING NATIVE CORONARY ARTERY OF NATIVE HEART WITH UNSTABLE ANGINA PECTORIS (HCC): Primary | ICD-10-CM

## 2021-02-25 PROCEDURE — 99214 OFFICE O/P EST MOD 30 MIN: CPT | Performed by: INTERNAL MEDICINE

## 2021-02-25 NOTE — PROGRESS NOTES
Subjective:     Encounter Date:02/25/2021      Patient ID: Mary Deleon is a 70 y.o. female.    Chief Complaint: Chest Pain - A-Fib - Recent Cath  History of Present Illness     70-year-old white female patient with known history of obesity,  hypertension and dyslipidemia,  chronic atrial fibrillation, doing a telephone visit today           Due to the recurrent symptoms patient underwent right and left cardiac catheterization June 2017  Patient found to have proximal LAD 70% mid LAD 70% obtuse marginal branch ostium 80% RCA ostium 80%  Patient underwent lima to LAD vein graft to the PDA and obtuse marginal branch Maze procedure     Patient was started on amiodarone and underwent cardioversion      patient went into sinus rhythm but significant sinus bradycardia so she stopped the amiodarone and digoxin  Due to recurrent AFib patient underwent ablation 2nd time in April 2019   transesophageal echocardiogram was done  April 2019   showed moderate LVH bilateral atrial enlargement normal EF   left atrial appendage is patent  Patient decided not to take hydroxychloroquine     Due to recurrent symptoms underwent stress Myoview December 2020 which showed anteroapical ischemia     December 2020 echocardiogram EF 60% moderate mitral insufficiency mild pulmonary hypertension RV dilatation noted     Patient underwent cardiac catheterization  2 out of 3 grafts were open the vein graft to the marginal branch was occluded LIMA to LAD was open but flow to the diagonal arteries were compromised  Both diagonal arteries have up to 70 to 80% stenosis  Patient may need complicated procedure to stent proximal LAD to help with the diagonal artery flow  The ostial marginal branch has 70% stenosis the vein graft to the marginal branch was occluded mid to distal marginal branch is severely diseased  Vein graft to the RCA is open  So it was decided aggressive medical treatment cardiac risk factor modification  Patient is currently  "on Isordil 10 mg 3 times daily she did not tolerate the long-acting nitrate  On Toprol-XL now 25 mg every day  Chest pain better but still happening so I am going to start Ranexa 500 mg twice daily follow-up in the next 4 weeks with CMP       The following portions of the patient's history were reviewed and updated as appropriate: Allergies current medications past family history past medical history past social history past surgical history problem list and review of systems  Past Medical History:   Diagnosis Date   • Arthritis    • Atrial fibrillation (CMS/HCC)    • COPD (chronic obstructive pulmonary disease) (CMS/HCC)    • Coronary artery disease    • Depression    • Diverticulitis    • Hyperlipidemia    • Hypertension    • Mitral valve prolapse      Past Surgical History:   Procedure Laterality Date   • CARDIAC CATHETERIZATION N/A 1/11/2021    Procedure: Coronary angiography;  Surgeon: Al Garcia MD;  Location: [facility] CATH INVASIVE LOCATION;  Service: Cardiovascular;  Laterality: N/A;   • CARDIAC CATHETERIZATION N/A 1/11/2021    Procedure: Saphenous Vein Graft;  Surgeon: Al Garcia MD;  Location: [facility] CATH INVASIVE LOCATION;  Service: Cardiovascular;  Laterality: N/A;   • HYSTERECTOMY     • KNEE SURGERY       /70 (BP Location: Left arm, Patient Position: Sitting, Cuff Size: Adult)   Pulse 97   Ht 160 cm (63\")   Wt 95.7 kg (211 lb)   SpO2 97%   BMI 37.38 kg/m²   Family History   Problem Relation Age of Onset   • Heart disease Mother    • Pneumonia Mother    • Asthma Father    • Alzheimer's disease Father    • Heart disease Father        Current Outpatient Medications:   •  amLODIPine (NORVASC) 5 MG tablet, Take 1 tablet by mouth once daily, Disp: 90 tablet, Rfl: 0  •  aspirin 81 MG EC tablet, Take 81 mg by mouth Daily., Disp: , Rfl:   •  atorvastatin (LIPITOR) 40 MG tablet, Take 40 mg by mouth Every Night., Disp: , Rfl:   •  BREO ELLIPTA 100-25 MCG/INH inhaler, " "Inhale 1 puff Daily., Disp: , Rfl: 5  •  cetirizine (zyrTEC) 5 MG tablet, Take 10 mg by mouth Daily., Disp: , Rfl:   •  Docusate Sodium (COLACE PO), Take 2 capsules by mouth 2 (two) times a day., Disp: , Rfl:   •  isosorbide dinitrate (ISORDIL) 10 MG tablet, Take 1 tablet by mouth 3 (Three) Times a Day., Disp: 270 tablet, Rfl: 1  •  levothyroxine (Euthyrox) 25 MCG tablet, Take 25 mcg by mouth Daily., Disp: , Rfl:   •  metFORMIN (GLUCOPHAGE) 500 MG tablet, Take 500 mg by mouth 2 (Two) Times a Day., Disp: , Rfl: 1  •  metoprolol succinate XL (TOPROL-XL) 25 MG 24 hr tablet, Take 1 tablet by mouth Daily., Disp: 90 tablet, Rfl: 3  •  montelukast (SINGULAIR) 10 MG tablet, Take 10 mg by mouth Daily., Disp: , Rfl:   •  Omega-3 Fatty Acids (fish oil) 500 MG capsule capsule, Take 500 mg by mouth Daily With Breakfast., Disp: , Rfl:   •  ONETOUCH DELICA LANCETS FINE misc, USE TO CHECK GLUCOSE TWICE DAILY, Disp: , Rfl: 5  •  ONETOUCH VERIO test strip, USE TO CHECK GLUCOSE TWICE DAILY, Disp: , Rfl: 3  •  SPIRIVA RESPIMAT 1.25 MCG/ACT aerosol solution inhaler, Inhale 2 puffs Daily., Disp: , Rfl: 1  •  warfarin (COUMADIN) 3 MG tablet, Take 3 mg by mouth 1 (One) Time Per Week. Tues, Disp: , Rfl:   •  warfarin (COUMADIN) 4 MG tablet, Take 4 mg by mouth Daily. Except Tues, takes 3 mg on Tues., Disp: , Rfl:   Social History     Socioeconomic History   • Marital status:      Spouse name: Not on file   • Number of children: Not on file   • Years of education: Not on file   • Highest education level: Not on file   Tobacco Use   • Smoking status: Passive Smoke Exposure - Never Smoker   • Smokeless tobacco: Never Used   Substance and Sexual Activity   • Alcohol use: No   • Drug use: No   • Sexual activity: Defer     Allergies   Allergen Reactions   • Codeine GI Intolerance   • Sulfa Antibiotics Hives   • Tylenol With Codeine #3 [Acetaminophen-Codeine] GI Intolerance   • Vancomycin Other (See Comments)     \"red man syndrome\" - turned " red from head to toe   • Naproxen Rash   • Naproxen Sodium Rash     Review of Systems   Constitution: Negative for fever and malaise/fatigue.   HENT: Negative for congestion and hearing loss.    Eyes: Negative for double vision and visual disturbance.   Cardiovascular: Positive for chest pain, dyspnea on exertion and leg swelling. Negative for claudication and syncope.   Respiratory: Positive for shortness of breath. Negative for cough.    Endocrine: Negative for cold intolerance.   Skin: Negative for color change and rash.   Musculoskeletal: Positive for arthritis and joint pain.   Gastrointestinal: Negative for abdominal pain and heartburn.   Genitourinary: Negative for hematuria.   Neurological: Negative for excessive daytime sleepiness and dizziness.   Psychiatric/Behavioral: Negative for depression. The patient is not nervous/anxious.    All other systems reviewed and are negative.             Objective:     Physical Exam  Vitals reviewed alert not in any acute distress  Neck no JVP elevation lungs bilateral entry mostly clear  Heart sounds S1-S2 regular  Abdomen soft nontender bowel sounds present  Extremities no edema bilateral pulses present equal  Procedures    Lab Review:       Assessment:          Diagnosis Plan   1. Coronary artery disease involving native coronary artery of native heart with unstable angina pectoris (CMS/HCC)     2. Dyslipidemia     3. Essential hypertension     4. Presence of aortocoronary bypass graft     5. Paroxysmal atrial fibrillation (CMS/HCC)            Plan:       MDM  Number of Diagnoses or Management Options  Coronary artery disease involving native coronary artery of native heart with unstable angina pectoris (CMS/HCC): established, worsening  Dyslipidemia: established, improving  Essential hypertension: established, improving  Paroxysmal atrial fibrillation (CMS/HCC): established, improving  Presence of aortocoronary bypass graft: established, worsening     Amount and/or  Complexity of Data Reviewed  Clinical lab tests: ordered  Review and summarize past medical records: yes    Risk of Complications, Morbidity, and/or Mortality  Presenting problems: moderate  Management options: moderate    Patient Progress  Patient progress: other (comment)

## 2021-02-26 ENCOUNTER — ANTICOAGULATION VISIT (OUTPATIENT)
Dept: CARDIOLOGY | Facility: CLINIC | Age: 71
End: 2021-02-26

## 2021-02-26 DIAGNOSIS — I48.21 PERMANENT ATRIAL FIBRILLATION (HCC): ICD-10-CM

## 2021-02-26 DIAGNOSIS — I48.0 PAROXYSMAL ATRIAL FIBRILLATION (HCC): Primary | ICD-10-CM

## 2021-02-26 DIAGNOSIS — Z79.01 LONG TERM (CURRENT) USE OF ANTICOAGULANTS: ICD-10-CM

## 2021-02-26 LAB — INR PPP: 1.9

## 2021-02-26 RX ORDER — WARFARIN SODIUM 4 MG/1
TABLET ORAL
Qty: 90 TABLET | Refills: 0 | Status: SHIPPED | OUTPATIENT
Start: 2021-02-26

## 2021-02-26 NOTE — TELEPHONE ENCOUNTER
Rec'd INR on patient from Dr. Issa's office. Called spoke with Cynthia at Dr. Issa's office advised we have not been managing INR. Per the patient Dr. Issa manages INR and prescribes her Warfarin.    LMOM for patient to return call to confirm her recent INR was addressed.

## 2021-02-26 NOTE — TELEPHONE ENCOUNTER
Spoke with patient she wants us to manage INR as she states Dr. Issa could not get her INR undercontrol. Advised patient we will have to be the only prescriber for her Warfarin and she will have to go for INR checks monthly.       Patient states she takes 4mg warfarin everyday except 3mg tab on Tuesdays.     Pt request 90 day supply of warfarin nsent to [facility] [place] for her 4mg tabs      Please sign encounter to send rx to pharmacy       Thanks

## 2021-03-08 ENCOUNTER — TELEPHONE (OUTPATIENT)
Dept: CARDIOLOGY | Facility: CLINIC | Age: 71
End: 2021-03-08

## 2021-03-08 NOTE — TELEPHONE ENCOUNTER
Pt states that she was started on Ranexa 500 BID, it is causing dizziness. She has only been taking one a day.

## 2021-03-09 NOTE — TELEPHONE ENCOUNTER
Called and spoke with patient.   Advised it was okay to decrease Ranexa to once daily and monitor symptoms for improvement.     She will also have her labs done at [facility] soon.

## 2021-04-01 ENCOUNTER — OFFICE VISIT (OUTPATIENT)
Dept: CARDIOLOGY | Facility: CLINIC | Age: 71
End: 2021-04-01

## 2021-04-01 VITALS
WEIGHT: 210 LBS | HEIGHT: 63 IN | BODY MASS INDEX: 37.21 KG/M2 | SYSTOLIC BLOOD PRESSURE: 138 MMHG | OXYGEN SATURATION: 96 % | HEART RATE: 59 BPM | DIASTOLIC BLOOD PRESSURE: 66 MMHG

## 2021-04-01 DIAGNOSIS — I48.21 PERMANENT ATRIAL FIBRILLATION (HCC): ICD-10-CM

## 2021-04-01 DIAGNOSIS — R94.39 ABNORMAL NUCLEAR STRESS TEST: Primary | ICD-10-CM

## 2021-04-01 DIAGNOSIS — Z95.1 PRESENCE OF AORTOCORONARY BYPASS GRAFT: ICD-10-CM

## 2021-04-01 DIAGNOSIS — E78.5 DYSLIPIDEMIA: ICD-10-CM

## 2021-04-01 DIAGNOSIS — I10 ESSENTIAL HYPERTENSION: ICD-10-CM

## 2021-04-01 DIAGNOSIS — I25.110 CORONARY ARTERY DISEASE INVOLVING NATIVE CORONARY ARTERY OF NATIVE HEART WITH UNSTABLE ANGINA PECTORIS (HCC): ICD-10-CM

## 2021-04-01 PROCEDURE — 99213 OFFICE O/P EST LOW 20 MIN: CPT | Performed by: INTERNAL MEDICINE

## 2021-04-01 NOTE — PROGRESS NOTES
Subjective:     Encounter Date:04/01/2021      Patient ID: Mary Deleon is a 70 y.o. female.    Chief Complaint: Atrial Fibrillation, Hypertension, & Dyslipidemia  History of Present Illness     70-year-old white female patient with known history of obesity,  hypertension and dyslipidemia,  chronic atrial fibrillation, Comes back for follow-up            patient underwent right and left cardiac catheterization June 2017  Patient found to have proximal LAD 70% mid LAD 70% obtuse marginal branch ostium 80% RCA ostium 80%  Patient underwent lima to LAD vein graft to the PDA and obtuse marginal branch Maze procedure     Patient was started on amiodarone and underwent cardioversion      patient went into sinus rhythm but significant sinus bradycardia so she stopped the amiodarone and digoxin  Due to recurrent AFib patient underwent ablation 2nd time in April 2019   transesophageal echocardiogram was done  April 2019   showed moderate LVH bilateral atrial enlargement normal EF   left atrial appendage is patent       Due to recurrent symptoms underwent stress Myoview December 2020 which showed anteroapical ischemia     December 2020 echocardiogram EF 60% moderate mitral insufficiency mild pulmonary hypertension RV dilatation noted     Patient underwent cardiac catheterization  2 out of 3 grafts were open the vein graft to the marginal branch was occluded LIMA to LAD was open but flow to the diagonal arteries were compromised  Both diagonal arteries have up to 70 to 80% stenosis  Patient may need complicated procedure to stent proximal LAD to help with the diagonal artery flow  The ostial marginal branch has 70% stenosis the vein graft to the marginal branch was occluded mid to distal marginal branch is severely diseased  Vein graft to the RCA is open  So it was decided aggressive medical treatment cardiac risk factor modification  Patient is currently on Isordil 10 mg 3 times daily she did not tolerate the  "long-acting nitrate  On Toprol-XL now 25 mg every day  Patient was given Ranexa but she stopped it due to the dizziness  Patient is feeling reasonably well would like to continue medical treatment before proceeding with possible high risk PCI  Recent labs CMP within normal limit    The following portions of the patient's history were reviewed and updated as appropriate: Allergies current medications past family history past medical history past social history past surgical history problem list and review of systems  Past Medical History:   Diagnosis Date   • Arthritis    • Atrial fibrillation (CMS/HCC)    • COPD (chronic obstructive pulmonary disease) (CMS/HCC)    • Coronary artery disease    • Depression    • Diverticulitis    • Hyperlipidemia    • Hypertension    • Mitral valve prolapse      Past Surgical History:   Procedure Laterality Date   • CARDIAC CATHETERIZATION N/A 1/11/2021    Procedure: Coronary angiography;  Surgeon: Al Garcia MD;  Location: [facility] CATH INVASIVE LOCATION;  Service: Cardiovascular;  Laterality: N/A;   • CARDIAC CATHETERIZATION N/A 1/11/2021    Procedure: Saphenous Vein Graft;  Surgeon: Al Garcia MD;  Location: [facility] CATH INVASIVE LOCATION;  Service: Cardiovascular;  Laterality: N/A;   • HYSTERECTOMY     • KNEE SURGERY       /66 (BP Location: Left arm, Patient Position: Sitting, Cuff Size: Large Adult)   Pulse 59   Ht 160 cm (63\")   Wt 95.3 kg (210 lb)   LMP  (LMP Unknown)   SpO2 96%   Breastfeeding No   BMI 37.20 kg/m²   Family History   Problem Relation Age of Onset   • Heart disease Mother    • Pneumonia Mother    • Asthma Father    • Alzheimer's disease Father    • Heart disease Father        Current Outpatient Medications:   •  amLODIPine (NORVASC) 5 MG tablet, Take 1 tablet by mouth once daily, Disp: 90 tablet, Rfl: 0  •  aspirin 81 MG EC tablet, Take 81 mg by mouth Daily., Disp: , Rfl:   •  atorvastatin (LIPITOR) 40 MG tablet, Take " 40 mg by mouth Every Night., Disp: , Rfl:   •  BREO ELLIPTA 100-25 MCG/INH inhaler, Inhale 1 puff Daily., Disp: , Rfl: 5  •  cetirizine (zyrTEC) 5 MG tablet, Take 10 mg by mouth Daily., Disp: , Rfl:   •  Docusate Sodium (COLACE PO), Take 2 capsules by mouth 2 (two) times a day., Disp: , Rfl:   •  isosorbide dinitrate (ISORDIL) 10 MG tablet, Take 1 tablet by mouth 3 (Three) Times a Day., Disp: 270 tablet, Rfl: 1  •  levothyroxine (Euthyrox) 25 MCG tablet, Take 25 mcg by mouth Daily., Disp: , Rfl:   •  metFORMIN (GLUCOPHAGE) 500 MG tablet, Take 500 mg by mouth 2 (Two) Times a Day., Disp: , Rfl: 1  •  metoprolol succinate XL (TOPROL-XL) 25 MG 24 hr tablet, Take 1 tablet by mouth Daily., Disp: 90 tablet, Rfl: 3  •  montelukast (SINGULAIR) 10 MG tablet, Take 10 mg by mouth Daily., Disp: , Rfl:   •  Omega-3 Fatty Acids (fish oil) 500 MG capsule capsule, Take 500 mg by mouth Daily With Breakfast., Disp: , Rfl:   •  ONETOUCH DELICA LANCETS FINE misc, USE TO CHECK GLUCOSE TWICE DAILY, Disp: , Rfl: 5  •  ONETOUCH VERIO test strip, USE TO CHECK GLUCOSE TWICE DAILY, Disp: , Rfl: 3  •  SPIRIVA RESPIMAT 1.25 MCG/ACT aerosol solution inhaler, Inhale 2 puffs Daily., Disp: , Rfl: 1  •  warfarin (COUMADIN) 3 MG tablet, Take 3 mg by mouth 1 (One) Time Per Week. Tues, Disp: , Rfl:   •  warfarin (COUMADIN) 4 MG tablet, Take one tablet by mouth daily except Tuesday or as directed, Disp: 90 tablet, Rfl: 0  Social History     Socioeconomic History   • Marital status:      Spouse name: Not on file   • Number of children: Not on file   • Years of education: Not on file   • Highest education level: Not on file   Tobacco Use   • Smoking status: Passive Smoke Exposure - Never Smoker   • Smokeless tobacco: Never Used   Substance and Sexual Activity   • Alcohol use: No   • Drug use: No   • Sexual activity: Defer     Allergies   Allergen Reactions   • Codeine GI Intolerance   • Sulfa Antibiotics Hives   • Tylenol With Codeine #3  "[Acetaminophen-Codeine] GI Intolerance   • Vancomycin Other (See Comments)     \"red man syndrome\" - turned red from head to toe   • Naproxen Rash   • Naproxen Sodium Rash     Review of Systems   Constitutional: Negative for fever and malaise/fatigue.   HENT: Negative for congestion and hearing loss.    Eyes: Negative for double vision and visual disturbance.   Cardiovascular: Negative for chest pain, claudication, dyspnea on exertion, leg swelling and syncope.   Respiratory: Negative for cough and shortness of breath.    Endocrine: Negative for cold intolerance.   Skin: Negative for color change and rash.   Musculoskeletal: Negative for arthritis and joint pain.   Gastrointestinal: Negative for abdominal pain and heartburn.   Genitourinary: Negative for hematuria.   Neurological: Negative for excessive daytime sleepiness and dizziness.   Psychiatric/Behavioral: Negative for depression. The patient is not nervous/anxious.    All other systems reviewed and are negative.             Objective:     Physical Exam  Vital stable not in any acute distress  Neck no JVP elevation lungs bilateral and mostly clear heart sounds S1-S2 regular extremities no edema bilateral pulses present equal  Procedures    Lab Review:       Assessment:          Diagnosis Plan   1. Abnormal nuclear stress test     2. Coronary artery disease involving native coronary artery of native heart with unstable angina pectoris (CMS/HCC)     3. Dyslipidemia     4. Essential hypertension     5. Permanent atrial fibrillation (CMS/HCC)     6. Presence of aortocoronary bypass graft            Plan:       MDM  Number of Diagnoses or Management Options  Abnormal nuclear stress test: established, improving  Coronary artery disease involving native coronary artery of native heart with unstable angina pectoris (CMS/HCC): established, improving  Dyslipidemia: established, improving  Essential hypertension: established, improving  Permanent atrial fibrillation " (CMS/[facility]): established, improving  Presence of aortocoronary bypass graft: established, improving     Amount and/or Complexity of Data Reviewed  Clinical lab tests: reviewed  Review and summarize past medical records: yes    Patient Progress  Patient progress: stable

## 2021-04-06 ENCOUNTER — TELEPHONE (OUTPATIENT)
Dept: CARDIOLOGY | Facility: CLINIC | Age: 71
End: 2021-04-06

## 2021-04-08 ENCOUNTER — ANTICOAGULATION VISIT (OUTPATIENT)
Dept: CARDIOLOGY | Facility: CLINIC | Age: 71
End: 2021-04-08

## 2021-04-08 DIAGNOSIS — Z79.01 LONG TERM (CURRENT) USE OF ANTICOAGULANTS: ICD-10-CM

## 2021-04-08 DIAGNOSIS — I48.21 PERMANENT ATRIAL FIBRILLATION (HCC): ICD-10-CM

## 2021-04-08 LAB — INR PPP: 2.5

## 2021-05-07 RX ORDER — AMLODIPINE BESYLATE 5 MG/1
TABLET ORAL
Qty: 90 TABLET | Refills: 0 | Status: SHIPPED | OUTPATIENT
Start: 2021-05-07 | End: 2021-07-30

## 2021-05-17 ENCOUNTER — TELEPHONE (OUTPATIENT)
Dept: CARDIOLOGY | Facility: CLINIC | Age: 71
End: 2021-05-17

## 2021-05-18 ENCOUNTER — ANTICOAGULATION VISIT (OUTPATIENT)
Dept: CARDIOLOGY | Facility: CLINIC | Age: 71
End: 2021-05-18

## 2021-05-18 DIAGNOSIS — I48.21 PERMANENT ATRIAL FIBRILLATION (HCC): Primary | ICD-10-CM

## 2021-05-18 DIAGNOSIS — Z79.01 LONG TERM (CURRENT) USE OF ANTICOAGULANTS: ICD-10-CM

## 2021-05-18 LAB — INR PPP: 2.1

## 2021-06-15 ENCOUNTER — ANTICOAGULATION VISIT (OUTPATIENT)
Dept: CARDIOLOGY | Facility: CLINIC | Age: 71
End: 2021-06-15

## 2021-06-15 DIAGNOSIS — I48.21 PERMANENT ATRIAL FIBRILLATION (HCC): Primary | ICD-10-CM

## 2021-06-15 DIAGNOSIS — Z79.01 LONG TERM (CURRENT) USE OF ANTICOAGULANTS: ICD-10-CM

## 2021-06-15 LAB — INR PPP: 3

## 2021-06-24 ENCOUNTER — OFFICE VISIT (OUTPATIENT)
Dept: CARDIOLOGY | Facility: CLINIC | Age: 71
End: 2021-06-24

## 2021-06-24 VITALS
HEART RATE: 63 BPM | WEIGHT: 203 LBS | OXYGEN SATURATION: 95 % | DIASTOLIC BLOOD PRESSURE: 74 MMHG | HEIGHT: 63 IN | SYSTOLIC BLOOD PRESSURE: 124 MMHG | BODY MASS INDEX: 35.97 KG/M2

## 2021-06-24 DIAGNOSIS — I10 ESSENTIAL HYPERTENSION: ICD-10-CM

## 2021-06-24 DIAGNOSIS — I25.110 CORONARY ARTERY DISEASE INVOLVING NATIVE CORONARY ARTERY OF NATIVE HEART WITH UNSTABLE ANGINA PECTORIS (HCC): Primary | ICD-10-CM

## 2021-06-24 DIAGNOSIS — Z95.1 PRESENCE OF AORTOCORONARY BYPASS GRAFT: ICD-10-CM

## 2021-06-24 DIAGNOSIS — I48.0 PAROXYSMAL ATRIAL FIBRILLATION (HCC): ICD-10-CM

## 2021-06-24 DIAGNOSIS — Z79.01 LONG TERM (CURRENT) USE OF ANTICOAGULANTS: ICD-10-CM

## 2021-06-24 PROCEDURE — 99213 OFFICE O/P EST LOW 20 MIN: CPT | Performed by: INTERNAL MEDICINE

## 2021-06-24 RX ORDER — RANOLAZINE 500 MG/1
500 TABLET, EXTENDED RELEASE ORAL 2 TIMES DAILY
COMMUNITY
End: 2022-01-18 | Stop reason: SDUPTHER

## 2021-06-24 RX ORDER — METHYLPREDNISOLONE 4 MG/1
TABLET ORAL
COMMUNITY
End: 2021-06-25

## 2021-06-24 NOTE — PROGRESS NOTES
Subjective:     Encounter Date:06/24/2021      Patient ID: Mary Deleon is a 70 y.o. female.    Chief Complaint: Abnormal Stress Test  History of Present Illness  70-year-old white female patient with known history of obesity,  hypertension and dyslipidemia,  chronic atrial fibrillation, Comes back for follow-up            patient underwent right and left cardiac catheterization June 2017  Patient found to have proximal LAD 70% mid LAD 70% obtuse marginal branch ostium 80% RCA ostium 80%  Patient underwent lima to LAD vein graft to the PDA and obtuse marginal branch Maze procedure     Patient was started on amiodarone and underwent cardioversion      patient went into sinus rhythm but significant sinus bradycardia so she stopped the amiodarone and digoxin  Due to recurrent AFib patient underwent ablation 2nd time in April 2019   transesophageal echocardiogram was done  April 2019   showed moderate LVH bilateral atrial enlargement normal EF   left atrial appendage is patent       Due to recurrent symptoms underwent stress Myoview December 2020 which showed anteroapical ischemia     December 2020 echocardiogram EF 60% moderate mitral insufficiency mild pulmonary hypertension RV dilatation noted     Patient underwent cardiac catheterization  2 out of 3 grafts were open the vein graft to the marginal branch was occluded LIMA to LAD was open but flow to the diagonal arteries were compromised  Both diagonal arteries have up to 70 to 80% stenosis  Patient may need complicated procedure to stent proximal LAD to help with the diagonal artery flow  The ostial marginal branch has 70% stenosis the vein graft to the marginal branch was occluded mid to distal marginal branch is severely diseased  Vein graft to the RCA is open  So it was decided aggressive medical treatment cardiac risk factor modification  Patient is currently on Isordil 10 mg 3 times daily she did not tolerate the long-acting nitrate  On Toprol-XL now  "25 mg every day  Patient was given Ranexa but she stopped it due to the dizziness  Patient is feeling reasonably well would like to continue medical treatment before proceeding with possible high risk PCI  Recent labs CMP within normal limit      Normal sinus rhythmPatient comes back for follow-up on Isordil doing well without any further symptoms of chest pain    Patient was advised to continue current medications follow-up in 6 months      The following portions of the patient's history were reviewed and updated as appropriate: Allergies current medications past family history past medical history past social history past surgical history problem list and review of systems  Past Medical History:   Diagnosis Date   • Arthritis    • Atrial fibrillation (CMS/HCC)    • COPD (chronic obstructive pulmonary disease) (CMS/HCC)    • Coronary artery disease    • Depression    • Diverticulitis    • Hyperlipidemia    • Hypertension    • Mitral valve prolapse      Past Surgical History:   Procedure Laterality Date   • CARDIAC CATHETERIZATION N/A 1/11/2021    Procedure: Coronary angiography;  Surgeon: Al Garcia MD;  Location:  [facility];  Service: Cardiovascular;  Laterality: N/A;   • CARDIAC CATHETERIZATION N/A 1/11/2021    Procedure: Saphenous Vein Graft;  Surgeon: Al Garcia MD;  Location:  [facility];  Service: Cardiovascular;  Laterality: N/A;   • HYSTERECTOMY     • KNEE SURGERY       /74 (BP Location: Left arm, Patient Position: Sitting, Cuff Size: Adult)   Pulse 63   Ht 160 cm (63\")   Wt 92.1 kg (203 lb)   LMP  (LMP Unknown)   SpO2 95%   BMI 35.96 kg/m²   Family History   Problem Relation Age of Onset   • Heart disease Mother    • Pneumonia Mother    • Asthma Father    • Alzheimer's disease Father    • Heart disease Father        Current Outpatient Medications:   •  amLODIPine (NORVASC) 5 MG tablet, Take 1 tablet by mouth once daily, " Disp: 90 tablet, Rfl: 0  •  aspirin 81 MG EC tablet, Take 81 mg by mouth Daily., Disp: , Rfl:   •  atorvastatin (LIPITOR) 40 MG tablet, Take 40 mg by mouth Every Night., Disp: , Rfl:   •  BREO ELLIPTA 100-25 MCG/INH inhaler, Inhale 1 puff Daily., Disp: , Rfl: 5  •  cetirizine (zyrTEC) 5 MG tablet, Take 10 mg by mouth Daily., Disp: , Rfl:   •  Docusate Sodium (COLACE PO), Take 2 capsules by mouth 2 (two) times a day., Disp: , Rfl:   •  isosorbide dinitrate (ISORDIL) 10 MG tablet, Take 1 tablet by mouth 3 (Three) Times a Day., Disp: 270 tablet, Rfl: 1  •  levothyroxine (Euthyrox) 25 MCG tablet, Take 25 mcg by mouth Daily., Disp: , Rfl:   •  metFORMIN (GLUCOPHAGE) 500 MG tablet, Take 500 mg by mouth 2 (Two) Times a Day., Disp: , Rfl: 1  •  methylPREDNISolone (MEDROL) 4 MG tablet, methylprednisolone 4 mg tablets in a dose pack, Disp: , Rfl:   •  metoprolol succinate XL (TOPROL-XL) 25 MG 24 hr tablet, Take 1 tablet by mouth Daily. (Patient taking differently: Take 12.5 mg by mouth Daily. 1/2 tab twice a day), Disp: 90 tablet, Rfl: 3  •  montelukast (SINGULAIR) 10 MG tablet, Take 10 mg by mouth Daily., Disp: , Rfl:   •  Omega-3 Fatty Acids (fish oil) 500 MG capsule capsule, Take 500 mg by mouth Daily With Breakfast., Disp: , Rfl:   •  ONETOUCH DELICA LANCETS FINE misc, USE TO CHECK GLUCOSE TWICE DAILY, Disp: , Rfl: 5  •  ONETOUCH VERIO test strip, USE TO CHECK GLUCOSE TWICE DAILY, Disp: , Rfl: 3  •  Probiotic Product (PROBIOTIC-10 PO), Take  by mouth., Disp: , Rfl:   •  ranolazine (RANEXA) 500 MG 12 hr tablet, Take 500 mg by mouth 2 (Two) Times a Day., Disp: , Rfl:   •  SPIRIVA RESPIMAT 1.25 MCG/ACT aerosol solution inhaler, Inhale 2 puffs Daily., Disp: , Rfl: 1  •  warfarin (COUMADIN) 3 MG tablet, Take 3 mg by mouth 1 (One) Time Per Week. Tues, Disp: , Rfl:   •  warfarin (COUMADIN) 4 MG tablet, Take one tablet by mouth daily except Tuesday or as directed, Disp: 90 tablet, Rfl: 0  Social History     Socioeconomic History  "  • Marital status:      Spouse name: Not on file   • Number of children: Not on file   • Years of education: Not on file   • Highest education level: Not on file   Tobacco Use   • Smoking status: Passive Smoke Exposure - Never Smoker   • Smokeless tobacco: Never Used   Vaping Use   • Vaping Use: Never used   Substance and Sexual Activity   • Alcohol use: No   • Drug use: No   • Sexual activity: Defer     Allergies   Allergen Reactions   • Omeprazole Hives   • Codeine GI Intolerance   • Sulfa Antibiotics Hives   • Tylenol With Codeine #3 [Acetaminophen-Codeine] GI Intolerance   • Vancomycin Other (See Comments)     \"red man syndrome\" - turned red from head to toe   • Naproxen Rash   • Naproxen Sodium Rash     Review of Systems   Constitutional: Negative for fever and malaise/fatigue.   HENT: Negative for congestion and hearing loss.    Eyes: Negative for double vision and visual disturbance.   Cardiovascular: Negative for chest pain, claudication, dyspnea on exertion, leg swelling and syncope.   Respiratory: Negative for cough and shortness of breath.    Endocrine: Negative for cold intolerance.   Skin: Negative for color change and rash.   Musculoskeletal: Negative for arthritis and joint pain.   Gastrointestinal: Negative for abdominal pain and heartburn.   Genitourinary: Negative for hematuria.   Neurological: Positive for dizziness and numbness (right hand due to pinched nerve). Negative for excessive daytime sleepiness.   Psychiatric/Behavioral: Negative for depression. The patient is not nervous/anxious.    All other systems reviewed and are negative.             Objective:     Physical Exam  Patient vitals reviewed alert not in any acute distress neck no JVP elevation lungs bilateral entry mostly clear heart sounds S1-S2 regular extremities no edema pulses present bilateral equal  Procedures    Lab Review:       Assessment:          Diagnosis Plan   1. Coronary artery disease involving native coronary " artery of native heart with unstable angina pectoris (CMS/HCC)     2. Paroxysmal atrial fibrillation (CMS/HCC)     3. Presence of aortocoronary bypass graft     4. Long term (current) use of anticoagulants     5. Essential hypertension            Plan:       MDM  Number of Diagnoses or Management Options  Coronary artery disease involving native coronary artery of native heart with unstable angina pectoris (CMS/HCC): established, improving  Essential hypertension: established, improving  Long term (current) use of anticoagulants: established, improving  Paroxysmal atrial fibrillation (CMS/HCC): established, improving  Presence of aortocoronary bypass graft: established, improving     Amount and/or Complexity of Data Reviewed  Clinical lab tests: ordered and reviewed  Review and summarize past medical records: yes    Patient Progress  Patient progress: stable

## 2021-06-25 RX ORDER — ATORVASTATIN CALCIUM 40 MG/1
TABLET, FILM COATED ORAL
Qty: 90 TABLET | Refills: 0 | Status: SHIPPED | OUTPATIENT
Start: 2021-06-25 | End: 2021-09-17

## 2021-07-01 ENCOUNTER — TELEPHONE (OUTPATIENT)
Dept: CARDIOLOGY | Facility: CLINIC | Age: 71
End: 2021-07-01

## 2021-07-01 NOTE — TELEPHONE ENCOUNTER
"Called patient, reminder to have INR drawn. Patient states she had INR drawn at Dr Issa\"s office . [phone].     Spoke to Dr Issa's office, PT 32.9 and INR 3.0  "

## 2021-07-12 ENCOUNTER — TREATMENT (OUTPATIENT)
Dept: PHYSICAL THERAPY | Facility: CLINIC | Age: 71
End: 2021-07-12

## 2021-07-12 DIAGNOSIS — M43.6 NECK STIFFNESS: ICD-10-CM

## 2021-07-12 DIAGNOSIS — M54.2 CERVICALGIA: Primary | ICD-10-CM

## 2021-07-12 PROCEDURE — 97110 THERAPEUTIC EXERCISES: CPT | Performed by: PHYSICAL THERAPIST

## 2021-07-12 PROCEDURE — 97140 MANUAL THERAPY 1/> REGIONS: CPT | Performed by: PHYSICAL THERAPIST

## 2021-07-12 PROCEDURE — 97162 PT EVAL MOD COMPLEX 30 MIN: CPT | Performed by: PHYSICAL THERAPIST

## 2021-07-12 RX ORDER — ISOSORBIDE DINITRATE 10 MG/1
TABLET ORAL
Qty: 270 TABLET | Refills: 3 | Status: SHIPPED | OUTPATIENT
Start: 2021-07-12 | End: 2022-01-18 | Stop reason: SDUPTHER

## 2021-07-12 NOTE — PROGRESS NOTES
Physical Therapy Initial Evaluation and Plan of Care      Patient: Mary Deleon   : 1950  Diagnosis/ICD-10 Code:  Cervicalgia [M54.2]  Referring practitioner: Yuliana Issa MD  Date of Initial Visit: 2021  Today's Date: 2021  Patient seen for 1 sessions           Subjective Evaluation    History of Present Illness  Date of onset: 2021  Mechanism of injury: Pt with ongoing L sided neck pain after falling asleep in her lift chair recliner about 2 months ago. Head was bent to the R for a few hours and left her with extreme left sided neck pain. Consulted with MD and did some better with meds. Imaging 6-3-21 states mild degen anterior lesthesis of C3 on C4 and C4 to C5. Advanced disc space narrowing at C5-7. Osteophytes at C4-7.   Did have numbness in R vs. L hand for a couple weeks.  Significant med hx of CAGB x 3, 4 years ago and A-fib now. Better with ablations but still has to see cardiologist every 3 months. NIDDM under control. Now on disability but would rather be working. Also with COPD with SOA. Non specific Auto immune disease with hx of cellulitis R UE. Prior LBP with JATIN which helped. Used to ride horses but never thrown. No prior MVAs. Did fall onto her L side at age 29 with sprain strain to L neck and shoulder and possible injury to L clavicle. Required therapy but left with significant stiffness and weakness but somehow able to keep working. Also with significant IP deformity in her hands with dx of RA.     Subjective comment: left sided neck pain  Patient Occupation: retired on disability Quality of life: good    Pain  Current pain ratin  At best pain ratin  At worst pain ratin  Location: L neck  Quality: sharp and tight  Relieving factors: change in position, medications, relaxation, ice and heat  Aggravating factors: prolonged positioning  Progression: improved    Social Support  Lives with: alone    Hand dominance: right    Diagnostic Tests  X-ray: abnormal  (unknown)    Treatments  Previous treatment: medication  Patient Goals  Patient goals for therapy: decreased pain, increased strength and increased motion  Patient goal: better ROM for safer driving           Objective          Static Posture     Head  Forward.    Shoulders  Rounded.    Thoracic Spine  Hyperkyphosis.    Lumbar Spine   Increased lordosis.     Palpation   Left   Hypertonic in the scalenes, suboccipitals and upper trapezius.   Muscle spasm in the suboccipitals.   Tenderness of the suboccipitals.     Neurological Testing     Sensation   Cervical/Thoracic   Left   Intact: light touch    Right   Intact: light touch    Reflexes   Left   Biceps (C5/C6): normal (2+)  Brachioradialis (C6): normal (2+)  Triceps (C7): normal (2+)    Right   Biceps (C5/C6): trace (1+)  Brachioradialis (C6): trace (1+)  Triceps (C7): trace (1+)    Active Range of Motion   Cervical/Thoracic Spine   Cervical    Flexion: 30 degrees with pain  Extension: 10 degrees with pain  Left lateral flexion: 10 degrees with pain  Right lateral flexion: 20 degrees with pain  Left rotation: 40 degrees with pain  Right rotation: 45 degrees with pain  Left Shoulder   Flexion: 90 degrees with pain  Abduction: 70 degrees with pain    Right Shoulder   Normal active range of motion    Strength/Myotome Testing     Left Shoulder     Planes of Motion   Flexion: 2+   Abduction: 2+     Right Shoulder   Normal muscle strength    Tests   Cervical     Left   Positive active compression (Pingree).     Right   Positive active compression (Pingree).     Left Shoulder   Positive drop arm.         See Exercise, Manual, and Modality Logs for complete treatment.     Functional outcome score: NDI 24%      Assessment & Plan     Assessment  Impairments: abnormal or restricted ROM, activity intolerance, impaired physical strength, lacks appropriate home exercise program, pain with function and safety issue  Assessment details: Pt with stable condition with slight  improvement since original injury, but still fearful of her safety when driving due to limited ROM. Long hx of L UE injury with probably cuff tear due to poor AROM and positive drop arm test. Pt with co-morbidities of cardiac issues most likely would prevent cuff repair surgery. Also personal factor of living alone thus would have trouble with rehab and recovery if not able to use her L UE. Pt with moderate spinal tension and spasm correlating with DDD. Will do more supine manual therapy distraction next rx and consider mechanical traction.   Prognosis: fair  Functional Limitations: carrying objects, lifting, pulling, pushing, uncomfortable because of pain and reaching overhead  Goals  Plan Goals: STGs 4 weeks:  1. Increase cervical ROM 5-10 degrees in each plane to help with ADLs and driving  2. Decrease spasm L neck to decrease max pain to < 2/10  3. Pt to be indep in variety of easy home ex  4. NDI to improve from 24 to < 18%  LTGs 8 weeks:  1. Improve neck ROM by 15 degrees to state ease of driving and increased confidence in safety  2. Resolve spasm L neck  3. Pt indep in ex to keep L UE from freezing  4. NDI < 14% by dc    Plan  Therapy options: will be seen for skilled physical therapy services  Planned modality interventions: cryotherapy, ultrasound, traction and electrical stimulation/Russian stimulation  Planned therapy interventions: manual therapy, soft tissue mobilization, spinal/joint mobilization, strengthening, stretching, therapeutic activities, home exercise program and functional ROM exercises  Frequency: 2x week  Duration in visits: 20  Duration in weeks: 12  Treatment plan discussed with: patient        Timed:  Manual Therapy:    10     mins  66874;  Therapeutic Exercise:    15     mins  86395;     Neuromuscular Selvin:        mins  93988;    Therapeutic Activity:          mins  58029;     Gait Training:           mins  87887;     Ultrasound:          mins  30259;    Electrical Stimulation:          mins  01518 ( );  Iontophoresis         mins 66235;  Orthotic Mgmt/training       mins 05914;  Orthotic check out       mins 88160;  Canalith Repositioning       mins 87226;    Untimed:  Electrical Stimulation:         mins  30562 ( );  Mechanical Traction:         mins  11957;     Timed Treatment:  25    mins   Total Treatment:     55   mins    PT SIGNATURE: Elysia Wang Kimura, PT   DATE TREATMENT INITIATED: 7/12/2021    Initial Certification  Certification Period: 10/10/2021  I certify that the therapy services are furnished while this patient is under my care.  The services outlined above are required by this patient, and will be reviewed every 90 days.     PHYSICIAN: Yuliana Issa MD      DATE:     Please sign and return via fax to [phone] vs. [phone].. Thank you, [facility].

## 2021-07-13 ENCOUNTER — ANTICOAGULATION VISIT (OUTPATIENT)
Dept: CARDIOLOGY | Facility: CLINIC | Age: 71
End: 2021-07-13

## 2021-07-13 DIAGNOSIS — I48.21 PERMANENT ATRIAL FIBRILLATION (HCC): Primary | ICD-10-CM

## 2021-07-13 DIAGNOSIS — Z79.01 LONG TERM (CURRENT) USE OF ANTICOAGULANTS: ICD-10-CM

## 2021-07-13 LAB — INR PPP: 2.2

## 2021-07-14 ENCOUNTER — TREATMENT (OUTPATIENT)
Dept: PHYSICAL THERAPY | Facility: CLINIC | Age: 71
End: 2021-07-14

## 2021-07-14 DIAGNOSIS — M54.2 CERVICALGIA: Primary | ICD-10-CM

## 2021-07-14 DIAGNOSIS — M43.6 NECK STIFFNESS: ICD-10-CM

## 2021-07-14 PROCEDURE — 97140 MANUAL THERAPY 1/> REGIONS: CPT | Performed by: PHYSICAL THERAPIST

## 2021-07-14 PROCEDURE — 97110 THERAPEUTIC EXERCISES: CPT | Performed by: PHYSICAL THERAPIST

## 2021-07-14 PROCEDURE — 97035 APP MDLTY 1+ULTRASOUND EA 15: CPT | Performed by: PHYSICAL THERAPIST

## 2021-07-14 NOTE — PROGRESS NOTES
Physical Therapy Daily Progress Note    Patient: Mary Deleon   : 1950  Diagnosis/ICD-10 Code:  Cervicalgia [M54.2]  Referring practitioner: Yuliana Issa MD  Date of Initial Visit: Type: THERAPY  Noted: 2021  Today's Date: 2021  Patient seen for 2 sessions           Subjective Evaluation    History of Present Illness    Subjective comment: did okay after last session and feels like she has more ROM but headaches are persisting       Objective   See Exercise, Manual, and Modality Logs for complete treatment.       Assessment & Plan     Assessment  Assessment details: Did well today with combination of manual traction, STM, light ex, UBE, and US.     Continue to try and increase L rotator cuff as possible to take the stress of her L trap. Large mass L anterior shoulder pointed out to this PT by pt. Apparently muscle hypertrophy of ant deltoid and subscap making up for tear of supraspinatus 30 years ago?                   Timed:    Manual Therapy:    15     mins  21498;  Therapeutic Exercise:    15     mins  60949;     Neuromuscular Selvin:        mins  08515;    Therapeutic Activity:          mins  56744;     Gait Training:           mins  98903;     Ultrasound:     8     mins  15839;    Electrical Stimulation:         mins  03917 ( );  Iontophoresis         mins 69642;  Aquatic Therapy         mins 64499;  Dry Needling                   mins    Untimed:  Electrical Stimulation:         mins  28287 ( );  Mechanical Traction:         mins  64411;     Timed Treatment:   38   mins   Total Treatment:     45   mins  Zorre Zeno Kimura, PT  Physical Therapist

## 2021-07-19 ENCOUNTER — TREATMENT (OUTPATIENT)
Dept: PHYSICAL THERAPY | Facility: CLINIC | Age: 71
End: 2021-07-19

## 2021-07-19 DIAGNOSIS — M43.6 NECK STIFFNESS: ICD-10-CM

## 2021-07-19 DIAGNOSIS — M54.2 CERVICALGIA: Primary | ICD-10-CM

## 2021-07-19 PROCEDURE — 97140 MANUAL THERAPY 1/> REGIONS: CPT | Performed by: PHYSICAL THERAPIST

## 2021-07-19 PROCEDURE — 97035 APP MDLTY 1+ULTRASOUND EA 15: CPT | Performed by: PHYSICAL THERAPIST

## 2021-07-19 PROCEDURE — 97110 THERAPEUTIC EXERCISES: CPT | Performed by: PHYSICAL THERAPIST

## 2021-07-19 NOTE — PROGRESS NOTES
Physical Therapy Daily Progress Note      Patient: Mary Deleon   : 1950  Diagnosis/ICD-10 Code:  Cervicalgia [M54.2]   Problems Addressed this Visit     None      Visit Diagnoses     Cervicalgia    -  Primary    Neck stiffness          Diagnoses       Codes Comments    Cervicalgia    -  Primary ICD-10-CM: M54.2  ICD-9-CM: 723.1     Neck stiffness     ICD-10-CM: M43.6  ICD-9-CM: 723.5          Referring practitioner: Yuliana Issa MD  Date of Initial Visit: Type: THERAPY  Noted: 2021  Today's Date: 2021    VISIT#: 3    Subjective Patient reports noticing decreased frequency of HA and a little improvement with neck mobility.       Objective continued L UT tightness noted     See Exercise, Manual, and Modality Logs for complete treatment.     Assessment/Plan Patient continues to respond well to manual interventions and able to tolerate performed therapeutic exercise with no reports of increased symptoms. Patient will continue to benefit from improved base scapular strengthening for improved postural awareness and stability.     Progress per Plan of Care         Timed:         Manual Therapy:    15     mins  10907;     Therapeutic Exercise:    15     mins  52035;     Neuromuscular Selvin:        mins  61150;    Therapeutic Activity:          mins  56096;     Gait Training:           mins  43914;     Ultrasound:     10     mins  72804;    Ionto                                  mins   34230  Canalith Repos                   mins  19008    Un-Timed:  Electrical Stimulation:         mins  17846 ( );  Dry Needling          mins self-pay  Traction          mins 98319    Timed Treatment:   40   mins   Total Treatment:     40   mins    Carlos Carey PTA  Physical Therapist

## 2021-07-22 ENCOUNTER — TREATMENT (OUTPATIENT)
Dept: PHYSICAL THERAPY | Facility: CLINIC | Age: 71
End: 2021-07-22

## 2021-07-22 DIAGNOSIS — M54.2 CERVICALGIA: Primary | ICD-10-CM

## 2021-07-22 DIAGNOSIS — M43.6 NECK STIFFNESS: ICD-10-CM

## 2021-07-22 PROCEDURE — 97035 APP MDLTY 1+ULTRASOUND EA 15: CPT | Performed by: PHYSICAL THERAPIST

## 2021-07-22 PROCEDURE — 97140 MANUAL THERAPY 1/> REGIONS: CPT | Performed by: PHYSICAL THERAPIST

## 2021-07-22 PROCEDURE — 97110 THERAPEUTIC EXERCISES: CPT | Performed by: PHYSICAL THERAPIST

## 2021-07-22 NOTE — PROGRESS NOTES
Physical Therapy Daily Progress Note      Patient: Mary Deleon   : 1950  Diagnosis/ICD-10 Code:  Cervicalgia [M54.2]  Referring practitioner: Yuliana Issa MD  Date of Initial Visit: Type: THERAPY  Noted: 2021  Today's Date: 2021  Patient seen for 4 sessions         Mary Deleon reports: her L arm is still significantly weaker and less mobile than her R and headaches are still here persistent however even all that being said she reports improvement just slow progress being noted.    Objective   See Exercise, Manual, and Modality Logs for complete treatment.     Assessment/Plan  Pt. Responds well to gentle manual intervention and shows improving cervical rotation as stretches are performed. Pt. Shows good tolerance to current activities but does have significant fatigue following each one. Pt. Is encouraged to perform HEP to begin building up tolerance to exercise.    Progress per Plan of Care           Timed:         Manual Therapy:    15     mins  77411;     Therapeutic Exercise:    15     mins  72057;     Ultrasound:     10     mins  88042;        Timed Treatment:   40   mins   Total Treatment:     40   mins    Lary Lujan PTA  Physical Therapist Assistant License #[id number]

## 2021-07-26 ENCOUNTER — TREATMENT (OUTPATIENT)
Dept: PHYSICAL THERAPY | Facility: CLINIC | Age: 71
End: 2021-07-26

## 2021-07-26 DIAGNOSIS — M43.6 NECK STIFFNESS: ICD-10-CM

## 2021-07-26 DIAGNOSIS — M54.2 CERVICALGIA: Primary | ICD-10-CM

## 2021-07-26 PROCEDURE — 97035 APP MDLTY 1+ULTRASOUND EA 15: CPT | Performed by: PHYSICAL THERAPIST

## 2021-07-26 PROCEDURE — 97110 THERAPEUTIC EXERCISES: CPT | Performed by: PHYSICAL THERAPIST

## 2021-07-26 PROCEDURE — 97140 MANUAL THERAPY 1/> REGIONS: CPT | Performed by: PHYSICAL THERAPIST

## 2021-07-26 NOTE — PROGRESS NOTES
Physical Therapy Daily Progress Note      Patient: Mary Deleon   : 1950  Diagnosis/ICD-10 Code:  Cervicalgia [M54.2]  Referring practitioner: Yuliana Issa MD  Date of Initial Visit: Type: THERAPY  Noted: 2021  Today's Date: 2021  Patient seen for 5 sessions         Mary Deleon reports: she continues to have issues into L arm with slow progression but still improving. Pt. Reports she continues to feel better after ultrasound stating it always feels a little better after ultrasound application.    Objective   See Exercise, Manual, and Modality Logs for complete treatment.     Assessment/Plan  Pt. Continues to tolerate treatment well this date crepitus was noted in L shoulder with PROM and with flexion overhead this date. Pt. States it is uncomfortable at times but not painful when it makes the crepitus like sounds.     Progress strengthening /stabilization /functional activity           Timed:         Manual Therapy:    15     mins  33686;     Therapeutic Exercise:    15     mins  09435;      Ultrasound:    10      mins  03719;        Timed Treatment:   30   mins   Total Treatment:     40   mins    Lary Lujan PTA  Physical Therapist Assistant License #[id number]

## 2021-07-28 ENCOUNTER — TREATMENT (OUTPATIENT)
Dept: PHYSICAL THERAPY | Facility: CLINIC | Age: 71
End: 2021-07-28

## 2021-07-28 DIAGNOSIS — M54.2 CERVICALGIA: Primary | ICD-10-CM

## 2021-07-28 DIAGNOSIS — M43.6 NECK STIFFNESS: ICD-10-CM

## 2021-07-28 PROCEDURE — 97110 THERAPEUTIC EXERCISES: CPT | Performed by: PHYSICAL THERAPIST

## 2021-07-28 PROCEDURE — 97035 APP MDLTY 1+ULTRASOUND EA 15: CPT | Performed by: PHYSICAL THERAPIST

## 2021-07-28 PROCEDURE — 97140 MANUAL THERAPY 1/> REGIONS: CPT | Performed by: PHYSICAL THERAPIST

## 2021-07-28 NOTE — PROGRESS NOTES
Physical Therapy Daily Progress Note      Patient: Mary Deleon   : 1950  Diagnosis/ICD-10 Code:  Cervicalgia [M54.2]  Referring practitioner: Yuliana Issa MD  Date of Initial Visit: Type: THERAPY  Noted: 2021  Today's Date: 2021  Patient seen for 6 sessions         Mary Deleon reports: she continues to have neck pain despite doing her HEP however she is noticing improvement in UE mobility and strength back slowly.    Objective   See Exercise, Manual, and Modality Logs for complete treatment.     Assessment/Plan   Pt. completes exercises today without pain and does well in repsonse to manual intervention and ultrasound for relief of tension. Pt. Was given wall slides this date and was able to complete without pain and was happy to have an exercise for her UE that helps with overhead reaching.    Progress per Plan of Care           Timed:         Manual Therapy:    15     mins  72481;     Therapeutic Exercise:    15     mins  25284;       Ultrasound:    10      mins  00643;        Timed Treatment:   40   mins   Total Treatment:     40   mins    Lary Lujan PTA  Physical Therapist Assistant License #[id number]

## 2021-07-30 RX ORDER — AMLODIPINE BESYLATE 5 MG/1
TABLET ORAL
Qty: 90 TABLET | Refills: 2 | Status: SHIPPED | OUTPATIENT
Start: 2021-07-30 | End: 2022-01-18 | Stop reason: SDUPTHER

## 2021-08-02 ENCOUNTER — TREATMENT (OUTPATIENT)
Dept: PHYSICAL THERAPY | Facility: CLINIC | Age: 71
End: 2021-08-02

## 2021-08-02 DIAGNOSIS — M43.6 NECK STIFFNESS: ICD-10-CM

## 2021-08-02 DIAGNOSIS — M54.2 CERVICALGIA: Primary | ICD-10-CM

## 2021-08-02 PROCEDURE — 97035 APP MDLTY 1+ULTRASOUND EA 15: CPT | Performed by: PHYSICAL THERAPIST

## 2021-08-02 PROCEDURE — 97140 MANUAL THERAPY 1/> REGIONS: CPT | Performed by: PHYSICAL THERAPIST

## 2021-08-02 PROCEDURE — 97110 THERAPEUTIC EXERCISES: CPT | Performed by: PHYSICAL THERAPIST

## 2021-08-02 NOTE — PROGRESS NOTES
Physical Therapy Daily Progress Note      Patient: Mary Deleon   : 1950  Diagnosis/ICD-10 Code:  Cervicalgia [M54.2]   Problems Addressed this Visit     None      Visit Diagnoses     Cervicalgia    -  Primary    Neck stiffness          Diagnoses       Codes Comments    Cervicalgia    -  Primary ICD-10-CM: M54.2  ICD-9-CM: 723.1     Neck stiffness     ICD-10-CM: M43.6  ICD-9-CM: 723.5          Referring practitioner: Yuliana Issa MD  Date of Initial Visit: Type: THERAPY  Noted: 2021  Today's Date: 2021    VISIT#: 7    Subjective Patient reports waking up with neck feeling stiff this morning, has noticed decreased pain in neck since beginning PT, but continued stiffness.       Objective began with MHP  B UT, followed by manual interventions seated, followed by US, finishing with therapeutic exercise.     See Exercise, Manual, and Modality Logs for complete treatment.     Assessment/Plan Patient responded well to manual interventions with decreased stiffness reported. Patient will continue to benefit from improved cervical mobility and decreased UT guarding limiting cervical mobility.     Progress per Plan of Care         Timed:         Manual Therapy:    15     mins  77722;     Therapeutic Exercise:    15     mins  72750;     Neuromuscular Selvin:        mins  91959;    Therapeutic Activity:          mins  51575;     Gait Training:           mins  20843;     Ultrasound:      10    mins  53538;    Ionto                                   mins   99720  Canalith Repos                   mins  64217    Un-Timed:  Electrical Stimulation:         mins  39462 ( );  Dry Needling          mins self-pay  Traction          mins 99861    Timed Treatment:   40   mins   Total Treatment:     50   mins    Carlos Carey PTA  Physical Therapist

## 2021-08-04 ENCOUNTER — TREATMENT (OUTPATIENT)
Dept: PHYSICAL THERAPY | Facility: CLINIC | Age: 71
End: 2021-08-04

## 2021-08-04 DIAGNOSIS — M54.2 CERVICALGIA: Primary | ICD-10-CM

## 2021-08-04 DIAGNOSIS — M43.6 NECK STIFFNESS: ICD-10-CM

## 2021-08-04 PROCEDURE — 97110 THERAPEUTIC EXERCISES: CPT | Performed by: PHYSICAL THERAPIST

## 2021-08-04 PROCEDURE — 97140 MANUAL THERAPY 1/> REGIONS: CPT | Performed by: PHYSICAL THERAPIST

## 2021-08-04 PROCEDURE — 97035 APP MDLTY 1+ULTRASOUND EA 15: CPT | Performed by: PHYSICAL THERAPIST

## 2021-08-04 NOTE — PROGRESS NOTES
Physical Therapy Daily Progress Note      Patient: Mary Deleon   : 1950  Diagnosis/ICD-10 Code:  Cervicalgia [M54.2]  Referring practitioner: Yuliana Issa MD  Date of Initial Visit: Type: THERAPY  Noted: 2021  Today's Date: 2021  Patient seen for 8 sessions         Mary Deleon reports: she continues to see signs of improvement wit her L UE mobility and has improvement with her overall pain at this time but state she needs continued improvement to do the things at home she wants to do.    Objective   See Exercise, Manual, and Modality Logs for complete treatment.     Assessment/Plan   Pt. Shows improved tolerance to manual intervention and exercise at this time Pt. Had no pain with increased resistance. Pt. Completes activities this date with fatigue but no pain reported throughout. Pt. Ended with Ultraousnd this visit and reports relief of stiffness and soreness.    Progress per Plan of Care           Timed:         Manual Therapy:    15     mins  06326;     Therapeutic Exercise:    15     mins  16498;      Ultrasound:     10     mins  57985;      Timed Treatment:  40    mins   Total Treatment:     40   mins    Lary Lujan PTA  Physical Therapist Assistant License #[id number]

## 2021-08-17 ENCOUNTER — TREATMENT (OUTPATIENT)
Dept: PHYSICAL THERAPY | Facility: CLINIC | Age: 71
End: 2021-08-17

## 2021-08-17 DIAGNOSIS — M54.2 CERVICALGIA: Primary | ICD-10-CM

## 2021-08-17 DIAGNOSIS — M43.6 NECK STIFFNESS: ICD-10-CM

## 2021-08-17 PROCEDURE — 97110 THERAPEUTIC EXERCISES: CPT | Performed by: PHYSICAL THERAPIST

## 2021-08-17 PROCEDURE — 97035 APP MDLTY 1+ULTRASOUND EA 15: CPT | Performed by: PHYSICAL THERAPIST

## 2021-08-17 PROCEDURE — 97140 MANUAL THERAPY 1/> REGIONS: CPT | Performed by: PHYSICAL THERAPIST

## 2021-08-17 NOTE — PROGRESS NOTES
Physical Therapy Daily Progress Note      Patient: Mary Deleon   : 1950  Diagnosis/ICD-10 Code:  Cervicalgia [M54.2]  Referring practitioner: Yuliana Issa MD  Date of Initial Visit: Type: THERAPY  Noted: 2021  Today's Date: 2021  Patient seen for 9 sessions         Mary Deleon reports: she has had a flare up int he neck recently but overall has noticed great improvement in pain and UE function with PT and feels continued treatment would help her become more independent.    NDI 34%    Objective   See Exercise, Manual, and Modality Logs for complete treatment.     Assessment/Plan   Pt. Completes exercises well and has positive response of relieved tension following manual intervention and ultrasound this date. Pt. Would continue to benefit from progressed strengthening and cervcial mobility for ADL's.    Goals  Plan Goals: STGs 4 weeks:  1. Increase cervical ROM 5-10 degrees in each plane to help with ADLs and driving MET  2. Decrease spasm L neck to decrease max pain to < 2/10 Not MET  3. Pt to be indep in variety of easy home ex MET  4. NDI to improve from 24 to < 18% Not MET  LTGs 8 weeks:  1. Improve neck ROM by 15 degrees to state ease of driving and increased confidence in safety Progressing  2. Resolve spasm L neck Not MET  3. Pt indep in ex to keep L UE from freezing MET  4. NDI < 14% by dc Not MET    Progress strengthening /stabilization /functional activity           Timed:         Manual Therapy:    15     mins  62203;     Therapeutic Exercise:    15     mins  12585;       Ultrasound:     8     mins  74561;      Timed Treatment:   38   mins   Total Treatment:     38   mins    Lary Lujan PTA  Physical Therapist Assistant License #[id number]

## 2021-08-23 ENCOUNTER — OFFICE VISIT (OUTPATIENT)
Dept: CARDIOLOGY | Facility: CLINIC | Age: 71
End: 2021-08-23

## 2021-08-23 ENCOUNTER — TELEPHONE (OUTPATIENT)
Dept: CARDIOLOGY | Facility: CLINIC | Age: 71
End: 2021-08-23

## 2021-08-23 VITALS
SYSTOLIC BLOOD PRESSURE: 135 MMHG | BODY MASS INDEX: 36.49 KG/M2 | DIASTOLIC BLOOD PRESSURE: 75 MMHG | HEART RATE: 69 BPM | WEIGHT: 206 LBS | OXYGEN SATURATION: 96 %

## 2021-08-23 DIAGNOSIS — I25.10 CORONARY ARTERY DISEASE INVOLVING NATIVE CORONARY ARTERY OF NATIVE HEART WITHOUT ANGINA PECTORIS: ICD-10-CM

## 2021-08-23 DIAGNOSIS — I25.110 CORONARY ARTERY DISEASE INVOLVING NATIVE CORONARY ARTERY OF NATIVE HEART WITH UNSTABLE ANGINA PECTORIS (HCC): ICD-10-CM

## 2021-08-23 DIAGNOSIS — Z95.1 PRESENCE OF AORTOCORONARY BYPASS GRAFT: ICD-10-CM

## 2021-08-23 DIAGNOSIS — I10 ESSENTIAL HYPERTENSION: ICD-10-CM

## 2021-08-23 DIAGNOSIS — I48.0 PAROXYSMAL ATRIAL FIBRILLATION (HCC): Primary | ICD-10-CM

## 2021-08-23 PROCEDURE — 93000 ELECTROCARDIOGRAM COMPLETE: CPT | Performed by: INTERNAL MEDICINE

## 2021-08-23 PROCEDURE — 99214 OFFICE O/P EST MOD 30 MIN: CPT | Performed by: INTERNAL MEDICINE

## 2021-08-23 NOTE — PROGRESS NOTES
CC-Atrial fibrillation, sleep apnea         Sub--70-year-old female patient underwent prior cardioversion and has prior bradycardia post cardioversion and patient was on digoxin and amiodarone which have been stopped and left on low-dose of beta-blockers and started having recurrent arrhythmias .Patient has known coronary artery disease with prior bypass surgery done in 2017 with concomitant Maze procedure.   patient was on amiodarone and beta-blockers and digoxin and patient was left on low-dose of beta-blockers with recurrence of arrhythmias And patient was found to have severe biatrial enlargement and resistant right atrial flutter and underwent atrial flutter ablation which was a counter-clockwise right-sided flutter and after that patient had recurrent atrial fibrillation and underwent cryoablation  months ago and feels remarkably well since ablation and comes in for follow-up   She is using her CPAP machine  She has also has rheumatoid arthritis without any recent flareup  She feels remarkably well  She is very compliant with her medications  Had cardiac cath--which revealed    SUMMARY: 2 out of 3 grafts were open  The vein graft to the marginal branch was occluded  LIMA to LAD was open but flow to the diagonal arteries were compromised          Past Medical History:     Reviewed history from 10/18/2018 and no changes required:        Hypertension        Dyslipidemia        Coronary Artery Disease (05/25/2017)        C O P D        Chronic-Atrial Fibrillation        S/P CABG x3 Vessels        Atrial Flutter     Past Surgical History:     Reviewed history from 05/30/2019 and no changes required:        Cardiac Cath 2003  with narrowing distal LAD and prox Cx.         Hyster and BSO        Torn miniscus on left knee x2        Heart Catherization (05/24/2017)        C A B G: x3 Vessels, LIMA to LAD, SVG to PDA & SVG to OM3 (05/25/2017)        Cardioversion 9/24/18         Ablation 1/2/19 Dr. MAST                 Physical Exam     General:      well developed, well nourished, in no acute distress.    Head:      normocephalic and atraumatic.    Eyes:      PERRL/EOM intact, conjunctiva and sclera clear with out nystagmus.    Neck:      no masses, thyromegaly, TRACHEA CENTRAL WITH NORMAL RESPIRATORY EFFORT  Lungs:      clear bilaterally to auscultation.    Heart:      regular rate and rhythm, S1, S2 without murmurs, rubs, or gallops                assessment plan   recurrent atrial arrhythmias in the form of atrial flutter / atrial fibrillation with prior Maze procedure-- post right atrial flutter ablation with recurrence of atrial fibrillation-- post cryoablation with resolution of symptoms  Treated sleep apnea   coronary artery disease with prior bypass surgery--stable on imdur--cath report reviewed--on ranexa   hypertension   diabetes      follow-up 6 months   medications reviewed   kindly note patient's left atrial appendage is patent  Asymptomatic PVCs  Advantages of Covid vaccination educated        ECG 12 Lead    Date/Time: 8/23/2021 11:34 AM  Performed by: Louie Rios MD  Authorized by: Louie Rios MD   Comparison: compared with previous ECG   Similar to previous ECG  Rate: normal  Conduction: conduction normal  QRS axis: normal            Electronically signed by Louie Rios MD, 08/23/21, 11:34 AM EDT.

## 2021-08-23 NOTE — TELEPHONE ENCOUNTER
SHOULD SHE GET COVID VACCINE? I DID ADVISE HER TO CALL PCP. SHE SAID SHE DID, AND THEY TOLD HER TO CALL DR. BLACKBURN

## 2021-08-23 NOTE — TELEPHONE ENCOUNTER
Called pt and stated that she can get the vaccine if ok by PCP. She will follow up with PCP regarding.

## 2021-08-24 ENCOUNTER — TREATMENT (OUTPATIENT)
Dept: PHYSICAL THERAPY | Facility: CLINIC | Age: 71
End: 2021-08-24

## 2021-08-24 DIAGNOSIS — M54.2 CERVICALGIA: Primary | ICD-10-CM

## 2021-08-24 DIAGNOSIS — M43.6 NECK STIFFNESS: ICD-10-CM

## 2021-08-24 PROCEDURE — 97035 APP MDLTY 1+ULTRASOUND EA 15: CPT | Performed by: PHYSICAL THERAPIST

## 2021-08-24 PROCEDURE — 97140 MANUAL THERAPY 1/> REGIONS: CPT | Performed by: PHYSICAL THERAPIST

## 2021-08-24 PROCEDURE — 97110 THERAPEUTIC EXERCISES: CPT | Performed by: PHYSICAL THERAPIST

## 2021-08-24 NOTE — PROGRESS NOTES
Physical Therapy Daily Progress Note      Patient: Mary Deleon   : 1950  Diagnosis/ICD-10 Code:  Cervicalgia [M54.2]  Referring practitioner: Yuliana Issa MD  Date of Initial Visit: Type: THERAPY  Noted: 2021  Today's Date: 2021  Patient seen for 10 sessions         Mary Deleon reports: the L side of her neck and L shoulder blade have been sore and she has had increased pain at times but overall has noticed improvement with her ability with ADL's.    Objective   See Exercise, Manual, and Modality Logs for complete treatment.     Assessment/Plan  Pt. responds well to manual intervention presenting with improved cervical ROM and manual techniques are provided. Pt completes exercises without pain and has relief of UT trigger points after manual, ultrasound, and ,moist heat application this date.    Progress strengthening /stabilization /functional activity           Timed:         Manual Therapy:    15     mins  42436;     Therapeutic Exercise:    15     mins  41182;     Ultrasound:     10     mins  10111;        Timed Treatment:   40   mins   Total Treatment:     55   mins    Lary Lujan PTA  Physical Therapist Assistant License #[id number]

## 2021-08-25 ENCOUNTER — TELEPHONE (OUTPATIENT)
Dept: CARDIOLOGY | Facility: CLINIC | Age: 71
End: 2021-08-25

## 2021-08-26 ENCOUNTER — TREATMENT (OUTPATIENT)
Dept: PHYSICAL THERAPY | Facility: CLINIC | Age: 71
End: 2021-08-26

## 2021-08-26 DIAGNOSIS — M43.6 NECK STIFFNESS: ICD-10-CM

## 2021-08-26 DIAGNOSIS — M54.2 CERVICALGIA: Primary | ICD-10-CM

## 2021-08-26 PROCEDURE — 97110 THERAPEUTIC EXERCISES: CPT | Performed by: PHYSICAL THERAPIST

## 2021-08-26 PROCEDURE — 97035 APP MDLTY 1+ULTRASOUND EA 15: CPT | Performed by: PHYSICAL THERAPIST

## 2021-08-26 PROCEDURE — 97140 MANUAL THERAPY 1/> REGIONS: CPT | Performed by: PHYSICAL THERAPIST

## 2021-08-26 NOTE — PROGRESS NOTES
Physical Therapy Daily Progress Note      Patient: Mary Deleon   : 1950  Diagnosis/ICD-10 Code:  Cervicalgia [M54.2]  Referring practitioner: Yuliana Issa MD  Date of Initial Visit: Type: THERAPY  Noted: 2021  Today's Date: 2021  Patient seen for 11 sessions         Mary Deleon reports: she has a couple of days of relief after last visit but states woke up with significant pain and soreness in L UT/scapular region which she states is where her pain is constantly now.    Objective   See Exercise, Manual, and Modality Logs for complete treatment.     Assessment/Plan   Pt. Tolerates treatment well this visit and completes exercises with no increased pain only some fatigue in her shoulder blades. Pt. States following manual intervention and ultrasound it felt loosened up and no pain which she state sis how it felt after last visit as well and she is unsure how she is causing the pain at home.    Progress strengthening /stabilization /functional activity           Timed:         Manual Therapy:    15     mins  43729;     Therapeutic Exercise:    15     mins  77558;       Ultrasound:     10     mins  19889;      Timed Treatment:   40   mins   Total Treatment:     40   mins    Lary Lujan PTA  Physical Therapist Assistant License #[id number]

## 2021-08-31 ENCOUNTER — TREATMENT (OUTPATIENT)
Dept: PHYSICAL THERAPY | Facility: CLINIC | Age: 71
End: 2021-08-31

## 2021-08-31 DIAGNOSIS — M54.2 CERVICALGIA: Primary | ICD-10-CM

## 2021-08-31 DIAGNOSIS — M43.6 NECK STIFFNESS: ICD-10-CM

## 2021-08-31 PROCEDURE — 97140 MANUAL THERAPY 1/> REGIONS: CPT | Performed by: PHYSICAL THERAPIST

## 2021-08-31 PROCEDURE — 97110 THERAPEUTIC EXERCISES: CPT | Performed by: PHYSICAL THERAPIST

## 2021-08-31 PROCEDURE — 97035 APP MDLTY 1+ULTRASOUND EA 15: CPT | Performed by: PHYSICAL THERAPIST

## 2021-08-31 NOTE — PROGRESS NOTES
Physical Therapy Daily Progress Note      Patient: Mary Deleon   : 1950  Diagnosis/ICD-10 Code:  Cervicalgia [M54.2]  Referring practitioner: Yuliana Issa MD  Date of Initial Visit: Type: THERAPY  Noted: 2021  Today's Date: 2021  Patient seen for 12 sessions         Mary Deleon reports: she has had significant improvement since beginning therapy and states her pain is less as well as more localized to her muscles in her neck. At this time and states her remaining limitation is looking side to side when driving.    NDI 24%    Objective   See Exercise, Manual, and Modality Logs for complete treatment.     Assessment/Plan     Pt. Continues to respond well to manual intervention for relief of tight muscle tissue as well as encouraging slow return of cervical mobility. Pt. Shows improved tolerance to strengthening and has pain relief with Ultrasound for multiple days after application. Pt. will continue to benenfit from PT for return of cervical mobility and strengthening of scapular base addressing postural issues that are a factor in pain as well.    Goals  Plan Goals: STGs 4 weeks:  1. Increase cervical ROM 5-10 degrees in each plane to help with ADLs and driving MET  2. Decrease spasm L neck to decrease max pain to < 2/10 MET  3. Pt to be indep in variety of easy home ex MET  4. NDI to improve from 24 to < 18% Not MET  LTGs 8 weeks:  1. Improve neck ROM by 15 degrees to state ease of driving and increased confidence in safety Not MET  2. Resolve spasm L neck MET  3. Pt indep in ex to keep L UE from freezing MET  4. NDI < 14% by dc Not MET    Progress strengthening /stabilization /functional activity           Timed:         Manual Therapy:    15     mins  02146;     Therapeutic Exercise:    13     mins  30376;     Ultrasound:                     10     mins  83466;      Timed Treatment:   38   mins   Total Treatment:     38   mins    Lary Lujan PTA  Physical Therapist  Assistant License #[id number]

## 2021-09-02 ENCOUNTER — TREATMENT (OUTPATIENT)
Dept: PHYSICAL THERAPY | Facility: CLINIC | Age: 71
End: 2021-09-02

## 2021-09-02 DIAGNOSIS — M54.2 CERVICALGIA: Primary | ICD-10-CM

## 2021-09-02 DIAGNOSIS — M43.6 NECK STIFFNESS: ICD-10-CM

## 2021-09-02 PROCEDURE — 97035 APP MDLTY 1+ULTRASOUND EA 15: CPT | Performed by: PHYSICAL THERAPIST

## 2021-09-02 PROCEDURE — 97110 THERAPEUTIC EXERCISES: CPT | Performed by: PHYSICAL THERAPIST

## 2021-09-02 PROCEDURE — 97140 MANUAL THERAPY 1/> REGIONS: CPT | Performed by: PHYSICAL THERAPIST

## 2021-09-02 NOTE — PROGRESS NOTES
Physical Therapy Daily Progress Note      Patient: Mary Deleon   : 1950  Diagnosis/ICD-10 Code:  Cervicalgia [M54.2]  Referring practitioner: Yuliana Issa MD  Date of Initial Visit: Type: THERAPY  Noted: 2021  Today's Date: 2021  Patient seen for 13 sessions         Mary Deleon reports: she has had progressing relief of pain with the last few visits but still has soreness and weakness at times just not to the same severity and believes therapy is helping continually.    Objective   See Exercise, Manual, and Modality Logs for complete treatment.     Assessment/Plan  Pt. shows progressed UE ROM and strength this date due to feeling better and reports her neck is even less sore at start of manual intervention. Pt. is improving in response to ultrasound at this time and will benefit from continued PT for improving ADL's.    Progress strengthening /stabilization /functional activity           Timed:         Manual Therapy:    15     mins  31242;     Therapeutic Exercise:    15     mins  74674;       Ultrasound:    8      mins  15158;        Timed Treatment:   38   mins   Total Treatment:     38   mins    Lary Lujan PTA  Physical Therapist Assistant License #[id number]

## 2021-09-07 ENCOUNTER — TREATMENT (OUTPATIENT)
Dept: PHYSICAL THERAPY | Facility: CLINIC | Age: 71
End: 2021-09-07

## 2021-09-07 DIAGNOSIS — M54.2 CERVICALGIA: Primary | ICD-10-CM

## 2021-09-07 DIAGNOSIS — M43.6 NECK STIFFNESS: ICD-10-CM

## 2021-09-07 PROCEDURE — 97110 THERAPEUTIC EXERCISES: CPT | Performed by: PHYSICAL THERAPIST

## 2021-09-07 PROCEDURE — 97140 MANUAL THERAPY 1/> REGIONS: CPT | Performed by: PHYSICAL THERAPIST

## 2021-09-07 PROCEDURE — 97035 APP MDLTY 1+ULTRASOUND EA 15: CPT | Performed by: PHYSICAL THERAPIST

## 2021-09-07 NOTE — PROGRESS NOTES
Physical Therapy Daily Progress Note      Patient: Mary Deleon   : 1950  Diagnosis/ICD-10 Code:  Cervicalgia [M54.2]  Referring practitioner: Yuliana Issa MD  Date of Initial Visit: Type: THERAPY  Noted: 2021  Today's Date: 2021  Patient seen for 14 sessions         Mary Deleon reports: she is still satisfied with the continued improvement she is having with motion in her arm and has been very pleased with the outcome and is hoping to continue and regain strength as therapy progresses.    Objective   See Exercise, Manual, and Modality Logs for complete treatment.     Assessment/Plan   Pt. Completes therapy well this date and shows improving UE strength as exercise is progressed UBE was increased to resistance 5 today. Pt. Will benefit from increased UE and cervical strengthening at this time due to improved pain.    Progress strengthening /stabilization /functional activity           Timed:         Manual Therapy:    15     mins  25052;     Therapeutic Exercise:    15     mins  61568;       Ultrasound:     8     mins  65659;        Timed Treatment:   38   mins   Total Treatment:     48   mins    Lary Lujan PTA  Physical Therapist Assistant License #[id number]

## 2021-09-08 ENCOUNTER — TELEPHONE (OUTPATIENT)
Dept: CARDIOLOGY | Facility: CLINIC | Age: 71
End: 2021-09-08

## 2021-09-09 ENCOUNTER — TREATMENT (OUTPATIENT)
Dept: PHYSICAL THERAPY | Facility: CLINIC | Age: 71
End: 2021-09-09

## 2021-09-09 DIAGNOSIS — M43.6 NECK STIFFNESS: ICD-10-CM

## 2021-09-09 DIAGNOSIS — M54.2 CERVICALGIA: Primary | ICD-10-CM

## 2021-09-09 PROCEDURE — 97035 APP MDLTY 1+ULTRASOUND EA 15: CPT | Performed by: PHYSICAL THERAPIST

## 2021-09-09 PROCEDURE — 97110 THERAPEUTIC EXERCISES: CPT | Performed by: PHYSICAL THERAPIST

## 2021-09-09 PROCEDURE — 97140 MANUAL THERAPY 1/> REGIONS: CPT | Performed by: PHYSICAL THERAPIST

## 2021-09-09 NOTE — PROGRESS NOTES
Physical Therapy Daily Progress Note      Patient: Mary Deleon   : 1950  Diagnosis/ICD-10 Code:  Cervicalgia [M54.2]  Referring practitioner: Yuliana Issa MD  Date of Initial Visit: Type: THERAPY  Noted: 2021  Today's Date: 2021  Patient seen for 15 sessions         Mary Deleon reports: she is still having neck stiffness and pain this date but much less than her usual pain. Pt. States she is getting 3 days of decent relief from therapy at this time but by the 4th day after a treatment she starts feeling like she needs therapy again.    Objective   See Exercise, Manual, and Modality Logs for complete treatment.     Assessment/Plan   Pt. Tolerates treatment well today with minimal  Discomfort throughout and relief of discomfort as manual techniques and ultrasound were performed. Pt. Was progressed to scaption plane with pulleys and punch plus eccentric lower was attempted however proved to strenuous for shoulder at this time and will be tried again in the future.    Progress strengthening /stabilization /functional activity           Timed:         Manual Therapy:    20     mins  73413;     Therapeutic Exercise:    15     mins  86908;        Ultrasound:     8     mins  48063;      Timed Treatment:   43   mins   Total Treatment:     43   mins    Lary Lujan PTA  Physical Therapist Assistant License #[id number]

## 2021-09-17 RX ORDER — ATORVASTATIN CALCIUM 40 MG/1
TABLET, FILM COATED ORAL
Qty: 90 TABLET | Refills: 3 | Status: SHIPPED | OUTPATIENT
Start: 2021-09-17 | End: 2022-09-19 | Stop reason: SDUPTHER

## 2021-09-17 NOTE — TELEPHONE ENCOUNTER
Rx Refill Note  Requested Prescriptions     Pending Prescriptions Disp Refills   • atorvastatin (LIPITOR) 40 MG tablet [Pharmacy Med Name: Atorvastatin Calcium 40 MG Oral Tablet] 90 tablet 0     Sig: TAKE 1 TABLET BY MOUTH ONCE DAILY AT BEDTIME      Last office visit with prescribing clinician: 6/24/2021      Next office visit with prescribing clinician: f/u in [place]     SCANNED - LABS (03/22/2021)         Sherry Lopes MA  09/17/21, 14:55 EDT

## 2021-09-22 LAB — INR PPP: 2.1

## 2021-09-23 ENCOUNTER — ANTICOAGULATION VISIT (OUTPATIENT)
Dept: CARDIOLOGY | Facility: CLINIC | Age: 71
End: 2021-09-23

## 2021-09-23 DIAGNOSIS — Z79.01 LONG TERM (CURRENT) USE OF ANTICOAGULANTS: ICD-10-CM

## 2021-09-23 DIAGNOSIS — I48.21 PERMANENT ATRIAL FIBRILLATION (HCC): Primary | ICD-10-CM

## 2021-09-27 ENCOUNTER — TREATMENT (OUTPATIENT)
Dept: PHYSICAL THERAPY | Facility: CLINIC | Age: 71
End: 2021-09-27

## 2021-09-27 DIAGNOSIS — M43.6 NECK STIFFNESS: ICD-10-CM

## 2021-09-27 DIAGNOSIS — M54.2 CERVICALGIA: Primary | ICD-10-CM

## 2021-09-27 PROCEDURE — 97140 MANUAL THERAPY 1/> REGIONS: CPT | Performed by: PHYSICAL THERAPIST

## 2021-09-27 PROCEDURE — 97110 THERAPEUTIC EXERCISES: CPT | Performed by: PHYSICAL THERAPIST

## 2021-09-29 ENCOUNTER — TREATMENT (OUTPATIENT)
Dept: PHYSICAL THERAPY | Facility: CLINIC | Age: 71
End: 2021-09-29

## 2021-09-29 DIAGNOSIS — M54.2 CERVICALGIA: Primary | ICD-10-CM

## 2021-09-29 DIAGNOSIS — M43.6 NECK STIFFNESS: ICD-10-CM

## 2021-09-29 PROCEDURE — 97140 MANUAL THERAPY 1/> REGIONS: CPT | Performed by: PHYSICAL THERAPIST

## 2021-09-29 PROCEDURE — 97110 THERAPEUTIC EXERCISES: CPT | Performed by: PHYSICAL THERAPIST

## 2021-09-29 PROCEDURE — G0283 ELEC STIM OTHER THAN WOUND: HCPCS | Performed by: PHYSICAL THERAPIST

## 2021-09-29 NOTE — PROGRESS NOTES
Physical Therapy Daily Progress Note      Patient: Mary Deleon   : 1950  Diagnosis/ICD-10 Code:  Cervicalgia [M54.2]  Referring practitioner: Yuliana Issa MD  Date of Initial Visit: Type: THERAPY  Noted: 2021  Today's Date: 2021  Patient seen for 17 sessions         Mary Deleon reports: she responded very well to Estim application for relief of UT and shoulder pain last visit stating she was able to sleep for 10 hours that night and had improved pain for 4 days. Pt. Reports her insurance provides payment for home traction unit and overhead pulleys     Objective   See Exercise, Manual, and Modality Logs for complete treatment.     Assessment/Plan   Pt. tolerates treatment well this date presenting with greater control of UE with revers pendulums. Pt. Also presents with less intense tightness sin B UT's this date to palpation and shows greater ability to relax during stretches.    Progress strengthening /stabilization /functional activity           Timed:         Manual Therapy:    20     mins  77865;     Therapeutic Exercise:    10     mins  34771;       Un-Timed:  Electrical Stimulation:    15     mins  07012 ( );    Timed Treatment:   30   mins   Total Treatment:     45   mins    Lary Lujan PTA  Physical Therapist Assistant License #[id number]

## 2021-10-06 ENCOUNTER — TELEPHONE (OUTPATIENT)
Dept: CARDIOLOGY | Facility: CLINIC | Age: 71
End: 2021-10-06

## 2021-10-06 NOTE — TELEPHONE ENCOUNTER
Clearance needed for tooth extraction  Dr. Selwyn Kwok DDS  [phone] phone  [phone] fax

## 2021-10-08 ENCOUNTER — TREATMENT (OUTPATIENT)
Dept: PHYSICAL THERAPY | Facility: CLINIC | Age: 71
End: 2021-10-08

## 2021-10-08 DIAGNOSIS — M43.6 NECK STIFFNESS: ICD-10-CM

## 2021-10-08 DIAGNOSIS — M54.2 CERVICALGIA: Primary | ICD-10-CM

## 2021-10-08 PROCEDURE — G0283 ELEC STIM OTHER THAN WOUND: HCPCS | Performed by: PHYSICAL THERAPIST

## 2021-10-08 PROCEDURE — 97140 MANUAL THERAPY 1/> REGIONS: CPT | Performed by: PHYSICAL THERAPIST

## 2021-10-08 PROCEDURE — 97110 THERAPEUTIC EXERCISES: CPT | Performed by: PHYSICAL THERAPIST

## 2021-10-08 NOTE — PROGRESS NOTES
Physical Therapy Daily Progress Note      Patient: Mary Deleon   : 1950  Diagnosis/ICD-10 Code:  Cervicalgia [M54.2]  Referring practitioner: Yuliana Issa MD  Date of Initial Visit: Type: THERAPY  Noted: 2021  Today's Date: 10/8/2021  Patient seen for 18 sessions         Mary Deleon reports: she is still having pain in her scapular border and her shoulder at this time. P.t does state her neck is much improved but she still has Limited function and limitations on how much she can do because of discomfort.    NDI  = 18% disability    Objective   See Exercise, Manual, and Modality Logs for complete treatment.     Assessment/Plan   Due to patients slowing of progress at this time in response to treatment therapy will discontinue currently with patient managing through HEP. Pt. is encouraged to follow up with MD and determine further steps she could take at this time. Despite pt desire to continue with therapy Pt. To transition to HEP due to plateau in progress because of frequent pending insurance authorizations impeding continuity in care.               Timed:         Manual Therapy:    15     mins  98592;     Therapeutic Exercise:    15     mins  66105;       Un-Timed:  Electrical Stimulation:    15     mins  48915 ( );      Timed Treatment:  30    mins   Total Treatment:     45   mins    Lary Lujan PTA  Physical Therapist Assistant License #[id number]

## 2021-11-08 ENCOUNTER — TELEPHONE (OUTPATIENT)
Dept: CARDIOLOGY | Facility: CLINIC | Age: 71
End: 2021-11-08

## 2021-11-08 NOTE — TELEPHONE ENCOUNTER
Called patient. Patient states she had an abscess, was on antibiotics for that. Dentist requesting she be on antibiotics before deep cleaning? Do you want to order or would you prefer dentist order this?    41 y/o F presents with 1 week of N/V/D and right side abdominal pain. Pt with hypokalemia. Will tx, check labs, IV fluids, and reevaluate. Likely discharge.

## 2021-11-09 ENCOUNTER — TELEPHONE (OUTPATIENT)
Dept: CARDIOLOGY | Facility: CLINIC | Age: 71
End: 2021-11-09

## 2021-11-09 NOTE — TELEPHONE ENCOUNTER
Pt called asking if she needs antibiotics prior to dental cleaning. I let pt know none of the diagnosis Dr Rios treats her for require prophylactics, but she should check with her cardiologist just to make sure. Pt verbalized understanding.

## 2021-12-08 ENCOUNTER — TELEPHONE (OUTPATIENT)
Dept: CARDIOLOGY | Facility: CLINIC | Age: 71
End: 2021-12-08

## 2021-12-13 ENCOUNTER — ANTICOAGULATION VISIT (OUTPATIENT)
Dept: CARDIOLOGY | Facility: CLINIC | Age: 71
End: 2021-12-13

## 2021-12-13 DIAGNOSIS — I48.21 PERMANENT ATRIAL FIBRILLATION (HCC): Primary | ICD-10-CM

## 2021-12-13 DIAGNOSIS — Z79.01 LONG TERM (CURRENT) USE OF ANTICOAGULANTS: ICD-10-CM

## 2021-12-13 LAB — INR PPP: 1.6

## 2021-12-20 ENCOUNTER — TELEPHONE (OUTPATIENT)
Dept: CARDIOLOGY | Facility: CLINIC | Age: 71
End: 2021-12-20

## 2021-12-20 RX ORDER — METOPROLOL SUCCINATE 25 MG/1
TABLET, EXTENDED RELEASE ORAL
Qty: 90 TABLET | Refills: 1 | Status: SHIPPED | OUTPATIENT
Start: 2021-12-20 | End: 2022-01-18 | Stop reason: SDUPTHER

## 2021-12-20 NOTE — PROGRESS NOTES
Spoke to patient, she was off warfarin for 1 week for took extraction, has been back on warfarin several weeks. Advised her to take 65 mg today and 4 mg daily, Check INR 3 weeks.

## 2021-12-20 NOTE — TELEPHONE ENCOUNTER
Caller: Mary eDleon     Relationship: self    Best call back number: [phone]    What is your medical concern? INR 12/13/21 1.6. WAS DONE AT DR. MACEDO[facility].

## 2022-01-18 ENCOUNTER — OFFICE VISIT (OUTPATIENT)
Dept: CARDIOLOGY | Facility: CLINIC | Age: 72
End: 2022-01-18

## 2022-01-18 ENCOUNTER — ANTICOAGULATION VISIT (OUTPATIENT)
Dept: CARDIOLOGY | Facility: CLINIC | Age: 72
End: 2022-01-18

## 2022-01-18 VITALS
BODY MASS INDEX: 35.44 KG/M2 | SYSTOLIC BLOOD PRESSURE: 131 MMHG | DIASTOLIC BLOOD PRESSURE: 76 MMHG | OXYGEN SATURATION: 96 % | WEIGHT: 200 LBS | HEART RATE: 61 BPM | HEIGHT: 63 IN

## 2022-01-18 DIAGNOSIS — I25.110 CORONARY ARTERY DISEASE INVOLVING NATIVE CORONARY ARTERY OF NATIVE HEART WITH UNSTABLE ANGINA PECTORIS: ICD-10-CM

## 2022-01-18 DIAGNOSIS — G47.33 OBSTRUCTIVE SLEEP APNEA: ICD-10-CM

## 2022-01-18 DIAGNOSIS — I10 PRIMARY HYPERTENSION: ICD-10-CM

## 2022-01-18 DIAGNOSIS — I48.21 PERMANENT ATRIAL FIBRILLATION: Primary | ICD-10-CM

## 2022-01-18 DIAGNOSIS — E11.9 TYPE 2 DIABETES MELLITUS WITHOUT COMPLICATION, WITHOUT LONG-TERM CURRENT USE OF INSULIN: ICD-10-CM

## 2022-01-18 DIAGNOSIS — E78.2 MIXED HYPERLIPIDEMIA: ICD-10-CM

## 2022-01-18 DIAGNOSIS — Z79.01 LONG TERM (CURRENT) USE OF ANTICOAGULANTS: ICD-10-CM

## 2022-01-18 LAB — INR PPP: 2

## 2022-01-18 PROCEDURE — 99214 OFFICE O/P EST MOD 30 MIN: CPT | Performed by: INTERNAL MEDICINE

## 2022-01-18 RX ORDER — ISOSORBIDE DINITRATE 10 MG/1
10 TABLET ORAL 3 TIMES DAILY
Qty: 270 TABLET | Refills: 3 | Status: SHIPPED | OUTPATIENT
Start: 2022-01-18 | End: 2022-07-05 | Stop reason: SDUPTHER

## 2022-01-18 RX ORDER — RANOLAZINE 500 MG/1
500 TABLET, EXTENDED RELEASE ORAL 2 TIMES DAILY
Qty: 180 TABLET | Refills: 3 | Status: SHIPPED | OUTPATIENT
Start: 2022-01-18 | End: 2022-01-19

## 2022-01-18 RX ORDER — METOPROLOL SUCCINATE 25 MG/1
TABLET, EXTENDED RELEASE ORAL
Qty: 90 TABLET | Refills: 3 | Status: SHIPPED | OUTPATIENT
Start: 2022-01-18 | End: 2022-08-11

## 2022-01-18 RX ORDER — AMLODIPINE BESYLATE 5 MG/1
5 TABLET ORAL DAILY
Qty: 90 TABLET | Refills: 3 | Status: SHIPPED | OUTPATIENT
Start: 2022-01-18 | End: 2023-01-24

## 2022-01-18 NOTE — PROGRESS NOTES
"    Subjective:     Encounter Date:01/18/2022      Patient ID: Mary Deleon is a 71 y.o. female.    Chief Complaint:  History of Present Illness 71-year-old white female with history of coronary status post carotid bypass surgery history of hypertension hyperlipidemia diabetes sleep apnea and pulmonary atrial fibrillation the past presents to my office for follow-up.  Patient is currently stable without any signs of chest pain or shortness of breath at rest or exertion.  No complains any PND orthopnea.  She has occasional palpitation without any dizziness syncope or swelling of the feet.  Patient has been taking all the medicines regularly.  Patient does not smoke.  Patient is trying to exercise regular.  She follows a good diet.    The following portions of the patient's history were reviewed and updated as appropriate: allergies, current medications, past family history, past medical history, past social history, past surgical history and problem list.  Past Medical History:   Diagnosis Date   • Arthritis    • Atrial fibrillation (HCC)    • COPD (chronic obstructive pulmonary disease) (HCC)    • Coronary artery disease    • Depression    • Diverticulitis    • Hyperlipidemia    • Hypertension    • Mitral valve prolapse      Past Surgical History:   Procedure Laterality Date   • CARDIAC CATHETERIZATION N/A 1/11/2021    Procedure: Coronary angiography;  Surgeon: Al Garcia MD;  Location: [facility] CATH INVASIVE LOCATION;  Service: Cardiovascular;  Laterality: N/A;   • CARDIAC CATHETERIZATION N/A 1/11/2021    Procedure: Saphenous Vein Graft;  Surgeon: Al Garcia MD;  Location: [facility] CATH INVASIVE LOCATION;  Service: Cardiovascular;  Laterality: N/A;   • HYSTERECTOMY     • KNEE SURGERY       /76 (BP Location: Left arm, Patient Position: Sitting)   Pulse 61   Ht 160 cm (63\")   Wt 90.7 kg (200 lb)   LMP  (LMP Unknown)   SpO2 96%   BMI 35.43 kg/m²   Family History "   Problem Relation Age of Onset   • Heart disease Mother    • Pneumonia Mother    • Asthma Father    • Alzheimer's disease Father    • Heart disease Father        Current Outpatient Medications:   •  amLODIPine (NORVASC) 5 MG tablet, Take 1 tablet by mouth Daily., Disp: 90 tablet, Rfl: 3  •  aspirin 81 MG EC tablet, Take 81 mg by mouth Daily., Disp: , Rfl:   •  atorvastatin (LIPITOR) 40 MG tablet, TAKE 1 TABLET BY MOUTH ONCE DAILY AT BEDTIME, Disp: 90 tablet, Rfl: 3  •  BREO ELLIPTA 100-25 MCG/INH inhaler, Inhale 1 puff Daily., Disp: , Rfl: 5  •  cetirizine (zyrTEC) 5 MG tablet, Take 10 mg by mouth Daily., Disp: , Rfl:   •  Docusate Sodium (COLACE PO), Take 2 capsules by mouth 2 (two) times a day., Disp: , Rfl:   •  isosorbide dinitrate (ISORDIL) 10 MG tablet, Take 1 tablet by mouth 3 (Three) Times a Day., Disp: 270 tablet, Rfl: 3  •  levothyroxine (Euthyrox) 25 MCG tablet, Take 25 mcg by mouth Daily., Disp: , Rfl:   •  metFORMIN (GLUCOPHAGE) 500 MG tablet, Take 500 mg by mouth 2 (Two) Times a Day., Disp: , Rfl: 1  •  metoprolol succinate XL (TOPROL-XL) 25 MG 24 hr tablet, 1 tablet daily, Disp: 90 tablet, Rfl: 3  •  montelukast (SINGULAIR) 10 MG tablet, Take 10 mg by mouth Daily., Disp: , Rfl:   •  Omega-3 Fatty Acids (fish oil) 500 MG capsule capsule, Take 500 mg by mouth Daily With Breakfast., Disp: , Rfl:   •  ONETOUCH DELICA LANCETS FINE misc, USE TO CHECK GLUCOSE TWICE DAILY, Disp: , Rfl: 5  •  ONETOUCH VERIO test strip, USE TO CHECK GLUCOSE TWICE DAILY, Disp: , Rfl: 3  •  Probiotic Product (PROBIOTIC-10 PO), Take  by mouth., Disp: , Rfl:   •  ranolazine (RANEXA) 500 MG 12 hr tablet, Take 1 tablet by mouth 2 (Two) Times a Day., Disp: 180 tablet, Rfl: 3  •  SPIRIVA RESPIMAT 1.25 MCG/ACT aerosol solution inhaler, Inhale 2 puffs Daily., Disp: , Rfl: 1  •  warfarin (COUMADIN) 3 MG tablet, Take 3 mg by mouth 1 (One) Time Per Week. Tues, Disp: , Rfl:   •  warfarin (COUMADIN) 4 MG tablet, Take one tablet by mouth  "daily except Tuesday or as directed, Disp: 90 tablet, Rfl: 0  Allergies   Allergen Reactions   • Omeprazole Hives   • Codeine GI Intolerance   • Sulfa Antibiotics Hives   • Tylenol With Codeine #3 [Acetaminophen-Codeine] GI Intolerance   • Vancomycin Other (See Comments)     \"red man syndrome\" - turned red from head to toe   • Naproxen Rash   • Naproxen Sodium Rash     Social History     Socioeconomic History   • Marital status:    Tobacco Use   • Smoking status: Passive Smoke Exposure - Never Smoker   • Smokeless tobacco: Never Used   Vaping Use   • Vaping Use: Never used   Substance and Sexual Activity   • Alcohol use: No   • Drug use: No   • Sexual activity: Defer     Review of Systems   Constitutional: Negative for fever and malaise/fatigue.   Cardiovascular: Negative for chest pain, dyspnea on exertion and palpitations.   Respiratory: Negative for cough and shortness of breath.    Skin: Negative for rash.   Gastrointestinal: Negative for abdominal pain, nausea and vomiting.   Neurological: Negative for focal weakness and headaches.   All other systems reviewed and are negative.             Objective:     Constitutional:       Appearance: Well-developed.   Eyes:      General: No scleral icterus.     Conjunctiva/sclera: Conjunctivae normal.   HENT:      Head: Normocephalic and atraumatic.   Neck:      Vascular: No carotid bruit or JVD.   Pulmonary:      Effort: Pulmonary effort is normal.      Breath sounds: Normal breath sounds. No wheezing. No rales.   Cardiovascular:      Normal rate. Regular rhythm.   Pulses:     Intact distal pulses.   Abdominal:      General: Bowel sounds are normal.      Palpations: Abdomen is soft.   Musculoskeletal:      Cervical back: Normal range of motion and neck supple. Skin:     General: Skin is warm and dry.      Findings: No rash.   Neurological:      Mental Status: Alert.       Procedures    Lab Review:         MDM  1.  Coronary disease  Patient had coronary bypass " surgery x3 vessels with a LIMA to LAD and saphenous graft to the marginal branch and RCA and has normal V systolic function  2.  Hypertension  Patient blood pressure currently stable on medical therapy with beta-blockers  3.  Hyperlipidemia  Patient is on statins and the lipid levels are well within normal limits  4.  Diabetes  Patient is on oral medicines and followed by the primary care doctor  5.  Atrial fibrillation  Patient is currently stable on medical therapy including warfarin and keep his INR between 2.0-3.0  6.  Sleep apnea  Patient has sleep apnea and uses a CPAP machine      Patient's previous medical records, labs, and EKG were reviewed and discussed with the patient at today's visit.

## 2022-02-16 ENCOUNTER — ANTICOAGULATION VISIT (OUTPATIENT)
Dept: CARDIOLOGY | Facility: CLINIC | Age: 72
End: 2022-02-16

## 2022-02-16 DIAGNOSIS — Z79.01 LONG TERM (CURRENT) USE OF ANTICOAGULANTS: ICD-10-CM

## 2022-02-16 DIAGNOSIS — I48.21 PERMANENT ATRIAL FIBRILLATION: Primary | ICD-10-CM

## 2022-02-16 LAB — INR PPP: 1.8

## 2022-02-24 ENCOUNTER — OFFICE VISIT (OUTPATIENT)
Dept: CARDIOLOGY | Facility: CLINIC | Age: 72
End: 2022-02-24

## 2022-02-24 VITALS
WEIGHT: 200 LBS | OXYGEN SATURATION: 98 % | HEART RATE: 55 BPM | HEIGHT: 63 IN | DIASTOLIC BLOOD PRESSURE: 88 MMHG | SYSTOLIC BLOOD PRESSURE: 168 MMHG | BODY MASS INDEX: 35.44 KG/M2

## 2022-02-24 DIAGNOSIS — I10 PRIMARY HYPERTENSION: ICD-10-CM

## 2022-02-24 DIAGNOSIS — G47.33 OBSTRUCTIVE SLEEP APNEA: ICD-10-CM

## 2022-02-24 DIAGNOSIS — E78.2 MIXED HYPERLIPIDEMIA: ICD-10-CM

## 2022-02-24 DIAGNOSIS — I48.0 PAROXYSMAL ATRIAL FIBRILLATION: Primary | ICD-10-CM

## 2022-02-24 DIAGNOSIS — I25.110 CORONARY ARTERY DISEASE INVOLVING NATIVE CORONARY ARTERY OF NATIVE HEART WITH UNSTABLE ANGINA PECTORIS: ICD-10-CM

## 2022-02-24 PROCEDURE — 99214 OFFICE O/P EST MOD 30 MIN: CPT | Performed by: INTERNAL MEDICINE

## 2022-02-24 PROCEDURE — 93000 ELECTROCARDIOGRAM COMPLETE: CPT | Performed by: INTERNAL MEDICINE

## 2022-02-24 NOTE — PROGRESS NOTES
CC-Atrial fibrillation, sleep apnea         Sub--71-year-old female patient underwent prior cardioversion and has prior bradycardia post cardioversion and patient was on digoxin and amiodarone which have been stopped and left on low-dose of beta-blockers and started having recurrent arrhythmias .Patient has known coronary artery disease with prior bypass surgery done in 2017 with concomitant Maze procedure.   patient was on amiodarone and beta-blockers and digoxin and patient was left on low-dose of beta-blockers with recurrence of arrhythmias And patient was found to have severe biatrial enlargement and resistant right atrial flutter and underwent atrial flutter ablation which was a counter-clockwise right-sided flutter and after that patient had recurrent atrial fibrillation and underwent cryoablation  months ago and feels remarkably well since ablation and comes in for follow-up   She is using her CPAP machine  She has also has rheumatoid arthritis without any recent flareup  She feels remarkably well  She is very compliant with her medications  Had cardiac cath--which revealed    SUMMARY: 2 out of 3 grafts were open  The vein graft to the marginal branch was occluded  LIMA to LAD was open but flow to the diagonal arteries were compromised          Past Medical History:     Reviewed history from 10/18/2018 and no changes required:        Hypertension        Dyslipidemia        Coronary Artery Disease (05/25/2017)        C O P D        Chronic-Atrial Fibrillation        S/P CABG x3 Vessels        Atrial Flutter     Past Surgical History:     Reviewed history from 05/30/2019 and no changes required:        Cardiac Cath 2003  with narrowing distal LAD and prox Cx.         Hyster and BSO        Torn miniscus on left knee x2        Heart Catherization (05/24/2017)        C A B G: x3 Vessels, LIMA to LAD, SVG to PDA & SVG to OM3 (05/25/2017)        Cardioversion 9/24/18         Ablation 1/2/19 Dr. MAST                 Physical Exam     General:      well developed, well nourished, in no acute distress.    Head:      normocephalic and atraumatic.    Eyes:      PERRL/EOM intact, conjunctiva and sclera clear with out nystagmus.    Neck:      no masses, thyromegaly, TRACHEA CENTRAL WITH NORMAL RESPIRATORY EFFORT  Lungs:      clear bilaterally to auscultation.    Heart:      regular rate and rhythm, S1, S2 without murmurs, rubs, or gallops                assessment plan   recurrent atrial arrhythmias in the form of atrial flutter / atrial fibrillation with prior Maze procedure-- post right atrial flutter ablation with recurrence of atrial fibrillation-- post cryoablation with resolution of symptoms  Treated sleep apnea   coronary artery disease with prior bypass surgery--stable on imdur--cath report reviewed--on ranexa   hypertension--home monitoring recommended   diabetes--PCP following      follow-up 6 months   medications reviewed   kindly note patient's left atrial appendage is patent  Asymptomatic PVCs  INR--1.8        ECG 12 Lead    Date/Time: 2/24/2022 1:54 PM  Performed by: Louie Rios MD  Authorized by: Louie Rios MD   Comparison: compared with previous ECG   Similar to previous ECG  Rhythm: sinus rhythm  Ectopy: atrial premature contractions  Rate: normal  Conduction: conduction normal  QRS axis: right  Other findings: non-specific ST-T wave changes  Comments: Artifacts noted            Electronically signed by Louie Rios MD, 02/24/22, 1:54 PM EST.

## 2022-04-26 ENCOUNTER — TELEPHONE (OUTPATIENT)
Dept: CARDIOLOGY | Facility: CLINIC | Age: 72
End: 2022-04-26

## 2022-04-26 NOTE — TELEPHONE ENCOUNTER
Spoke to pt, she said that Dr. Issa's office karissa INR and metabolic panel in March. Advised her that we did not have copies of those results. She will call them and ask them to send the results. Kamila

## 2022-05-10 ENCOUNTER — ANTICOAGULATION VISIT (OUTPATIENT)
Dept: CARDIOLOGY | Facility: CLINIC | Age: 72
End: 2022-05-10

## 2022-05-10 ENCOUNTER — TELEPHONE (OUTPATIENT)
Dept: CARDIOLOGY | Facility: CLINIC | Age: 72
End: 2022-05-10

## 2022-05-10 DIAGNOSIS — Z79.01 LONG TERM (CURRENT) USE OF ANTICOAGULANTS: ICD-10-CM

## 2022-05-10 DIAGNOSIS — I48.21 PERMANENT ATRIAL FIBRILLATION: Primary | ICD-10-CM

## 2022-05-10 LAB — INR PPP: 2.9

## 2022-05-16 ENCOUNTER — TELEPHONE (OUTPATIENT)
Dept: CARDIOLOGY | Facility: CLINIC | Age: 72
End: 2022-05-16

## 2022-05-16 NOTE — TELEPHONE ENCOUNTER
Pt called she started abx for UTI on Friday. She is going tomorrow for INR check at PCP office apLP[facility]

## 2022-05-17 ENCOUNTER — ANTICOAGULATION VISIT (OUTPATIENT)
Dept: CARDIOLOGY | Facility: CLINIC | Age: 72
End: 2022-05-17

## 2022-05-17 DIAGNOSIS — Z79.01 LONG TERM (CURRENT) USE OF ANTICOAGULANTS: ICD-10-CM

## 2022-05-17 DIAGNOSIS — I48.21 PERMANENT ATRIAL FIBRILLATION: Primary | ICD-10-CM

## 2022-05-17 LAB — INR PPP: 1.4

## 2022-05-31 ENCOUNTER — ANTICOAGULATION VISIT (OUTPATIENT)
Dept: CARDIOLOGY | Facility: CLINIC | Age: 72
End: 2022-05-31

## 2022-05-31 DIAGNOSIS — Z79.01 LONG TERM (CURRENT) USE OF ANTICOAGULANTS: ICD-10-CM

## 2022-05-31 DIAGNOSIS — I48.21 PERMANENT ATRIAL FIBRILLATION: Primary | ICD-10-CM

## 2022-05-31 LAB — INR PPP: 2

## 2022-06-30 ENCOUNTER — ANTICOAGULATION VISIT (OUTPATIENT)
Dept: CARDIOLOGY | Facility: CLINIC | Age: 72
End: 2022-06-30

## 2022-06-30 DIAGNOSIS — I48.21 PERMANENT ATRIAL FIBRILLATION: Primary | ICD-10-CM

## 2022-06-30 DIAGNOSIS — Z79.01 LONG TERM (CURRENT) USE OF ANTICOAGULANTS: ICD-10-CM

## 2022-06-30 LAB — INR PPP: 2.1

## 2022-07-05 RX ORDER — ISOSORBIDE DINITRATE 10 MG/1
10 TABLET ORAL 3 TIMES DAILY
Qty: 270 TABLET | Refills: 3 | Status: SHIPPED | OUTPATIENT
Start: 2022-07-05

## 2022-07-12 ENCOUNTER — OFFICE VISIT (OUTPATIENT)
Dept: CARDIOLOGY | Facility: CLINIC | Age: 72
End: 2022-07-12

## 2022-07-12 VITALS
OXYGEN SATURATION: 95 % | WEIGHT: 191.5 LBS | HEIGHT: 63 IN | HEART RATE: 69 BPM | SYSTOLIC BLOOD PRESSURE: 109 MMHG | DIASTOLIC BLOOD PRESSURE: 66 MMHG | BODY MASS INDEX: 33.93 KG/M2

## 2022-07-12 DIAGNOSIS — I10 PRIMARY HYPERTENSION: ICD-10-CM

## 2022-07-12 DIAGNOSIS — G47.33 OBSTRUCTIVE SLEEP APNEA: ICD-10-CM

## 2022-07-12 DIAGNOSIS — E11.9 TYPE 2 DIABETES MELLITUS WITHOUT COMPLICATION, WITHOUT LONG-TERM CURRENT USE OF INSULIN: ICD-10-CM

## 2022-07-12 DIAGNOSIS — E78.2 MIXED HYPERLIPIDEMIA: ICD-10-CM

## 2022-07-12 DIAGNOSIS — I48.21 PERMANENT ATRIAL FIBRILLATION: Primary | ICD-10-CM

## 2022-07-12 DIAGNOSIS — I25.110 CORONARY ARTERY DISEASE INVOLVING NATIVE CORONARY ARTERY OF NATIVE HEART WITH UNSTABLE ANGINA PECTORIS: ICD-10-CM

## 2022-07-12 PROCEDURE — 99214 OFFICE O/P EST MOD 30 MIN: CPT | Performed by: INTERNAL MEDICINE

## 2022-07-12 RX ORDER — GRANULES FOR ORAL 3 G/1
POWDER ORAL ONCE
COMMUNITY

## 2022-07-12 NOTE — PROGRESS NOTES
"    Subjective:     Encounter Date:07/12/2022      Patient ID: Mary Deleon is a 71 y.o. female.    Chief Complaint:  History of Present Illness 71-year-old white female with history of coronary disease diabetes hypertension hyperlipidemia sleep apnea and pulmonary fibrillation presents to my office for follow-up.  Patient is currently stable without any symptoms of chest pain or shortness of breath at rest on exertion.  No complains any PND orthopnea.  No palpitation but has dizziness.  No syncope.  She has some mild swelling of the feet.  She is taking her meds regularly but she does not smoke.    The following portions of the patient's history were reviewed and updated as appropriate: allergies, current medications, past family history, past medical history, past social history, past surgical history and problem list.  Past Medical History:   Diagnosis Date   • Arthritis    • Atrial fibrillation (HCC)    • COPD (chronic obstructive pulmonary disease) (HCC)    • Coronary artery disease    • Depression    • Diverticulitis    • Hyperlipidemia    • Hypertension    • Mitral valve prolapse      Past Surgical History:   Procedure Laterality Date   • CARDIAC CATHETERIZATION N/A 1/11/2021    Procedure: Coronary angiography;  Surgeon: Al Garcia MD;  Location: [facility] CATH INVASIVE LOCATION;  Service: Cardiovascular;  Laterality: N/A;   • CARDIAC CATHETERIZATION N/A 1/11/2021    Procedure: Saphenous Vein Graft;  Surgeon: Al Garcia MD;  Location: [facility] CATH INVASIVE LOCATION;  Service: Cardiovascular;  Laterality: N/A;   • HYSTERECTOMY     • KNEE SURGERY       /66 (BP Location: Left arm, Patient Position: Sitting, Cuff Size: Adult)   Pulse 69   Ht 160 cm (63\")   Wt 86.9 kg (191 lb 8 oz)   LMP  (LMP Unknown)   SpO2 95%   BMI 33.92 kg/m²   Family History   Problem Relation Age of Onset   • Heart disease Mother    • Pneumonia Mother    • Asthma Father    • Alzheimer's " disease Father    • Heart disease Father        Current Outpatient Medications:   •  amLODIPine (NORVASC) 5 MG tablet, Take 1 tablet by mouth Daily., Disp: 90 tablet, Rfl: 3  •  aspirin 81 MG EC tablet, Take 81 mg by mouth Daily., Disp: , Rfl:   •  atorvastatin (LIPITOR) 40 MG tablet, TAKE 1 TABLET BY MOUTH ONCE DAILY AT BEDTIME, Disp: 90 tablet, Rfl: 3  •  BREO ELLIPTA 100-25 MCG/INH inhaler, Inhale 1 puff Daily., Disp: , Rfl: 5  •  cetirizine (zyrTEC) 5 MG tablet, Take 10 mg by mouth Daily., Disp: , Rfl:   •  Docusate Sodium (COLACE PO), Take 2 capsules by mouth 2 (two) times a day., Disp: , Rfl:   •  fosfomycin (MONUROL) 3 g pack, Take  by mouth 1 (One) Time., Disp: , Rfl:   •  isosorbide dinitrate (ISORDIL) 10 MG tablet, Take 1 tablet by mouth 3 (Three) Times a Day., Disp: 270 tablet, Rfl: 3  •  levothyroxine (SYNTHROID, LEVOTHROID) 25 MCG tablet, Take 25 mcg by mouth Daily., Disp: , Rfl:   •  metFORMIN (GLUCOPHAGE) 500 MG tablet, Take 500 mg by mouth 2 (Two) Times a Day., Disp: , Rfl: 1  •  metoprolol succinate XL (TOPROL-XL) 25 MG 24 hr tablet, 1 tablet daily, Disp: 90 tablet, Rfl: 3  •  montelukast (SINGULAIR) 10 MG tablet, Take 10 mg by mouth Daily., Disp: , Rfl:   •  Omega-3 Fatty Acids (fish oil) 500 MG capsule capsule, Take 500 mg by mouth Daily With Breakfast., Disp: , Rfl:   •  ONETOUCH DELICA LANCETS FINE misc, USE TO CHECK GLUCOSE TWICE DAILY, Disp: , Rfl: 5  •  ONETOUCH VERIO test strip, USE TO CHECK GLUCOSE TWICE DAILY, Disp: , Rfl: 3  •  Probiotic Product (PROBIOTIC-10 PO), Take  by mouth., Disp: , Rfl:   •  SPIRIVA RESPIMAT 1.25 MCG/ACT aerosol solution inhaler, Inhale 2 puffs Daily., Disp: , Rfl: 1  •  warfarin (COUMADIN) 3 MG tablet, Take 3 mg by mouth 1 (One) Time Per Week. Tues, Disp: , Rfl:   •  warfarin (COUMADIN) 4 MG tablet, Take one tablet by mouth daily except Tuesday or as directed, Disp: 90 tablet, Rfl: 0  Allergies   Allergen Reactions   • Omeprazole Hives   • Codeine GI Intolerance  "  • Sulfa Antibiotics Hives   • Tylenol With Codeine #3 [Acetaminophen-Codeine] GI Intolerance   • Vancomycin Other (See Comments)     \"red man syndrome\" - turned red from head to toe   • Naproxen Rash   • Naproxen Sodium Rash     Social History     Socioeconomic History   • Marital status:    Tobacco Use   • Smoking status: Passive Smoke Exposure - Never Smoker   • Smokeless tobacco: Never Used   Vaping Use   • Vaping Use: Never used   Substance and Sexual Activity   • Alcohol use: No   • Drug use: No   • Sexual activity: Defer     Review of Systems   Constitutional: Negative for fever and malaise/fatigue.   Cardiovascular: Positive for leg swelling. Negative for chest pain, dyspnea on exertion and palpitations.   Respiratory: Negative for cough and shortness of breath.    Skin: Negative for rash.   Gastrointestinal: Negative for abdominal pain, nausea and vomiting.   Neurological: Positive for light-headedness and numbness. Negative for focal weakness and headaches.   All other systems reviewed and are negative.             Objective:     Constitutional:       Appearance: Well-developed.   Eyes:      General: No scleral icterus.     Conjunctiva/sclera: Conjunctivae normal.   HENT:      Head: Normocephalic and atraumatic.   Neck:      Vascular: No carotid bruit or JVD.   Pulmonary:      Effort: Pulmonary effort is normal.      Breath sounds: Normal breath sounds. No wheezing. No rales.   Cardiovascular:      Normal rate. Regular rhythm.   Pulses:     Intact distal pulses.   Abdominal:      General: Bowel sounds are normal.      Palpations: Abdomen is soft.   Musculoskeletal:      Cervical back: Normal range of motion and neck supple. Skin:     General: Skin is warm and dry.      Findings: No rash.   Neurological:      Mental Status: Alert.       Procedures    Lab Review:         MDM  1.  Atrial fibrillation  Patient has permanent atrial fibrillation is currently stable on medications including metoprolol " and warfarin for anticoagulation  2.  Coronary artery disease  Patient has nonobstructive disease now but had coronary bypass surgery x3 vessels with a LIMA to LAD and saphenous venous graft to the marginal branch and RCA.    #3 hypertension  Patient blood pressure currently stable on medications with metoprolol    #4 hyperlipidemia  Been on statins and the lipids are well within normal limits  5.  Sleep apnea  Patient has sleep apnea and uses a CPAP machine.      Patient's previous medical records, labs, and EKG were reviewed and discussed with the patient at today's visit.

## 2022-08-11 RX ORDER — METOPROLOL SUCCINATE 25 MG/1
TABLET, EXTENDED RELEASE ORAL
Qty: 90 TABLET | Refills: 0 | Status: SHIPPED | OUTPATIENT
Start: 2022-08-11 | End: 2022-08-25 | Stop reason: SDUPTHER

## 2022-08-25 ENCOUNTER — OFFICE VISIT (OUTPATIENT)
Dept: CARDIOLOGY | Facility: CLINIC | Age: 72
End: 2022-08-25

## 2022-08-25 VITALS
WEIGHT: 193 LBS | BODY MASS INDEX: 34.2 KG/M2 | OXYGEN SATURATION: 96 % | HEART RATE: 60 BPM | HEIGHT: 63 IN | SYSTOLIC BLOOD PRESSURE: 136 MMHG | DIASTOLIC BLOOD PRESSURE: 70 MMHG

## 2022-08-25 DIAGNOSIS — I48.0 PAROXYSMAL ATRIAL FIBRILLATION: ICD-10-CM

## 2022-08-25 DIAGNOSIS — R42 VERTIGO: ICD-10-CM

## 2022-08-25 DIAGNOSIS — G47.33 OBSTRUCTIVE SLEEP APNEA: ICD-10-CM

## 2022-08-25 DIAGNOSIS — E78.2 MIXED HYPERLIPIDEMIA: ICD-10-CM

## 2022-08-25 DIAGNOSIS — I25.110 CORONARY ARTERY DISEASE INVOLVING NATIVE CORONARY ARTERY OF NATIVE HEART WITH UNSTABLE ANGINA PECTORIS: Primary | ICD-10-CM

## 2022-08-25 DIAGNOSIS — I10 PRIMARY HYPERTENSION: ICD-10-CM

## 2022-08-25 PROCEDURE — 99214 OFFICE O/P EST MOD 30 MIN: CPT | Performed by: INTERNAL MEDICINE

## 2022-08-25 PROCEDURE — 93000 ELECTROCARDIOGRAM COMPLETE: CPT | Performed by: INTERNAL MEDICINE

## 2022-08-25 RX ORDER — METOPROLOL SUCCINATE 25 MG/1
TABLET, EXTENDED RELEASE ORAL
Qty: 90 TABLET | Refills: 1 | Status: SHIPPED | OUTPATIENT
Start: 2022-08-25

## 2022-08-25 NOTE — PROGRESS NOTES
CC-Atrial fibrillation, sleep apnea         Sub--71-year-old patient came for a follow-up.  She had prior history of bradycardia post cardioversion and she was on digoxin and amiodarone which have been stopped in the past.  Patient has known coronary disease with prior bypass surgery in 2017 with concomitant Maze procedure.  She has obstructive sleep apnea on CPAP therapy and history of rheumatoid arthritis.  Prior cardiac catheterization showed 2 out of 3 grafts were open with a vein graft to the marginal being occluded and the LIMA to the LAD was patent  She does have hypertension and hyperlipidemia  Prior history of atrial flutter and AF ablation  Complains of vertigo, has tinnitus       Past Medical History:     Reviewed history from 10/18/2018 and no changes required:        Hypertension        Dyslipidemia        Coronary Artery Disease (05/25/2017)        C O P D        Chronic-Atrial Fibrillation        S/P CABG x3 Vessels        Atrial Flutter     Past Surgical History:     Reviewed history from 05/30/2019 and no changes required:        Cardiac Cath 2003  with narrowing distal LAD and prox Cx.         Hyster and BSO        Torn miniscus on left knee x2        Heart Catherization (05/24/2017)        C A B G: x3 Vessels, LIMA to LAD, SVG to PDA & SVG to OM3 (05/25/2017)        Cardioversion 9/24/18         Ablation 1/2/19 Dr. MAST           Physical Exam    General:      well developed, well nourished, in no acute distress.    Head:      normocephalic and atraumatic.    Eyes:      PERRL/EOM intact, conjunctivae and sclerae clear without nystagmus.    Neck:      no masses, thyromegaly, trachea central with normal respiratory effort  Lungs:      clear bilaterally to auscultation.    Heart:      irregular rate and rhythm, S1, S2 without murmurs, rubs, or gallops  Skin:      intact without lesions or rashes.    Psych:      alert and cooperative; normal mood and affect; normal attention span and concentration.               assessment plan    History of atrial fibrillation and atrial flutter with prior Maze procedure with prior right atrial flutter ablation with recurrence of AF needing cryoablation  Currently in sinus rhythm  Treated sleep apnea  Hypertension optimized  Hyperlipidemia under statins  Coronary artery disease stable  Patient's left atrial appendage patent  Prior history of PVCs  Diabetes followed by primary care physician     Latest INR 2.1    Refill for metoprolol given    Vertigo with tinnitus---ENT referral given      ECG 12 Lead    Date/Time: 8/25/2022 1:50 PM  Performed by: Louie Rios MD  Authorized by: Louie Rios MD   Comparison: compared with previous ECG   Similar to previous ECG  Rhythm: sinus rhythm  Ectopy: atrial premature contractions  Rate: normal  Conduction: conduction normal  Other findings: non-specific ST-T wave changes            Electronically signed by Louie Rios MD, 08/25/22, 1:50 PM EDT.

## 2022-08-26 ENCOUNTER — TELEPHONE (OUTPATIENT)
Dept: CARDIOLOGY | Facility: CLINIC | Age: 72
End: 2022-08-26

## 2022-08-26 ENCOUNTER — ANTICOAGULATION VISIT (OUTPATIENT)
Dept: CARDIOLOGY | Facility: CLINIC | Age: 72
End: 2022-08-26

## 2022-08-26 DIAGNOSIS — I48.21 PERMANENT ATRIAL FIBRILLATION: Primary | ICD-10-CM

## 2022-08-26 DIAGNOSIS — Z79.01 LONG TERM (CURRENT) USE OF ANTICOAGULANTS: ICD-10-CM

## 2022-08-26 LAB — INR PPP: 2.2

## 2022-08-26 NOTE — TELEPHONE ENCOUNTER
Caller: Mary Deleon    Relationship: Self    Best call back number: [phone]    What is the best time to reach you: ANYTIME TODAY OR Monday     Who are you requesting to speak with (clinical staff, provider,  specific staff member): SALMA CHRISTOPHER NURSE     What was the call regarding: PATIENT HAD LABS AND WANTED TO PROVIDE RESULTS FROM BLOOD WORK.    PT. 6.1  INR. 2.2    WOULD ALSO LIKE TO KNOW IF ORDER CAN BE SENT TO DR. MACEDO FOR YEARLY LAB WORK FOR December.     Do you require a callback: UNLESS NEEDS MORE INFORMATION

## 2022-08-26 NOTE — TELEPHONE ENCOUNTER
Pt stated she was told at her last visit that she needed to have all of her labs drawn before next visit. She has a medicare physical in December and she states Dr. Issa wanted clarification on what labs we would like for her to have drawn.       Please advise

## 2022-08-26 NOTE — TELEPHONE ENCOUNTER
Called spoke with patient her INR was 2.2. Her A1C was 6.1. Called requested copy of lab From Dr. Issa's office. Molly from PCP office will fax result apLPN

## 2022-09-19 ENCOUNTER — TELEPHONE (OUTPATIENT)
Dept: CARDIOLOGY | Facility: CLINIC | Age: 72
End: 2022-09-19

## 2022-09-19 RX ORDER — ATORVASTATIN CALCIUM 40 MG/1
40 TABLET, FILM COATED ORAL
Qty: 90 TABLET | Refills: 3 | Status: SHIPPED | OUTPATIENT
Start: 2022-09-19

## 2022-09-19 NOTE — TELEPHONE ENCOUNTER
Incoming Refill Request      Medication requested (name and dose): atorvastatin 40 mg    Pharmacy where request should be sent: [facility] [place]    Additional details provided by patient:     Best call back number: [phone]    Does the patient have less than a 3 day supply:  [x] Yes  [] No    Chacha Ross Rep  09/19/22, 13:29 EDT

## 2022-09-19 NOTE — TELEPHONE ENCOUNTER
Rx Refill Note  Requested Prescriptions     Signed Prescriptions Disp Refills   • atorvastatin (LIPITOR) 40 MG tablet 90 tablet 3     Sig: Take 1 tablet by mouth every night at bedtime.     Authorizing Provider: MACIE CHRISTOPHER     Ordering User: CHIDI CABELLO      Last office visit with prescribing clinician: 7/12/2022      Next office visit with prescribing clinician: Visit date not found            Chidi Cabello MA  09/19/22, 13:38 EDT

## 2022-10-20 LAB — INR PPP: 1.7

## 2022-10-21 ENCOUNTER — ANTICOAGULATION VISIT (OUTPATIENT)
Dept: CARDIOLOGY | Facility: CLINIC | Age: 72
End: 2022-10-21

## 2022-10-21 DIAGNOSIS — I48.21 PERMANENT ATRIAL FIBRILLATION: Primary | ICD-10-CM

## 2022-10-21 DIAGNOSIS — Z79.01 LONG TERM (CURRENT) USE OF ANTICOAGULANTS: ICD-10-CM

## 2022-10-21 NOTE — PROGRESS NOTES
Spoke to patient, no diet changes. She recently finished 2 to 3 weeks of antibiotics for UTI. She is also scheduled for eye surgery this week. She is not required to hold warfarin for this procedure. Will CPM and recheck 1 month. She would prefer to be a little low due to upcoming procedure.

## 2022-12-15 ENCOUNTER — ANTICOAGULATION VISIT (OUTPATIENT)
Dept: CARDIOLOGY | Facility: CLINIC | Age: 72
End: 2022-12-15

## 2022-12-15 DIAGNOSIS — Z79.01 LONG TERM (CURRENT) USE OF ANTICOAGULANTS: ICD-10-CM

## 2022-12-15 DIAGNOSIS — I48.21 PERMANENT ATRIAL FIBRILLATION: Primary | ICD-10-CM

## 2022-12-15 LAB — INR PPP: 1.4

## 2023-01-12 ENCOUNTER — ANTICOAGULATION VISIT (OUTPATIENT)
Dept: CARDIOLOGY | Facility: CLINIC | Age: 73
End: 2023-01-12
Payer: MEDICARE

## 2023-01-12 DIAGNOSIS — Z79.01 LONG TERM (CURRENT) USE OF ANTICOAGULANTS: ICD-10-CM

## 2023-01-12 DIAGNOSIS — I48.21 PERMANENT ATRIAL FIBRILLATION: Primary | ICD-10-CM

## 2023-01-12 PROBLEM — Z98.890 HISTORY OF CARDIOVASCULAR SURGERY: Status: ACTIVE | Noted: 2020-09-25

## 2023-01-12 PROBLEM — M06.9 RHEUMATOID ARTHRITIS (HCC): Status: ACTIVE | Noted: 2019-04-01

## 2023-01-12 PROBLEM — E03.9 HYPOTHYROIDISM: Status: ACTIVE | Noted: 2020-07-10

## 2023-01-12 LAB — INR PPP: 2.1

## 2023-01-12 NOTE — PROGRESS NOTES
Encounter Date:01/17/2023      Patient ID: Mary Deleon is a 72 y.o. female.    No chief complaint on file.         History of Present Illness    72-year-old with past medical history of coronary artery disease status post CABG in 2017 with maze procedure, hypertension, hyperlipidemia, atrial flutter/fibrillation status post ablation x2 with Dr. Rios who presents for follow-up.  She continues to do well.  She has been working on lifestyle modifications to control her diabetes and has decreased her sugar intake.  Denies any chest pain or shortness of breath.  Feels that her chest pain has really come under control since she was started on ISDN.  Remains active with no complaints.    EKG in clinic today shows normal sinus rhythm with sinus arrhythmia and occasional PVCs.  Compared to previous EKG PVCs are now present.  Heart rate is 65 bpm.    Cardiac cath in January 2021  3. CORONARY ANGIOGRAPHY:   Dominance codominant                Left Main: Large-caliber vessel bifurcates into LAD circumflex artery 0% stenosis0                LAD: Large-caliber vessel has proximal 70 to 80% disease gives rise to couple of diagonal arteries and that did not quite reach the apex at the level of the diagonal artery  Both the LAD and diagonal artery have a 70 to 80% stenosis  The LIMA to LAD was open supplying a very distal LAD which is a small caliber vessel and it was not supplying any diagonal arteries                CIRC: Large caliber codominant artery gives rise to marginal and large caliber lateral branch  The ostial marginal branch has 70% stenosis the vein graft marginal branch was occluded  Mid to distal marginal branch is severely diseased  After the marginal branch circumflex artery 50% stenosis lateral branches have no significant disease                RCA: Proximal RCA up to 70% stenosis there is a vein graft that was open supplying the PDA which is a large-caliber vessel     SUMMARY: 2 out of 3 grafts were  open  The vein graft to the marginal branch was occluded  LIMA to LAD was open but flow to the diagonal arteries were compromised  Films reviewed with interventional cardiology Dr. Griffin  RECOMMENDATIONS: We will try aggressive medical treatment    The following portions of the patient's history were reviewed and updated as appropriate: allergies, current medications, past family history, past medical history, past social history, past surgical history, and problem list.    Review of Systems   Constitutional: Negative for malaise/fatigue.   Cardiovascular: Positive for leg swelling. Negative for chest pain, dyspnea on exertion and palpitations.   Respiratory: Negative for cough and shortness of breath.    Gastrointestinal: Negative for abdominal pain, nausea and vomiting.   Neurological: Negative for dizziness, focal weakness, headaches, light-headedness and numbness.   All other systems reviewed and are negative.        Current Outpatient Medications:   •  amLODIPine (NORVASC) 5 MG tablet, Take 1 tablet by mouth Daily., Disp: 90 tablet, Rfl: 3  •  aspirin 81 MG EC tablet, Take 81 mg by mouth Daily., Disp: , Rfl:   •  atorvastatin (LIPITOR) 40 MG tablet, Take 1 tablet by mouth every night at bedtime., Disp: 90 tablet, Rfl: 3  •  BREO ELLIPTA 100-25 MCG/INH inhaler, Inhale 1 puff Daily., Disp: , Rfl: 5  •  cetirizine (zyrTEC) 5 MG tablet, Take 10 mg by mouth Daily., Disp: , Rfl:   •  Docusate Sodium (COLACE PO), Take 2 capsules by mouth 2 (two) times a day., Disp: , Rfl:   •  fosfomycin (MONUROL) 3 g pack, Take  by mouth 1 (One) Time., Disp: , Rfl:   •  isosorbide dinitrate (ISORDIL) 10 MG tablet, Take 1 tablet by mouth 3 (Three) Times a Day., Disp: 270 tablet, Rfl: 3  •  levothyroxine (SYNTHROID, LEVOTHROID) 25 MCG tablet, Take 25 mcg by mouth Daily., Disp: , Rfl:   •  metFORMIN (GLUCOPHAGE) 500 MG tablet, Take 500 mg by mouth 2 (Two) Times a Day., Disp: , Rfl: 1  •  metoprolol succinate XL (TOPROL-XL) 25 MG 24 hr  "tablet, Take 1 tablet by mouth once daily, Disp: 90 tablet, Rfl: 1  •  montelukast (SINGULAIR) 10 MG tablet, Take 10 mg by mouth Daily., Disp: , Rfl:   •  Omega-3 Fatty Acids (fish oil) 500 MG capsule capsule, Take 500 mg by mouth Daily With Breakfast., Disp: , Rfl:   •  ONETOUCH DELICA LANCETS FINE misc, USE TO CHECK GLUCOSE TWICE DAILY, Disp: , Rfl: 5  •  ONETOUCH VERIO test strip, USE TO CHECK GLUCOSE TWICE DAILY, Disp: , Rfl: 3  •  Probiotic Product (PROBIOTIC-10 PO), Take  by mouth., Disp: , Rfl:   •  SPIRIVA RESPIMAT 1.25 MCG/ACT aerosol solution inhaler, Inhale 2 puffs Daily., Disp: , Rfl: 1  •  warfarin (COUMADIN) 3 MG tablet, Take 3 mg by mouth 1 (One) Time Per Week. Tues, Disp: , Rfl:   •  warfarin (COUMADIN) 4 MG tablet, Take one tablet by mouth daily except Tuesday or as directed, Disp: 90 tablet, Rfl: 0    Allergies   Allergen Reactions   • Omeprazole Hives   • Codeine GI Intolerance   • Nitrofurantoin Other (See Comments)   • Sulfa Antibiotics Hives   • Tylenol With Codeine #3 [Acetaminophen-Codeine] GI Intolerance   • Vancomycin Other (See Comments)     \"red man syndrome\" - turned red from head to toe   • Naproxen Rash   • Naproxen Sodium Rash       Family History   Problem Relation Age of Onset   • Heart disease Mother    • Pneumonia Mother    • Asthma Father    • Alzheimer's disease Father    • Heart disease Father        Past Surgical History:   Procedure Laterality Date   • CARDIAC CATHETERIZATION N/A 1/11/2021    Procedure: Coronary angiography;  Surgeon: Al Garcia MD;  Location: [facility] CATH INVASIVE LOCATION;  Service: Cardiovascular;  Laterality: N/A;   • CARDIAC CATHETERIZATION N/A 1/11/2021    Procedure: Saphenous Vein Graft;  Surgeon: Al Garcia MD;  Location: [facility] CATH INVASIVE LOCATION;  Service: Cardiovascular;  Laterality: N/A;   • HYSTERECTOMY     • KNEE SURGERY         Past Medical History:   Diagnosis Date   • Arthritis    • Atrial fibrillation " (HCC)    • COPD (chronic obstructive pulmonary disease) (HCC)    • Coronary artery disease    • Depression    • Diverticulitis    • Hyperlipidemia    • Hypertension    • Mitral valve prolapse        Social History     Socioeconomic History   • Marital status:    Tobacco Use   • Smoking status: Passive Smoke Exposure - Never Smoker   • Smokeless tobacco: Never   Vaping Use   • Vaping Use: Never used   Substance and Sexual Activity   • Alcohol use: No   • Drug use: No   • Sexual activity: Defer         Procedures      Objective:       Physical Exam    LMP  (LMP Unknown)   The patient is alert, oriented and in no distress.    Vital signs as noted above.    Head and neck revealed no carotid bruits or jugular venous distension.  No thyromegaly or lymphadenopathy is present.    Lungs clear.  No wheezing.  Breath sounds are normal bilaterally.    Heart normal first and second heart sounds.  No murmur..  No pericardial rub is present.  No gallop is present.    Abdomen soft and nontender.  No organomegaly is present.    Extremities revealed good peripheral pulses without any pedal edema.    Skin warm and dry.    Musculoskeletal system is grossly normal.    CNS grossly normal.           Diagnosis Plan   1. Coronary artery disease involving native coronary artery of native heart without angina pectoris        2. Dyslipidemia        3. Atrial flutter, unspecified type (HCC)        4. Permanent atrial fibrillation (HCC)        5. Type 2 diabetes mellitus without complication, without long-term current use of insulin (HCC)        6. Essential hypertension        LAB RESULTS (LAST 7 DAYS)    CBC        BMP        CMP         BNP        TROPONIN        CoAg  Results from last 7 days   Lab Units 01/12/23  0000   INR  2.10       Creatinine Clearance  CrCl cannot be calculated (Patient's most recent lab result is older than the maximum 30 days allowed.).    ABG        Radiology  No radiology results for the last day          Assessment and Plan       Diagnoses and all orders for this visit:    1. Coronary artery disease involving native coronary artery of native heart without angina pectoris (Primary)    2. Dyslipidemia    3. Atrial flutter, unspecified type (HCC)    4. Permanent atrial fibrillation (HCC)    5. Type 2 diabetes mellitus without complication, without long-term current use of insulin (HCC)    6. Essential hypertension       Atrial fibrillation  Permanent  Status post maze  Currently on warfarin and metoprolol    Coronary artery disease  Multivessel native CAD  Patent LIMA to LAD and SVG to RCA  Continue with aspirin    Hypertension  Currently on amlodipine, isosorbide dinitrate, metoprolol succinate  We discussed possibly starting lisinopril given her history of diabetes however she would not like to make any medication changes right now because she has been feeling so well on the current regimen.    Hyperlipidemia  Atorvastatin 40 mg      Georgia Grey MD

## 2023-01-17 ENCOUNTER — OFFICE VISIT (OUTPATIENT)
Dept: CARDIOLOGY | Facility: CLINIC | Age: 73
End: 2023-01-17
Payer: MEDICARE

## 2023-01-17 VITALS
HEIGHT: 63 IN | SYSTOLIC BLOOD PRESSURE: 129 MMHG | DIASTOLIC BLOOD PRESSURE: 71 MMHG | BODY MASS INDEX: 32.96 KG/M2 | OXYGEN SATURATION: 96 % | WEIGHT: 186 LBS

## 2023-01-17 DIAGNOSIS — I48.92 ATRIAL FLUTTER, UNSPECIFIED TYPE: ICD-10-CM

## 2023-01-17 DIAGNOSIS — E78.5 DYSLIPIDEMIA: ICD-10-CM

## 2023-01-17 DIAGNOSIS — I10 ESSENTIAL HYPERTENSION: ICD-10-CM

## 2023-01-17 DIAGNOSIS — E11.9 TYPE 2 DIABETES MELLITUS WITHOUT COMPLICATION, WITHOUT LONG-TERM CURRENT USE OF INSULIN: ICD-10-CM

## 2023-01-17 DIAGNOSIS — I25.10 CORONARY ARTERY DISEASE INVOLVING NATIVE CORONARY ARTERY OF NATIVE HEART WITHOUT ANGINA PECTORIS: Primary | ICD-10-CM

## 2023-01-17 DIAGNOSIS — I48.21 PERMANENT ATRIAL FIBRILLATION: ICD-10-CM

## 2023-01-17 PROCEDURE — 99214 OFFICE O/P EST MOD 30 MIN: CPT | Performed by: INTERNAL MEDICINE

## 2023-01-24 RX ORDER — AMLODIPINE BESYLATE 5 MG/1
TABLET ORAL
Qty: 90 TABLET | Refills: 0 | Status: SHIPPED | OUTPATIENT
Start: 2023-01-24

## 2023-01-24 NOTE — TELEPHONE ENCOUNTER
Rx Refill Note  Requested Prescriptions     Pending Prescriptions Disp Refills   • amLODIPine (NORVASC) 5 MG tablet [Pharmacy Med Name: amLODIPine Besylate 5 MG Oral Tablet] 90 tablet 0     Sig: Take 1 tablet by mouth once daily      Last office visit with prescribing clinician: 7/12/2022   Last telemedicine visit with prescribing clinician: Visit date not found   Next office visit with prescribing clinician: Visit date not found                         Would you like a call back once the refill request has been completed: [] Yes [] No    If the office needs to give you a call back, can they leave a voicemail: [] Yes [] No    Eliane Carcamo MA  01/24/23, 14:49 EST

## 2023-02-08 ENCOUNTER — ANTICOAGULATION VISIT (OUTPATIENT)
Dept: CARDIOLOGY | Facility: CLINIC | Age: 73
End: 2023-02-08
Payer: MEDICARE

## 2023-02-08 DIAGNOSIS — I48.21 PERMANENT ATRIAL FIBRILLATION: Primary | ICD-10-CM

## 2023-02-08 DIAGNOSIS — Z79.01 LONG TERM (CURRENT) USE OF ANTICOAGULANTS: ICD-10-CM

## 2023-02-08 LAB — INR PPP: 1.8

## 2023-03-20 ENCOUNTER — TELEPHONE (OUTPATIENT)
Dept: CARDIOLOGY | Facility: CLINIC | Age: 73
End: 2023-03-20
Payer: MEDICARE

## 2023-03-20 NOTE — TELEPHONE ENCOUNTER
Caller: Mary Deleon    Relationship: Self    Best call back number: [phone]    What was the call regarding: PROCEDURE     Do you require a callback: YES    PT IS HAVING ORAL SURGERY DUE TO A TOOTH NEEDING TO BE REMOVED. THE PROVIDER PREFORMING THE PROCEDURE WOULD LIKE TO KNOW IF PT NEEDS TO BE PREMEDICATED AND IF SHE NEEDS TO STOP HER WARFRIN BEFORE THE PROCEDURE.

## 2023-03-21 ENCOUNTER — TELEPHONE (OUTPATIENT)
Dept: CARDIOLOGY | Facility: CLINIC | Age: 73
End: 2023-03-21
Payer: MEDICARE

## 2023-03-21 NOTE — TELEPHONE ENCOUNTER
Georgia Grey MD 38 minutes ago (9:56 AM)     She can hold warfarin and no pre medication needed

## 2023-03-23 NOTE — TELEPHONE ENCOUNTER
Oral & Facial Surgery  Dr. Darling  Teeth extraction  Schedule TBD  Phone# [phone]  Fax# [phone]        Placed on Dr. Grey's MA desk

## 2023-03-27 ENCOUNTER — OFFICE VISIT (OUTPATIENT)
Dept: CARDIOLOGY | Facility: CLINIC | Age: 73
End: 2023-03-27
Payer: MEDICARE

## 2023-03-27 VITALS
DIASTOLIC BLOOD PRESSURE: 70 MMHG | WEIGHT: 187 LBS | OXYGEN SATURATION: 98 % | HEART RATE: 63 BPM | HEIGHT: 63 IN | SYSTOLIC BLOOD PRESSURE: 114 MMHG | BODY MASS INDEX: 33.13 KG/M2

## 2023-03-27 DIAGNOSIS — I25.110 CORONARY ARTERY DISEASE INVOLVING NATIVE CORONARY ARTERY OF NATIVE HEART WITH UNSTABLE ANGINA PECTORIS: ICD-10-CM

## 2023-03-27 DIAGNOSIS — I48.0 PAROXYSMAL ATRIAL FIBRILLATION: Primary | ICD-10-CM

## 2023-03-27 DIAGNOSIS — I10 ESSENTIAL HYPERTENSION: ICD-10-CM

## 2023-03-27 DIAGNOSIS — E78.2 MIXED HYPERLIPIDEMIA: ICD-10-CM

## 2023-03-27 PROBLEM — L03.90 CELLULITIS: Status: RESOLVED | Noted: 2019-02-18 | Resolved: 2023-03-27

## 2023-03-27 PROBLEM — M25.539 PAIN IN WRIST: Status: RESOLVED | Noted: 2020-02-04 | Resolved: 2023-03-27

## 2023-03-27 PROBLEM — M79.641 PAIN IN RIGHT HAND: Status: RESOLVED | Noted: 2020-02-04 | Resolved: 2023-03-27

## 2023-03-27 PROBLEM — J44.1 ACUTE EXACERBATION OF CHRONIC OBSTRUCTIVE AIRWAYS DISEASE: Status: RESOLVED | Noted: 2020-02-04 | Resolved: 2023-03-27

## 2023-03-27 PROBLEM — R05.9 COUGH: Status: RESOLVED | Noted: 2020-02-04 | Resolved: 2023-03-27

## 2023-03-27 PROBLEM — R50.9 FEVER: Status: RESOLVED | Noted: 2017-06-27 | Resolved: 2023-03-27

## 2023-03-27 PROCEDURE — 99214 OFFICE O/P EST MOD 30 MIN: CPT | Performed by: INTERNAL MEDICINE

## 2023-03-27 PROCEDURE — 3074F SYST BP LT 130 MM HG: CPT | Performed by: INTERNAL MEDICINE

## 2023-03-27 PROCEDURE — 3078F DIAST BP <80 MM HG: CPT | Performed by: INTERNAL MEDICINE

## 2023-03-27 PROCEDURE — 1160F RVW MEDS BY RX/DR IN RCRD: CPT | Performed by: INTERNAL MEDICINE

## 2023-03-27 PROCEDURE — 1159F MED LIST DOCD IN RCRD: CPT | Performed by: INTERNAL MEDICINE

## 2023-03-27 NOTE — PROGRESS NOTES
CC-Atrial fibrillation, sleep apnea         Sub--72-year-old pleasant patient well-known to me comes in for follow-up.  Patient had prior history of bradycardia post cardioversion and she was on digoxin and amiodarone which have been stopped.  Patient has known history of coronary artery disease with prior bypass surgery with concomitant Maze procedure.  She has obstructive sleep apnea with CPAP therapy and history of rheumatoid arthritis.  Prior cardiac catheterization showed 2 without 3 grafts patent with vein graft to the marginal being occluded and LIMA to the LAD was patent.  She does have history of hypertension hyperlipidemia.  She had a prior history of atrial flutter and AF ablation done in 2019           Past Medical History:     Reviewed history from 10/18/2018 and no changes required:        Hypertension        Dyslipidemia        Coronary Artery Disease (05/25/2017)        C O P D        Chronic-Atrial Fibrillation        S/P CABG x3 Vessels        Atrial Flutter     Past Surgical History:     Reviewed history from 05/30/2019 and no changes required:        Cardiac Cath 2003  with narrowing distal LAD and prox Cx.         Hyster and BSO        Torn miniscus on left knee x2        Heart Catherization (05/24/2017)        C A B G: x3 Vessels, LIMA to LAD, SVG to PDA & SVG to OM3 (05/25/2017)        Cardioversion 9/24/18         Ablation 1/2/19 Dr. MAST         Physical Exam    General:      well developed, well nourished, in no acute distress.    Head:      normocephalic and atraumatic.    Eyes:      PERRL/EOM intact, conjunctivae and sclerae clear without nystagmus.    Neck:      no  thyromegaly, trachea central with normal respiratory effort  Lungs:      clear bilaterally to auscultation.    Heart:       regular rate and rhythm, S1, S2 without murmurs, rubs, or gallops  Skin:      intact without lesions or rashes.    Psych:      alert and cooperative; normal mood and affect; normal attention span and  concentration.          assessment plan    Prior history of atrial fibrillation and atrial flutter with prior Maze procedure with prior right atrial flutter ablation with recurrence needing AF cryoablation with resolution.  AF ablation done in April 2019  Currently in sinus rhythm doing well.  Treated sleep apnea  Essential hypertension optimized  Coronary artery disease stable  Left atrial appendage patent  Diabetes followed by primary care physician  Medications reviewed and follow-up appointments made  Most recent INR 1.8        Electronically signed by Louie Rios MD, 03/27/23, 12:07 PM EDT.

## 2023-04-06 ENCOUNTER — TELEPHONE (OUTPATIENT)
Dept: CARDIOLOGY | Facility: CLINIC | Age: 73
End: 2023-04-06
Payer: MEDICARE

## 2023-04-06 ENCOUNTER — ANTICOAGULATION VISIT (OUTPATIENT)
Dept: CARDIOLOGY | Facility: CLINIC | Age: 73
End: 2023-04-06
Payer: MEDICARE

## 2023-04-06 DIAGNOSIS — I48.21 PERMANENT ATRIAL FIBRILLATION: Primary | ICD-10-CM

## 2023-04-06 DIAGNOSIS — Z79.01 LONG TERM (CURRENT) USE OF ANTICOAGULANTS: ICD-10-CM

## 2023-04-06 LAB — INR PPP: 1.9

## 2023-04-06 NOTE — TELEPHONE ENCOUNTER
Placed a call to patient regardng her PT/INR being overdue. Patient stated she had her PT/INR checked at her PCP-Dr. Issa's office in [place] on 3/22/23.  Informed patient that we did not receive her PT/INR results and that I will call Dr. Issa's office and have them fax us the results.  Patient verbalizes understanding.  Placed a call to Dr. Issa's office and updated our fax# with them and they will fax patient's PT/INR. RADHA SALAZAR

## 2023-04-28 ENCOUNTER — ANTICOAGULATION VISIT (OUTPATIENT)
Dept: CARDIOLOGY | Facility: CLINIC | Age: 73
End: 2023-04-28
Payer: MEDICARE

## 2023-04-28 DIAGNOSIS — Z79.01 LONG TERM (CURRENT) USE OF ANTICOAGULANTS: ICD-10-CM

## 2023-04-28 DIAGNOSIS — I48.21 PERMANENT ATRIAL FIBRILLATION: Primary | ICD-10-CM

## 2023-04-28 LAB — INR PPP: 1.4

## 2023-04-28 NOTE — PROGRESS NOTES
Spoke to patient, she has been eating differently, more salads. Advised her to take an extra 4 mg tablet today and recheck in 3 weeks.

## 2023-05-04 RX ORDER — AMLODIPINE BESYLATE 5 MG/1
TABLET ORAL
Qty: 90 TABLET | Refills: 3 | Status: SHIPPED | OUTPATIENT
Start: 2023-05-04

## 2023-05-04 RX ORDER — METOPROLOL SUCCINATE 25 MG/1
TABLET, EXTENDED RELEASE ORAL
Qty: 90 TABLET | Refills: 0 | Status: SHIPPED | OUTPATIENT
Start: 2023-05-04

## 2023-05-04 NOTE — TELEPHONE ENCOUNTER
Rx Refill Note  Requested Prescriptions     Pending Prescriptions Disp Refills   • amLODIPine (NORVASC) 5 MG tablet [Pharmacy Med Name: amLODIPine Besylate 5 MG Oral Tablet] 90 tablet 3     Sig: Take 1 tablet by mouth once daily      Last office visit with prescribing clinician: 7/12/2022   Last telemedicine visit with prescribing clinician: Visit date not found   Next office visit with prescribing clinician: Visit date not found                         Would you like a call back once the refill request has been completed: [] Yes [] No    If the office needs to give you a call back, can they leave a voicemail: [] Yes [] No    Eliane Carcamo MA  05/04/23, 14:30 EDT

## 2023-06-13 RX ORDER — ATORVASTATIN CALCIUM 40 MG/1
TABLET, FILM COATED ORAL
Qty: 90 TABLET | Refills: 0 | Status: SHIPPED | OUTPATIENT
Start: 2023-06-13

## 2023-06-13 NOTE — TELEPHONE ENCOUNTER
Rx Refill Note  Requested Prescriptions     Pending Prescriptions Disp Refills    atorvastatin (LIPITOR) 40 MG tablet [Pharmacy Med Name: Atorvastatin Calcium 40 MG Oral Tablet] 90 tablet 0     Sig: TAKE 1 TABLET BY MOUTH EVERY DAY AT BEDTIME      Last office visit with prescribing clinician: 7/12/2022   Last telemedicine visit with prescribing clinician: Visit date not found   Next office visit with prescribing clinician: Visit date not found                         Would you like a call back once the refill request has been completed: [] Yes [] No    If the office needs to give you a call back, can they leave a voicemail: [] Yes [] No    Taty Diaz MA  06/13/23, 14:04 EDT

## 2023-07-20 ENCOUNTER — APPOINTMENT (OUTPATIENT)
Dept: CT IMAGING | Facility: HOSPITAL | Age: 73
End: 2023-07-20
Payer: MEDICARE

## 2023-07-20 ENCOUNTER — HOSPITAL ENCOUNTER (EMERGENCY)
Facility: HOSPITAL | Age: 73
Discharge: HOME | End: 2023-07-20
Attending: EMERGENCY MEDICINE | Admitting: EMERGENCY MEDICINE
Payer: MEDICARE

## 2023-07-20 VITALS
DIASTOLIC BLOOD PRESSURE: 72 MMHG | RESPIRATION RATE: 17 BRPM | OXYGEN SATURATION: 96 % | HEART RATE: 62 BPM | BODY MASS INDEX: 33.66 KG/M2 | SYSTOLIC BLOOD PRESSURE: 118 MMHG | WEIGHT: 190 LBS | HEIGHT: 63 IN | TEMPERATURE: 98.3 F

## 2023-07-20 DIAGNOSIS — K59.00 CONSTIPATION, UNSPECIFIED CONSTIPATION TYPE: ICD-10-CM

## 2023-07-20 DIAGNOSIS — M54.50 ACUTE LOW BACK PAIN WITHOUT SCIATICA, UNSPECIFIED BACK PAIN LATERALITY: Primary | ICD-10-CM

## 2023-07-20 LAB
ALBUMIN SERPL-MCNC: 4.2 G/DL (ref 3.5–5.2)
ALBUMIN/GLOB SERPL: 1.4 G/DL
ALP SERPL-CCNC: 59 U/L (ref 39–117)
ALT SERPL W P-5'-P-CCNC: 13 U/L (ref 1–33)
ANION GAP SERPL CALCULATED.3IONS-SCNC: 12 MMOL/L (ref 5–15)
AST SERPL-CCNC: 23 U/L (ref 1–32)
BASOPHILS # BLD AUTO: 0.1 10*3/MM3 (ref 0–0.2)
BASOPHILS NFR BLD AUTO: 1.1 % (ref 0–1.5)
BILIRUB SERPL-MCNC: 0.5 MG/DL (ref 0–1.2)
BILIRUB UR QL STRIP: NEGATIVE
BUN SERPL-MCNC: 13 MG/DL (ref 8–23)
BUN/CREAT SERPL: 16.7 (ref 7–25)
CALCIUM SPEC-SCNC: 10 MG/DL (ref 8.6–10.5)
CHLORIDE SERPL-SCNC: 98 MMOL/L (ref 98–107)
CLARITY UR: CLEAR
CO2 SERPL-SCNC: 26 MMOL/L (ref 22–29)
COLOR UR: YELLOW
CREAT SERPL-MCNC: 0.78 MG/DL (ref 0.57–1)
DEPRECATED RDW RBC AUTO: 46.8 FL (ref 37–54)
EGFRCR SERPLBLD CKD-EPI 2021: 80.3 ML/MIN/1.73
EOSINOPHIL # BLD AUTO: 0.1 10*3/MM3 (ref 0–0.4)
EOSINOPHIL NFR BLD AUTO: 1 % (ref 0.3–6.2)
ERYTHROCYTE [DISTWIDTH] IN BLOOD BY AUTOMATED COUNT: 15 % (ref 12.3–15.4)
GLOBULIN UR ELPH-MCNC: 2.9 GM/DL
GLUCOSE SERPL-MCNC: 112 MG/DL (ref 65–99)
GLUCOSE UR STRIP-MCNC: NEGATIVE MG/DL
HCT VFR BLD AUTO: 37.1 % (ref 34–46.6)
HGB BLD-MCNC: 12.2 G/DL (ref 12–15.9)
HGB UR QL STRIP.AUTO: NEGATIVE
INR PPP: 1.48 (ref 2–3)
KETONES UR QL STRIP: NEGATIVE
LEUKOCYTE ESTERASE UR QL STRIP.AUTO: NEGATIVE
LIPASE SERPL-CCNC: 18 U/L (ref 13–60)
LYMPHOCYTES # BLD AUTO: 1.4 10*3/MM3 (ref 0.7–3.1)
LYMPHOCYTES NFR BLD AUTO: 14.7 % (ref 19.6–45.3)
MCH RBC QN AUTO: 29.9 PG (ref 26.6–33)
MCHC RBC AUTO-ENTMCNC: 33 G/DL (ref 31.5–35.7)
MCV RBC AUTO: 90.8 FL (ref 79–97)
MONOCYTES # BLD AUTO: 1 10*3/MM3 (ref 0.1–0.9)
MONOCYTES NFR BLD AUTO: 11 % (ref 5–12)
NEUTROPHILS NFR BLD AUTO: 6.7 10*3/MM3 (ref 1.7–7)
NEUTROPHILS NFR BLD AUTO: 72.2 % (ref 42.7–76)
NITRITE UR QL STRIP: NEGATIVE
NRBC BLD AUTO-RTO: 0 /100 WBC (ref 0–0.2)
PH UR STRIP.AUTO: 7 [PH] (ref 5–8)
PLATELET # BLD AUTO: 280 10*3/MM3 (ref 140–450)
PMV BLD AUTO: 7.8 FL (ref 6–12)
POTASSIUM SERPL-SCNC: 4 MMOL/L (ref 3.5–5.2)
PROT SERPL-MCNC: 7.1 G/DL (ref 6–8.5)
PROT UR QL STRIP: NEGATIVE
PROTHROMBIN TIME: 15.5 SECONDS (ref 19.4–28.5)
RBC # BLD AUTO: 4.08 10*6/MM3 (ref 3.77–5.28)
SODIUM SERPL-SCNC: 136 MMOL/L (ref 136–145)
SP GR UR STRIP: 1.01 (ref 1–1.03)
UROBILINOGEN UR QL STRIP: NORMAL
WBC NRBC COR # BLD: 9.3 10*3/MM3 (ref 3.4–10.8)

## 2023-07-20 PROCEDURE — 74176 CT ABD & PELVIS W/O CONTRAST: CPT

## 2023-07-20 PROCEDURE — 99283 EMERGENCY DEPT VISIT LOW MDM: CPT

## 2023-07-20 PROCEDURE — 80053 COMPREHEN METABOLIC PANEL: CPT | Performed by: EMERGENCY MEDICINE

## 2023-07-20 PROCEDURE — 85025 COMPLETE CBC W/AUTO DIFF WBC: CPT | Performed by: EMERGENCY MEDICINE

## 2023-07-20 PROCEDURE — 96376 TX/PRO/DX INJ SAME DRUG ADON: CPT

## 2023-07-20 PROCEDURE — 81003 URINALYSIS AUTO W/O SCOPE: CPT | Performed by: EMERGENCY MEDICINE

## 2023-07-20 PROCEDURE — 25010000002 HYDROMORPHONE 1 MG/ML SOLUTION: Performed by: EMERGENCY MEDICINE

## 2023-07-20 PROCEDURE — 96375 TX/PRO/DX INJ NEW DRUG ADDON: CPT

## 2023-07-20 PROCEDURE — 25010000002 ONDANSETRON PER 1 MG: Performed by: EMERGENCY MEDICINE

## 2023-07-20 PROCEDURE — 83690 ASSAY OF LIPASE: CPT | Performed by: EMERGENCY MEDICINE

## 2023-07-20 PROCEDURE — 96374 THER/PROPH/DIAG INJ IV PUSH: CPT

## 2023-07-20 PROCEDURE — 85610 PROTHROMBIN TIME: CPT | Performed by: EMERGENCY MEDICINE

## 2023-07-20 RX ORDER — ONDANSETRON 2 MG/ML
4 INJECTION INTRAMUSCULAR; INTRAVENOUS ONCE
Status: COMPLETED | OUTPATIENT
Start: 2023-07-20 | End: 2023-07-20

## 2023-07-20 RX ORDER — SODIUM CHLORIDE 0.9 % (FLUSH) 0.9 %
10 SYRINGE (ML) INJECTION AS NEEDED
Status: DISCONTINUED | OUTPATIENT
Start: 2023-07-20 | End: 2023-07-20 | Stop reason: HOSPADM

## 2023-07-20 RX ADMIN — ONDANSETRON 4 MG: 2 INJECTION INTRAMUSCULAR; INTRAVENOUS at 17:35

## 2023-07-20 RX ADMIN — HYDROMORPHONE HYDROCHLORIDE 0.5 MG: 1 INJECTION, SOLUTION INTRAMUSCULAR; INTRAVENOUS; SUBCUTANEOUS at 17:35

## 2023-07-20 RX ADMIN — ONDANSETRON 4 MG: 2 INJECTION INTRAMUSCULAR; INTRAVENOUS at 16:45

## 2023-07-20 NOTE — ED PROVIDER NOTES
"Subjective   History of Present Illness  72-year-old female complains of low back pain left greater than right that she attributes to turning and twisting her torso at the grocery store on Sunday and had some abrupt onset of low back pain.  States she has had a remote history of low back problems.  She reports no new numbness or weakness.  Secondary issue is that of constipation stating that she has not had a good bowel movement last 5 days and has some abdominal discomfort along with an episode of vomiting today.  She reports no fevers or chills.  States she does take stool softeners daily but even these have offered no BM in the last few days.  She reports no dysuria or incontinence or urinary retention.  Review of Systems    Past Medical History:   Diagnosis Date    Arthritis     Atrial fibrillation     COPD (chronic obstructive pulmonary disease)     Coronary artery disease     Depression     Diverticulitis     Hyperlipidemia     Hypertension     Mitral valve prolapse        Allergies   Allergen Reactions    Omeprazole Hives    Codeine GI Intolerance    Nitrofurantoin Other (See Comments)    Sulfa Antibiotics Hives    Tylenol With Codeine #3 [Acetaminophen-Codeine] GI Intolerance    Vancomycin Other (See Comments)     \"red man syndrome\" - turned red from head to toe    Naproxen Rash    Naproxen Sodium Rash       Past Surgical History:   Procedure Laterality Date    CARDIAC CATHETERIZATION N/A 01/11/2021    Procedure: Coronary angiography;  Surgeon: Al Garcia MD;  Location: [facility] CATH INVASIVE LOCATION;  Service: Cardiovascular;  Laterality: N/A;    CARDIAC CATHETERIZATION N/A 01/11/2021    Procedure: Saphenous Vein Graft;  Surgeon: Al Garcia MD;  Location: [facility] CATH INVASIVE LOCATION;  Service: Cardiovascular;  Laterality: N/A;    CATARACT EXTRACTION, BILATERAL Bilateral     HYSTERECTOMY      KNEE SURGERY         Family History   Problem Relation Age of Onset    Heart " disease Mother     Pneumonia Mother     Asthma Father     Alzheimer's disease Father     Heart disease Father        Social History     Socioeconomic History    Marital status:    Tobacco Use    Smoking status: Never     Passive exposure: Yes    Smokeless tobacco: Never   Vaping Use    Vaping Use: Never used   Substance and Sexual Activity    Alcohol use: No    Drug use: No    Sexual activity: Defer     Prior to Admission medications    Medication Sig Start Date End Date Taking? Authorizing Provider   amLODIPine (NORVASC) 5 MG tablet Take 1 tablet by mouth once daily 5/4/23   Georgia Grey MD   aspirin 81 MG EC tablet Take 1 tablet by mouth Daily.    Dolores Benitez MD   atorvastatin (LIPITOR) 40 MG tablet TAKE 1 TABLET BY MOUTH EVERY DAY AT BEDTIME 6/13/23   Jeronimo Najera MD   BREO ELLIPTA 100-25 MCG/INH inhaler Inhale 1 puff Daily. 7/10/19   Dolores Benitez MD   cetirizine (zyrTEC) 5 MG tablet Take 2 tablets by mouth Daily.    Dolores Benitez MD   Docusate Sodium (COLACE PO) Take 2 capsules by mouth 2 (two) times a day. 12/7/17   Dolores Benitez MD   fosfomycin (MONUROL) 3 g pack Take  by mouth 1 (One) Time.    Dolores Benitez MD   isosorbide dinitrate (ISORDIL) 10 MG tablet TAKE 1 TABLET BY MOUTH THREE TIMES DAILY 6/21/23   Georgia Grey MD   levothyroxine (SYNTHROID, LEVOTHROID) 25 MCG tablet Take 1 tablet by mouth Daily.    Dolores Benitez MD   metFORMIN (GLUCOPHAGE) 500 MG tablet Take 1 tablet by mouth 2 (Two) Times a Day. 6/14/19   Dolores Benitez MD   metoprolol succinate XL (TOPROL-XL) 25 MG 24 hr tablet Take 1 tablet by mouth once daily 5/4/23   Louie Rios MD   montelukast (SINGULAIR) 10 MG tablet Take 1 tablet by mouth Daily. 9/1/20   Dolores Benitez MD   Omega-3 Fatty Acids (fish oil) 500 MG capsule capsule Take 1 capsule by mouth Daily With Breakfast.    Provider, Historical, MD   ONETOUCH DELICA LANCETS FINE misc USE TO CHECK  "GLUCOSE TWICE DAILY 6/8/19   Dolores Benitez MD   ONETOUCH VERIO test strip USE TO CHECK GLUCOSE TWICE DAILY 6/27/19   Dolores Benitez MD   Probiotic Product (PROBIOTIC-10 PO) Take  by mouth.    Dolores Benitez MD   SPIRIVA RESPIMAT 1.25 MCG/ACT aerosol solution inhaler Inhale 2 puffs Daily. 7/19/19   Dolores Benitez MD   vitamin D3 125 MCG (5000 UT) capsule capsule Take 1 capsule by mouth Daily. 12/24/22   Dolores Benitez MD   warfarin (COUMADIN) 3 MG tablet Take 1 tablet by mouth 1 (One) Time Per Week. Tu    Emergency, Nurse Cuca, RN   warfarin (COUMADIN) 4 MG tablet Take one tablet by mouth daily except Tuesday or as directed 2/26/21   Al Garcia MD     /58   Pulse 71   Temp 98.3 °F (36.8 °C) (Oral)   Resp 17   Ht 160 cm (63\")   Wt 86.2 kg (190 lb)   LMP  (LMP Unknown)   SpO2 95%   BMI 33.66 kg/m²         Objective   Physical Exam  General: Well-developed well-appearing, no acute distress, alert and appropriate  Eyes:  sclera nonicteric  HEENT: Mucous membranes moist, no mucosal swelling  Neck: Supple, no nuchal rigidity,   Respirations: Respirations nonlabored, equal breath sounds bilaterally, clear lungs  Heart regular rate and rhythm, no murmurs rubs or gallops,   Abdomen soft, mildly tender palpation in the mid abdomen, no rebound or guarding, nondistended, no hepatosplenomegaly, no hernia, no mass, normal bowel sounds, no CVA tenderness  Back: Some tenderness palpation across lower lumbar region of the back, no step-off or crepitus, no soft tissue swelling  Extremities no clubbing cyanosis or edema, calves are symmetric and nontender, normal pulses in the bilateral lower extremities  Neuro cranial nerves grossly intact, normal sensory and motor function in the bilateral lower extremities  Psych oriented, pleasant affect  Skin no rash, brisk cap refill  Procedures           ED Course      Results for orders placed or performed during the hospital " encounter of 07/20/23   Comprehensive Metabolic Panel    Specimen: Blood   Result Value Ref Range    Glucose 112 (H) 65 - 99 mg/dL    BUN 13 8 - 23 mg/dL    Creatinine 0.78 0.57 - 1.00 mg/dL    Sodium 136 136 - 145 mmol/L    Potassium 4.0 3.5 - 5.2 mmol/L    Chloride 98 98 - 107 mmol/L    CO2 26.0 22.0 - 29.0 mmol/L    Calcium 10.0 8.6 - 10.5 mg/dL    Total Protein 7.1 6.0 - 8.5 g/dL    Albumin 4.2 3.5 - 5.2 g/dL    ALT (SGPT) 13 1 - 33 U/L    AST (SGOT) 23 1 - 32 U/L    Alkaline Phosphatase 59 39 - 117 U/L    Total Bilirubin 0.5 0.0 - 1.2 mg/dL    Globulin 2.9 gm/dL    A/G Ratio 1.4 g/dL    BUN/Creatinine Ratio 16.7 7.0 - 25.0    Anion Gap 12.0 5.0 - 15.0 mmol/L    eGFR 80.3 >60.0 mL/min/1.73   Lipase    Specimen: Blood   Result Value Ref Range    Lipase 18 13 - 60 U/L   Urinalysis With Culture If Indicated - Urine, Clean Catch    Specimen: Urine, Clean Catch   Result Value Ref Range    Color, UA Yellow Yellow, Straw    Appearance, UA Clear Clear    pH, UA 7.0 5.0 - 8.0    Specific Gravity, UA 1.007 1.005 - 1.030    Glucose, UA Negative Negative    Ketones, UA Negative Negative    Bilirubin, UA Negative Negative    Blood, UA Negative Negative    Protein, UA Negative Negative    Leuk Esterase, UA Negative Negative    Nitrite, UA Negative Negative    Urobilinogen, UA 0.2 E.U./dL 0.2 - 1.0 E.U./dL   Protime-INR    Specimen: Blood   Result Value Ref Range    Protime 15.5 (L) 19.4 - 28.5 Seconds    INR 1.48 (L) 2.00 - 3.00   CBC Auto Differential    Specimen: Blood   Result Value Ref Range    WBC 9.30 3.40 - 10.80 10*3/mm3    RBC 4.08 3.77 - 5.28 10*6/mm3    Hemoglobin 12.2 12.0 - 15.9 g/dL    Hematocrit 37.1 34.0 - 46.6 %    MCV 90.8 79.0 - 97.0 fL    MCH 29.9 26.6 - 33.0 pg    MCHC 33.0 31.5 - 35.7 g/dL    RDW 15.0 12.3 - 15.4 %    RDW-SD 46.8 37.0 - 54.0 fl    MPV 7.8 6.0 - 12.0 fL    Platelets 280 140 - 450 10*3/mm3    Neutrophil % 72.2 42.7 - 76.0 %    Lymphocyte % 14.7 (L) 19.6 - 45.3 %    Monocyte % 11.0 5.0 -  12.0 %    Eosinophil % 1.0 0.3 - 6.2 %    Basophil % 1.1 0.0 - 1.5 %    Neutrophils, Absolute 6.70 1.70 - 7.00 10*3/mm3    Lymphocytes, Absolute 1.40 0.70 - 3.10 10*3/mm3    Monocytes, Absolute 1.00 (H) 0.10 - 0.90 10*3/mm3    Eosinophils, Absolute 0.10 0.00 - 0.40 10*3/mm3    Basophils, Absolute 0.10 0.00 - 0.20 10*3/mm3    nRBC 0.0 0.0 - 0.2 /100 WBC     CT Abdomen Pelvis Without Contrast    Result Date: 7/20/2023  1. No acute process demonstrated 2. Colonic diverticulosis 3. Moderate bladder distention. Correlate with any difficulty voiding 4. Chronic effusion and atelectasis in the left lower lobe Electronically Signed: Alvarez Molina  7/20/2023 6:19 PM EDT  Workstation ID: [id number]                                        Medical Decision Making  Patient presents with low back pain and constipation differential diagnosis including cauda equina syndrome, fracture, bowel obstruction, ischemic bowel, diverticulitis    Notably she has benign abdominal examination no signs of peritonitis or acute abdomen.  CT scan shows no obstruction with a moderate stool burden.  No compression fractures.  She does not have physical exam findings of cauda equina syndrome.  She was advised of findings she was ordered IV Dilaudid for some acute pain management and was resting more company reexamination.  She is discharged in good condition prescribed mag citrate for her constipation and was referred to the spine center.  Patient is agreeable to plan and given warning signs for return.    Problems Addressed:  Acute low back pain without sciatica, unspecified back pain laterality: complicated acute illness or injury  Constipation, unspecified constipation type: complicated acute illness or injury    Amount and/or Complexity of Data Reviewed  Labs: ordered. Decision-making details documented in ED Course.     Details: CBC normal, comprehensive normal, lipase normal  Radiology: ordered and independent interpretation performed.     Details:  My independent interpretation of CT abdomen image no apparent acute obstruction    Risk  OTC drugs.  Prescription drug management.        Final diagnoses:   Acute low back pain without sciatica, unspecified back pain laterality   Constipation, unspecified constipation type       ED Disposition  ED Disposition       ED Disposition   Discharge    Condition   Stable    Comment   --               Yuliana Issa MD  [address] [facility] [address] [place] [address] [place] [address] [phone]    Schedule an appointment as soon as possible for a visit in 1 week           Medication List        New Prescriptions      magnesium citrate solution  Take 148 mL by mouth Daily As Needed (constipation).               Where to Get Your Medications        These medications were sent to [id number] - [place], [place] - [address] AT SEC OF [facility] [address] - [phone]  - [phone] FX  [address] [place] [address]      Phone: [phone]   magnesium citrate solution            Augustine Lane MD  07/20/23 0593

## 2023-07-20 NOTE — ED NOTES
"Chief Complaint   Patient presents with    Back Pain      Pt states that she hurt her back on Sunday. Pt states she twisted her back and hurt her knee. Pt states that she also heard her left knee \"pop\". Pt denies numbness or tingling. Pt also reports upper and lower abdominal pain on the left side. Pt states that she hasn't had a BM in 5 days. Pt states she takes a stool softener r/t \"bleeding from straining six years ago.\" Pt states she had 3 episodes of vomiting last night, but none today. Pt denies fever, nausea and trouble urinating. Pt states she's cold and \"just doesn't feel right\".    "

## 2023-07-20 NOTE — DISCHARGE INSTRUCTIONS
Follow-up with the [place] [facility], call [phone] to schedule appointment.  Deep tissue massage, warm compress, gentle stretching.  Mag citrate for constipation.  Return for increased pain, vomiting, fever, numbness, weakness or any other concerns

## 2023-07-24 LAB — INR PPP: 1.7

## 2023-07-25 NOTE — PROGRESS NOTES
Subjective   History of Present Illness: Mary Deleon is a 72 y.o. female is being seen for consultation today at the request of Augustine Lane MD for back pain.  Patient suffered a recent twisting incident at the grocery store a little over a week ago and has had left-sided lower back pain ever since.  She describes significant muscle spasms along her lower left paraspinal region.  She describes no radiating leg pain or paresthesias.  She was seen and evaluated at the ED about a week ago and has had some improvement with Tylenol, TENS unit, and topical patches.  She has a remote history of low back problems but no new numbness or weakness.  She has some resolving constipation but no acute bowel/bladder dysfunction or saddle anesthesia.    Back Pain  This is a chronic problem. The current episode started more than 1 year ago. The problem has been gradually worsening since onset. The pain is present in the lumbar spine and sacro-iliac. The quality of the pain is described as aching, burning and stabbing. The pain does not radiate. The pain is The same all the time. The symptoms are aggravated by standing and position. Stiffness is present In the morning. Associated symptoms include tingling. Pertinent negatives include no bladder incontinence or bowel incontinence. Weakness: Back.She has tried heat and ice for the symptoms. The treatment provided mild relief.     The following portions of the patient's history were reviewed and updated as appropriate: allergies, current medications, past family history, past medical history, past social history, past surgical history, and problem list.    Review of Systems   Constitutional:  Positive for activity change.   HENT: Negative.     Eyes: Negative.    Respiratory: Negative.     Cardiovascular: Negative.    Gastrointestinal: Negative.  Negative for bowel incontinence.   Endocrine: Negative.    Genitourinary: Negative.  Negative for bladder incontinence.  "  Musculoskeletal:  Positive for arthralgias, back pain and myalgias.   Skin: Negative.    Allergic/Immunologic: Negative.    Neurological:  Positive for tingling. Weakness: Back.  Hematological: Negative.    Psychiatric/Behavioral:  Positive for sleep disturbance.    All other systems reviewed and are negative.    Objective     ./63 (BP Location: Right leg, Patient Position: Sitting, Cuff Size: Adult)   Pulse 64   Resp 18   Ht 160 cm (63\")   Wt 86.2 kg (190 lb)   LMP  (LMP Unknown)   BMI 33.66 kg/m²    Body mass index is 33.66 kg/m².    Vitals:    07/26/23 1253   PainSc:   7   PainLoc: Back          Physical Exam  Neurologic Exam  Alert and oriented by 3  Speech is intact and coherent articulate with good content and production  Sensation is intact to soft touch and pinprick  in both upper and lower extremities  Gait is nonantalgic  Significant left-sided lower paraspinal muscle tightness.  No midline point tenderness          Assessment & Plan   Independent Review of Radiographic Studies:      I personally reviewed the images from the following studies.    CT abdomen pelvis 7/20/2023    Anterolisthesis of L4 on L5 likely degenerative in nature as patient does have multilevel advanced facet arthropathy.  Decreased bone density suggestive of osteopenia.    Medical Decision Making:      Mary Li a 72 y.o. female with medical history significant for CAD, s/p CABG, DM type II, A-fib on anticoagulation, osteopenia that is presenting today to clinic for recent onset of muscular back pain.  Patient has no acute findings on her recent CT abdomen and pelvis imaging.  She does have multilevel degenerative joint changes likely responsible for her remote chronic issues.  Patient's symptoms/complaints consistent with muscle strain secondary to twisting incident.  This should resolve over time with continued conservative therapy.  And recommend she continue with her previous treatments and will add a " course of physical therapy with concentration on soft tissue mobilization.  No further neurosurgical work-up recommended at this time.  I encourage patient to call the office with questions or concerns.          Diagnoses and all orders for this visit:    1. Muscle strain (Primary)  -     Ambulatory Referral to Physical Therapy Evaluate and treat; Soft Tissue Mobilizaton, Cross Fiber; Stretching    2. Spondylosis of lumbar region without myelopathy or radiculopathy  -     Ambulatory Referral to Physical Therapy Evaluate and treat; Soft Tissue Mobilizaton, Cross Fiber; Stretching      Return if symptoms worsen or fail to improve.    This patient was examined wearing appropriate personal protective equipment.     Mary Deleon  reports that she has never smoked. She has been exposed to tobacco smoke. She has never used smokeless tobacco..Body mass index is 33.66 kg/m².  BMI is >= 30 and <35. (Class 1 Obesity). The following options were offered after discussion;: exercise counseling/recommendations and nutrition counseling/recommendations      Patient's blood pressure was reviewed.  Recommendations for  a low-salt diet and exercise to maintain/improve BP in addition to taking any presribed medications.    Advance Care Planning   ACP discussion was held with the patient during this visit. Patient has an advance directive in EMR which is still valid.          Drea Becerra, APRN  07/26/23  13:26 EDT

## 2023-07-26 ENCOUNTER — OFFICE VISIT (OUTPATIENT)
Dept: NEUROSURGERY | Facility: CLINIC | Age: 73
End: 2023-07-26
Payer: MEDICARE

## 2023-07-26 VITALS
HEART RATE: 64 BPM | BODY MASS INDEX: 33.66 KG/M2 | WEIGHT: 190 LBS | RESPIRATION RATE: 18 BRPM | HEIGHT: 63 IN | DIASTOLIC BLOOD PRESSURE: 63 MMHG | SYSTOLIC BLOOD PRESSURE: 150 MMHG

## 2023-07-26 DIAGNOSIS — M47.816 SPONDYLOSIS OF LUMBAR REGION WITHOUT MYELOPATHY OR RADICULOPATHY: ICD-10-CM

## 2023-07-26 DIAGNOSIS — T14.8XXA MUSCLE STRAIN: Primary | ICD-10-CM

## 2023-07-26 PROBLEM — H25.813 COMBINED FORMS OF AGE-RELATED CATARACT OF BOTH EYES: Status: ACTIVE | Noted: 2022-07-27

## 2023-07-26 PROBLEM — H52.4 PRESBYOPIA: Status: ACTIVE | Noted: 2022-07-27

## 2023-07-26 RX ORDER — CLOBETASOL PROPIONATE 0.5 MG/G
1 CREAM TOPICAL 2 TIMES DAILY
COMMUNITY

## 2023-07-26 RX ORDER — ALBUTEROL SULFATE 90 UG/1
2 AEROSOL, METERED RESPIRATORY (INHALATION) EVERY 4 HOURS PRN
COMMUNITY

## 2023-07-27 ENCOUNTER — ANTICOAGULATION VISIT (OUTPATIENT)
Dept: CARDIOLOGY | Facility: CLINIC | Age: 73
End: 2023-07-27
Payer: MEDICARE

## 2023-07-27 DIAGNOSIS — I48.21 PERMANENT ATRIAL FIBRILLATION: Primary | ICD-10-CM

## 2023-07-27 DIAGNOSIS — Z79.01 LONG TERM (CURRENT) USE OF ANTICOAGULANTS: ICD-10-CM

## 2023-07-27 NOTE — PROGRESS NOTES
Pts INR has been running a little low at 1.7. Will increase pts dosage to 6 mgs on Sunday with 4 mgs all other days and recheck in a month. vivienRN

## 2023-07-28 RX ORDER — METOPROLOL SUCCINATE 25 MG/1
TABLET, EXTENDED RELEASE ORAL
Qty: 90 TABLET | Refills: 0 | Status: SHIPPED | OUTPATIENT
Start: 2023-07-28

## 2023-08-01 ENCOUNTER — OFFICE VISIT (OUTPATIENT)
Dept: CARDIOLOGY | Facility: CLINIC | Age: 73
End: 2023-08-01
Payer: MEDICARE

## 2023-08-01 VITALS
HEART RATE: 59 BPM | HEIGHT: 59 IN | DIASTOLIC BLOOD PRESSURE: 62 MMHG | OXYGEN SATURATION: 94 % | WEIGHT: 186 LBS | SYSTOLIC BLOOD PRESSURE: 108 MMHG | BODY MASS INDEX: 37.5 KG/M2

## 2023-08-01 DIAGNOSIS — E78.5 DYSLIPIDEMIA: ICD-10-CM

## 2023-08-01 DIAGNOSIS — I10 PRIMARY HYPERTENSION: ICD-10-CM

## 2023-08-01 DIAGNOSIS — E11.9 TYPE 2 DIABETES MELLITUS WITHOUT COMPLICATION, WITHOUT LONG-TERM CURRENT USE OF INSULIN: ICD-10-CM

## 2023-08-01 DIAGNOSIS — Z79.01 LONG TERM (CURRENT) USE OF ANTICOAGULANTS: ICD-10-CM

## 2023-08-01 DIAGNOSIS — E78.2 MIXED HYPERLIPIDEMIA: ICD-10-CM

## 2023-08-01 DIAGNOSIS — I25.110 CORONARY ARTERY DISEASE INVOLVING NATIVE CORONARY ARTERY OF NATIVE HEART WITH UNSTABLE ANGINA PECTORIS: ICD-10-CM

## 2023-08-01 DIAGNOSIS — I48.21 PERMANENT ATRIAL FIBRILLATION: Primary | ICD-10-CM

## 2023-08-01 DIAGNOSIS — Z95.1 PRESENCE OF AORTOCORONARY BYPASS GRAFT: ICD-10-CM

## 2023-08-01 RX ORDER — LISINOPRIL 10 MG/1
10 TABLET ORAL DAILY
Qty: 90 TABLET | Refills: 3 | Status: SHIPPED | OUTPATIENT
Start: 2023-08-01

## 2023-08-07 ENCOUNTER — TREATMENT (OUTPATIENT)
Dept: PHYSICAL THERAPY | Facility: CLINIC | Age: 73
End: 2023-08-07
Payer: MEDICARE

## 2023-08-07 DIAGNOSIS — M54.42 ACUTE LEFT-SIDED LOW BACK PAIN WITH LEFT-SIDED SCIATICA: ICD-10-CM

## 2023-08-07 DIAGNOSIS — T14.8XXA MUSCLE STRAIN: Primary | ICD-10-CM

## 2023-08-07 PROCEDURE — G0283 ELEC STIM OTHER THAN WOUND: HCPCS | Performed by: PHYSICAL THERAPIST

## 2023-08-07 PROCEDURE — 97161 PT EVAL LOW COMPLEX 20 MIN: CPT | Performed by: PHYSICAL THERAPIST

## 2023-08-07 PROCEDURE — 97110 THERAPEUTIC EXERCISES: CPT | Performed by: PHYSICAL THERAPIST

## 2023-08-07 PROCEDURE — 97140 MANUAL THERAPY 1/> REGIONS: CPT | Performed by: PHYSICAL THERAPIST

## 2023-08-07 NOTE — PROGRESS NOTES
Physical Therapy Initial Evaluation and Plan of Care  [address] [facility], [address], [place]  [address]    Patient: Mary Deleon   : 1950  Diagnosis/ICD-10 Code:  Muscle strain [T14.8XXA]  Referring practitioner: SILVANO Reynolds  Date of Initial Visit: 2023  Today's Date: 2023  Patient seen for 1 sessions           Subjective Questionnaire: Oswestry: 24% impairment      Subjective Evaluation    History of Present Illness  Mechanism of injury: Patient is a 72 y.o. WF who presents with lumbar strain after turning to left in grocery with half-gallon of milk.  Pain has started to improve and she is using her TENS unit at home but she lives alone and has difficulty getting electrodes in the right place.  PMHx: CAD, MVP, Afib with CABG x 3. Pain rating 4-5/10 L sided low back and increases with rotation L, car transfers, lifting, standing, walking.  Goals:  decrease pain.    Patient Goals  Patient goals for therapy: decreased pain and return to sport/leisure activities           Objective Oswestry function score indicates 24% impairment with limitations in lifting, standing, pain, walking.  Tenderness and muscle guarding L T8-L5 PSM.  AROM lumbar is WNL with stretch/stiffness at end range flexion, pain on rotation L, otherwise unremarkable.  Repeated sit to stand = 4 reps in 30 sec using both arms to assist.  MMT:  4-4+/5 B LE.  Negative SLR.  Decreased flexibility L piriformis.      Assessment & Plan     Assessment  Impairments: abnormal gait, abnormal muscle firing, abnormal or restricted ROM, activity intolerance, impaired physical strength, lacks appropriate home exercise program and pain with function  Functional Limitations: carrying objects, lifting, walking, pulling, uncomfortable because of pain, reaching behind back and reaching overhead  Goals  Plan Goals: Patient independent with HEP in 2 weeks  Tolerate 10 min Nustep in 2 weeks  Able to get in/out of car without increased pain in  2 weeks  Able to ambulate independently without AD and minimal antalgia in 2 weeks  Improve function as evidenced by Oswestry score of 20% or less in 12 weeks (24% impairment initially)  Able to decrease pain 50% in 12 weeks  Perform 10 reps of repeated sit to stand without arms in 30 seconds in 12 weeks      Plan  Therapy options: will be seen for skilled therapy services  Planned modality interventions: TENS, electrical stimulation/Russian stimulation, thermotherapy (hydrocollator packs) and ultrasound  Other planned modality interventions: physical modalities as needed  Planned therapy interventions: manual therapy, abdominal trunk stabilization, balance/weight-bearing training, flexibility, functional ROM exercises, home exercise program, therapeutic activities, stretching, strengthening and neuromuscular re-education  Frequency: 2x week  Duration in weeks: 12  Treatment plan discussed with: patient      Timed:         Manual Therapy:    10     mins  06448;     Therapeutic Exercise:    15     mins  44929;     Neuromuscular Selvin:        mins  66240;    Therapeutic Activity:          mins  43169;     Gait Training:           mins  69690;     Ultrasound:          mins  06325;    Self-care  ____ mins 54497    Un-Timed:  Electrical Stimulation:    15     mins  73017 ( );  Dry Needling          mins self-pay 37802  Traction          mins 89671  Low Eval     20     Mins  69889  Mod Eval          Mins  13409  Canal repositioning _____ mins  57194        Timed Treatment:   25   mins   Total Treatment:     60   mins    PT SIGNATURE: Robin A Sprigler, PT   [place] license # [id number]  Electronically signed by Robin A Sprigler, PT, 08/07/23, 2:29 PM EDT    Initial Certification  Certification Period: 8/7/2023 through 11/4/2023  I certify that the therapy services are furnished while this patient is under my care.  The services outlined above are required by this patient, and will be reviewed every 90 days.     PHYSICIAN:  Mariam, Drea, APRN ______________________________________________________________________________________      DATE: ___________________________________________________________________________________________    Please sign and return via fax to [phone]. Thank you, [facility].

## 2023-08-09 ENCOUNTER — TREATMENT (OUTPATIENT)
Dept: PHYSICAL THERAPY | Facility: CLINIC | Age: 73
End: 2023-08-09
Payer: MEDICARE

## 2023-08-09 DIAGNOSIS — M54.2 CERVICALGIA: ICD-10-CM

## 2023-08-09 DIAGNOSIS — T14.8XXA MUSCLE STRAIN: Primary | ICD-10-CM

## 2023-08-09 DIAGNOSIS — M43.6 NECK STIFFNESS: ICD-10-CM

## 2023-08-09 DIAGNOSIS — M54.42 ACUTE LEFT-SIDED LOW BACK PAIN WITH LEFT-SIDED SCIATICA: ICD-10-CM

## 2023-08-09 NOTE — PROGRESS NOTES
Physical Therapy Daily Treatment Note      Patient: Mary Deleon   : 1950  Diagnosis/ICD-10 Code:  Muscle strain [T14.8XXA]  Referring practitioner: SILVANO Reynolds  Date of Initial Visit: Type: THERAPY  Noted: 2023  Today's Date: 2023  Patient seen for 2 sessions         Mary Deleon reports: the L low thoracic upper lumbar region is bothering her the most and is the most tender when touched today. Pt. States she feels TENS would feel good there but she is unable to place pads there at home due to reach. Pt. States she odes have some L LE discomfort today at the distal glute and proximal HS and this area fluctuates in intensity daily.    Objective   See Exercise, Manual, and Modality Logs for complete treatment.     Assessment/Plan  Pt. Responds well to introduction of exercise and manual treatment this date and has very good response to l Lateral stretching over PB for lat and associated lateral musculature stretch. P.t had estim placed in desired region this date and heat accompanied with good response.    Goals  Plan Goals: Patient independent with HEP in 2 weeks  Tolerate 10 min Nustep in 2 weeks  Able to get in/out of car without increased pain in 2 weeks  Able to ambulate independently without AD and minimal antalgia in 2 weeks  Improve function as evidenced by Oswestry score of 20% or less in 12 weeks (24% impairment initially)  Able to decrease pain 50% in 12 weeks  Perform 10 reps of repeated sit to stand without arms in 30 seconds in 12 weeks    Progress strengthening /stabilization /functional activity           Timed:         Manual Therapy:    15     mins  15127;     Therapeutic Exercise:    15     mins  51746;     Neuromuscular Selvin:   10     mins  38725;      Un-Timed:  Electrical Stimulation:    15     mins  56307 ( );      Timed Treatment:   40   mins   Total Treatment:     55   mins    Lary Lujan PTA  Physical Therapist Assistant License  #[id number]

## 2023-08-14 ENCOUNTER — TREATMENT (OUTPATIENT)
Dept: PHYSICAL THERAPY | Facility: CLINIC | Age: 73
End: 2023-08-14
Payer: MEDICARE

## 2023-08-14 DIAGNOSIS — M54.42 ACUTE LEFT-SIDED LOW BACK PAIN WITH LEFT-SIDED SCIATICA: ICD-10-CM

## 2023-08-14 DIAGNOSIS — T14.8XXA MUSCLE STRAIN: Primary | ICD-10-CM

## 2023-08-14 NOTE — PROGRESS NOTES
Physical Therapy Daily Treatment Note    Patient: Mary Deleon   : 1950  Diagnosis/ICD-10 Code:  Muscle strain [T14.8XXA]  Referring practitioner: SILVANO Reynolds  Date of Initial Visit: Type: THERAPY  Noted: 2023  Today's Date: 2023  Patient seen for 3 sessions             Subjective Patient felt better after last treatment until she mopped under her baker's rack.      Objective   See Exercise, Manual, and Modality Logs for complete treatment. Progressed exercise in clinic (Nustep, leg press) with good initial response.  Held manual therapy today.  Assess response to treatment next visit.      Assessment/Plan  Patient independent with HEP in 2 weeks - MET  Tolerate 10 min Nustep in 2 weeks - NOT MET  Able to get in/out of car without increased pain in 2 weeks - NOT MET  Able to ambulate independently without AD and minimal antalgia in 2 weeks - NOT MET  Improve function as evidenced by Oswestry score of 20% or less in 12 weeks (24% impairment initially) - NOT MET  Able to decrease pain 50% in 12 weeks - NOT MET  Perform 10 reps of repeated sit to stand without arms in 30 seconds in 12 weeks - NOT MET    Progress per Plan of Care exp 10/30/23           Timed:         Manual Therapy:         mins  13626;     Therapeutic Exercise:    15     mins  70689;     Neuromuscular Selvin:    15    mins  65048;    Therapeutic Activity:          mins  27213;     Gait Training:           mins  62212;     Ultrasound:          mins  29382;    Ionto                                   mins   58552  Self Care                            mins   65069      Un-Timed:  Electrical Stimulation:    15     mins  82488 ( );  Dry Needling          mins self-pay  Traction          mins 33922  Low Eval          Mins  44881  Mod Eval          Mins  74479  High Eval                            Mins  09018  Canalith Repos                   mins  99080    Timed Treatment:   30   mins   Total Treatment:     45    mins    Robin A Sprigler, PT  Physical Therapist

## 2023-08-16 ENCOUNTER — TREATMENT (OUTPATIENT)
Dept: PHYSICAL THERAPY | Facility: CLINIC | Age: 73
End: 2023-08-16
Payer: MEDICARE

## 2023-08-16 DIAGNOSIS — M43.6 NECK STIFFNESS: ICD-10-CM

## 2023-08-16 DIAGNOSIS — M54.42 ACUTE LEFT-SIDED LOW BACK PAIN WITH LEFT-SIDED SCIATICA: ICD-10-CM

## 2023-08-16 DIAGNOSIS — M54.2 CERVICALGIA: ICD-10-CM

## 2023-08-16 DIAGNOSIS — T14.8XXA MUSCLE STRAIN: Primary | ICD-10-CM

## 2023-08-16 NOTE — PROGRESS NOTES
Physical Therapy Daily Treatment Note      Patient: Mary Deleon   : 1950  Diagnosis/ICD-10 Code:  Muscle strain [T14.8XXA]  Referring practitioner: SILVANO Reynolds  Date of Initial Visit: Type: THERAPY  Noted: 2023  Today's Date: 2023  Patient seen for 4 sessions         Mary Deleon reports: she is in significant pain today in the low back radiating into the R LE badly. Pt states previously it has been the l LE and she is concerned because this pain has been relentless today.  Pt. States she has used her TENS unit at home for the best relief.    Objective   See Exercise, Manual, and Modality Logs for complete treatment.     Assessment/Plan  Pt. Denies the ability to tolerate exercise today stating movement is making her leg shoot pain and it hurts too bad to move much therefore therapy was performed seated and manual intervention was limited to STM's this date due to Pt. Overwhelming discomfort. Pt. Was encouraged to not just allow her body to be immobile because the pain and stiffness will jut get worse. Estim and heat was applied calming down the pain some prior to exiting therapy.    Patient independent with HEP in 2 weeks - MET  Tolerate 10 min Nustep in 2 weeks - NOT MET  Able to get in/out of car without increased pain in 2 weeks - NOT MET  Able to ambulate independently without AD and minimal antalgia in 2 weeks - NOT MET  Improve function as evidenced by Oswestry score of 20% or less in 12 weeks (24% impairment initially) - NOT MET  Able to decrease pain 50% in 12 weeks - NOT MET  Perform 10 reps of repeated sit to stand without arms in 30 seconds in 12 weeks - NOT MET    Progress strengthening /stabilization /functional activity           Timed:         Manual Therapy:    25     mins  46487;        Un-Timed:  Electrical Stimulation:    15     mins  40857 ( );      Timed Treatment:   25   mins   Total Treatment:     40   mins    Lary Lujan PTA  Physical  Therapist Assistant License #[id number]

## 2023-08-18 ENCOUNTER — TELEPHONE (OUTPATIENT)
Dept: CARDIOLOGY | Facility: CLINIC | Age: 73
End: 2023-08-18
Payer: MEDICARE

## 2023-08-18 RX ORDER — METOPROLOL SUCCINATE 25 MG/1
25 TABLET, EXTENDED RELEASE ORAL DAILY
Qty: 30 TABLET | Refills: 0 | Status: SHIPPED | OUTPATIENT
Start: 2023-08-18

## 2023-08-18 NOTE — TELEPHONE ENCOUNTER
Caller: Adrienne Mary ЮЛИЯ    Relationship: Self    Best call back number: [phone]     Requested Prescriptions:   Requested Prescriptions     Pending Prescriptions Disp Refills    metoprolol succinate XL (TOPROL-XL) 25 MG 24 hr tablet 90 tablet 0     Sig: Take 1 tablet by mouth Daily.        Pharmacy where request should be sent: [facility] [address] [place] [address] [address] - [phone]  - [phone]     Last office visit with prescribing clinician: 3/27/2023   Last telemedicine visit with prescribing clinician: Visit date not found   Next office visit with prescribing clinician: 10/2/2023     Additional details provided by patient: PT STATES SHE HAS TWO DAYS LEFT AND SHE WILL BE OUT OF HER MEDICATION - T STATES SHE RECEIVED A CALL AND WASN'T SURE IF IT WAS REGARDING MEDICATION REFILL - SHE IS REQUESTING 30 DAY SUPPLY     Does the patient have less than a 3 day supply:  [x] Yes  [] No    Would you like a call back once the refill request has been completed: [] Yes [] No    If the office needs to give you a call back, can they leave a voicemail: [] Yes [] No    Chacha Tamayo Rep   08/18/23 09:58 EDT

## 2023-08-21 ENCOUNTER — TREATMENT (OUTPATIENT)
Dept: PHYSICAL THERAPY | Facility: CLINIC | Age: 73
End: 2023-08-21
Payer: MEDICARE

## 2023-08-21 DIAGNOSIS — M54.42 ACUTE LEFT-SIDED LOW BACK PAIN WITH LEFT-SIDED SCIATICA: ICD-10-CM

## 2023-08-21 DIAGNOSIS — T14.8XXA MUSCLE STRAIN: Primary | ICD-10-CM

## 2023-08-21 NOTE — PROGRESS NOTES
Physical Therapy Daily Treatment Note      Patient: Mary Deleon   : 1950  Diagnosis/ICD-10 Code:  Muscle strain [T14.8XXA]  Referring practitioner: SILVANO Reynolds  Date of Initial Visit: Type: THERAPY  Noted: 2023  Today's Date: 2023  Patient seen for 5 sessions         Mary Deleon reports: the weekend went better than her last visit stating she stayed on the TENS unit a lot but she did do some of the suggested stretching and hip movements recommended and they went well. Pt. States the pain is still there but no longer overly aggressive but she has to be slow and gentle with what she does because if she moves too quick it feels like it may spasm.    Objective   See Exercise, Manual, and Modality Logs for complete treatment.     Assessment/Plan  Pt. Tolerates treatment well this visit and continues to benefit form gentle controlled movements for stretch and manual STM for relief of tension. Pt. Will continue to benefit from return to exercise as tolerated to improve mobility and function.    Patient independent with HEP in 2 weeks - MET  Tolerate 10 min Nustep in 2 weeks - NOT MET  Able to get in/out of car without increased pain in 2 weeks - NOT MET  Able to ambulate independently without AD and minimal antalgia in 2 weeks - NOT MET  Improve function as evidenced by Oswestry score of 20% or less in 12 weeks (24% impairment initially) - NOT MET  Able to decrease pain 50% in 12 weeks - NOT MET  Perform 10 reps of repeated sit to stand without arms in 30 seconds in 12 weeks - NOT MET    Progress strengthening /stabilization /functional activity           Timed:         Manual Therapy:    15     mins  94901;     Therapeutic Exercise:    15     mins  14838;       Un-Timed:  Electrical Stimulation:    15     mins  65042 ( );      Timed Treatment:   30   mins   Total Treatment:     45   mins    Lary Lujan PTA  Physical Therapist Assistant License #[id number]

## 2023-08-23 ENCOUNTER — TREATMENT (OUTPATIENT)
Dept: PHYSICAL THERAPY | Facility: CLINIC | Age: 73
End: 2023-08-23
Payer: MEDICARE

## 2023-08-23 DIAGNOSIS — M54.42 ACUTE LEFT-SIDED LOW BACK PAIN WITH LEFT-SIDED SCIATICA: ICD-10-CM

## 2023-08-23 DIAGNOSIS — M54.2 CERVICALGIA: ICD-10-CM

## 2023-08-23 DIAGNOSIS — M43.6 NECK STIFFNESS: ICD-10-CM

## 2023-08-23 DIAGNOSIS — T14.8XXA MUSCLE STRAIN: Primary | ICD-10-CM

## 2023-08-23 NOTE — PROGRESS NOTES
Physical Therapy Daily Treatment Note      Patient: Mary Deleon   : 1950  Diagnosis/ICD-10 Code:  Muscle strain [T14.8XXA]  Referring practitioner: SILVANO Reynolds  Date of Initial Visit: Type: THERAPY  Noted: 2023  Today's Date: 2023  Patient seen for 6 sessions         Mary Deleon reports: she is gradually doing better now stating the overwhelming pain has since passed and now a dull consistent ache remains. Pt. States she did some mopping and vacuuming at home and when she could feel the soreness setting in she would take a break.    Objective   See Exercise, Manual, and Modality Logs for complete treatment.     Assessment/Plan  Pt. Tolerates treatment well this visit and is showing better response to stretches and exercises this date returning to leg press and standing stretches over PB.    Patient independent with HEP in 2 weeks - MET  Tolerate 10 min Nustep in 2 weeks - NOT MET  Able to get in/out of car without increased pain in 2 weeks - NOT MET  Able to ambulate independently without AD and minimal antalgia in 2 weeks - NOT MET  Improve function as evidenced by Oswestry score of 20% or less in 12 weeks (24% impairment initially) - NOT MET  Able to decrease pain 50% in 12 weeks - NOT MET  Perform 10 reps of repeated sit to stand without arms in 30 seconds in 12 weeks - NOT MET    Progress strengthening /stabilization /functional activity           Timed:         Manual Therapy:    10     mins  46613;     Therapeutic Exercise:    20     mins  58584;     Un-Timed:  Electrical Stimulation:    15     mins  83574 ( );      Timed Treatment:   30   mins   Total Treatment:     45   mins    Lary Lujan PTA  Physical Therapist Assistant License #[id number]

## 2023-08-24 ENCOUNTER — TELEPHONE (OUTPATIENT)
Dept: CARDIOLOGY | Facility: CLINIC | Age: 73
End: 2023-08-24

## 2023-08-24 NOTE — TELEPHONE ENCOUNTER
Spoke with the patient, asked if she had taken her BP & she said she does not have a bp cuff at home. She has been taking Lisinopril 10 mg daily since the beginning of August. She also has an UTI, that she states is a side effect of Lisinopril. She would like to switch to another bp med or at least lower the dose of Lisinopril. She has an appointment with her PCP tomorrow at 930 for the UTI. I advised her to hold her dose in the morning & asked her to call me after the appointment to let me know what her bp was.

## 2023-08-24 NOTE — TELEPHONE ENCOUNTER
Caller: Mary Deleon    Relationship to patient: Self    Best call back number: [phone]    Patient is needing: PT REPORTS SHE HAS BEEN DIZZY SINCE SHE SWITCHED TO LISINOPRIL. WANTS TO DISCUSS A LOWER DOSE OR A DIFFERENT OPTION.

## 2023-08-25 ENCOUNTER — TELEPHONE (OUTPATIENT)
Dept: CARDIOLOGY | Facility: CLINIC | Age: 73
End: 2023-08-25
Payer: MEDICARE

## 2023-08-25 DIAGNOSIS — E11.9 TYPE 2 DIABETES MELLITUS WITHOUT COMPLICATION, WITHOUT LONG-TERM CURRENT USE OF INSULIN: ICD-10-CM

## 2023-08-25 DIAGNOSIS — I10 PRIMARY HYPERTENSION: ICD-10-CM

## 2023-08-25 DIAGNOSIS — I25.110 CORONARY ARTERY DISEASE INVOLVING NATIVE CORONARY ARTERY OF NATIVE HEART WITH UNSTABLE ANGINA PECTORIS: ICD-10-CM

## 2023-08-25 RX ORDER — LISINOPRIL 10 MG/1
5 TABLET ORAL DAILY
Start: 2023-08-25

## 2023-08-25 NOTE — TELEPHONE ENCOUNTER
Returned call to patient and she wanted us to know her B/P at the urology office earlier today was 126/60. Patient said she held her Lisinopril 10mg daily (takes in the evening) last night.  Patient stated she was very dizzy yesterday-felt like she may pass out and since she held the Lisinopril, patient said she feels much better-just slightly dizzy; patient had been on Amlodipine (stopped due to kidney protection) and Dr. Madden switched to Lisinopril on 8/1/23.  Patient said she was told yesterday to call in with a B/P. Patient said she does not have a B/P cuff at home, but will try to go to [facility] and check her B/P.  Patient wants to know if she can start  take a lower dose of Lisinopril on her next dose tonight, since her dizziness seems to have improved when holding Lisinopril.    Please advise,    Thanks Ailyn          ................................................................................................................................................      11:40 AM  Mary Deleon Donna, RegSched Rep Nichter, Donna, RegSched Rep     DN    11:41 AM  Note      PT WAS ADVISED TO CALL AFTER UROLOGY APPT W/ BP     126/60     PLEASE RETURN CALL

## 2023-08-25 NOTE — TELEPHONE ENCOUNTER
She can cut her lisinopril in half to 5 mg once daily. She should monitor her blood pressure and report back to us after 1 week. Please advise patient.

## 2023-08-25 NOTE — TELEPHONE ENCOUNTER
Placed a call to patient and informed her to cuff her Lisinpril in half to Lisinopril 5mg once daily and to monitor B/P for a week and report back to us per orders from SLIVANO Mittal.  Patient verbalizes understanding

## 2023-08-25 NOTE — TELEPHONE ENCOUNTER
Noted. I agree, patient needs to monitor her blood pressure daily and keep a log to report back to us.

## 2023-08-30 ENCOUNTER — TREATMENT (OUTPATIENT)
Dept: PHYSICAL THERAPY | Facility: CLINIC | Age: 73
End: 2023-08-30
Payer: MEDICARE

## 2023-08-30 DIAGNOSIS — M54.42 ACUTE LEFT-SIDED LOW BACK PAIN WITH LEFT-SIDED SCIATICA: ICD-10-CM

## 2023-08-30 DIAGNOSIS — M54.2 CERVICALGIA: ICD-10-CM

## 2023-08-30 DIAGNOSIS — T14.8XXA MUSCLE STRAIN: Primary | ICD-10-CM

## 2023-08-30 DIAGNOSIS — M43.6 NECK STIFFNESS: ICD-10-CM

## 2023-08-30 NOTE — PROGRESS NOTES
Physical Therapy Daily Treatment Note      Patient: Mary Deleon   : 1950  Diagnosis/ICD-10 Code:  Muscle strain [T14.8XXA]  Referring practitioner: SILVANO Reynolds  Date of Initial Visit: Type: THERAPY  Noted: 2023  Today's Date: 2023  Patient seen for 7 sessions         Mary Deleon reports: she is feeling terrible today stating she has dealt with a recent change in her blood pressure medication and now she has been diagnosed with a UTI and she states it was all she could do to get through her shower and get out the door to get here but now that she's here she feels she should have called in because she is just not up to it today. Pt.states she forced herself to come in because she had already cancelled once earlier this week and feared she would be kicked out of therapy if she cancelled again due to this policy tht is posted in the office regarding no shows and cancellations.    Objective   See Exercise, Manual, and Modality Logs for complete treatment.     Blood Pressure 112/60 mmHg     Assessment/Plan  Pt. BP was measured by Sabina Lemus manually and found to be on the low side. Pt. Appeared very fatigued and weak today as well as somewhat pale. Pt. was encouraged that she would not lose her spot as long as she continued to have open communication with us and it was not just negligently missing appointments or frequent cancellation. Pt. Was offered a light treatment this date but opted to go home instead due to feeling too exhausted.    Patient independent with HEP in 2 weeks - MET  Tolerate 10 min Nustep in 2 weeks - NOT MET  Able to get in/out of car without increased pain in 2 weeks - NOT MET  Able to ambulate independently without AD and minimal antalgia in 2 weeks - NOT MET  Improve function as evidenced by Oswestry score of 20% or less in 12 weeks (24% impairment initially) - NOT MET  Able to decrease pain 50% in 12 weeks - NOT MET  Perform 10 reps of repeated sit  to stand without arms in 30 seconds in 12 weeks - NOT MET    Progress strengthening /stabilization /functional activity    No Time no charge this date    Lary Lujan PTA  Physical Therapist Assistant License #[id number]

## 2023-09-05 ENCOUNTER — TREATMENT (OUTPATIENT)
Dept: PHYSICAL THERAPY | Facility: CLINIC | Age: 73
End: 2023-09-05
Payer: MEDICARE

## 2023-09-05 DIAGNOSIS — M43.6 NECK STIFFNESS: ICD-10-CM

## 2023-09-05 DIAGNOSIS — M54.42 ACUTE LEFT-SIDED LOW BACK PAIN WITH LEFT-SIDED SCIATICA: ICD-10-CM

## 2023-09-05 DIAGNOSIS — T14.8XXA MUSCLE STRAIN: Primary | ICD-10-CM

## 2023-09-05 DIAGNOSIS — M54.2 CERVICALGIA: ICD-10-CM

## 2023-09-05 NOTE — PROGRESS NOTES
Physical Therapy Daily Treatment Note      Patient: Mary Deleon   : 1950  Diagnosis/ICD-10 Code:  Muscle strain [T14.8XXA]  Referring practitioner: SILVANO Reynolds  Date of Initial Visit: Type: THERAPY  Noted: 2023  Today's Date: 2023  Patient seen for 8 sessions         Mary Deleon reports: she is doing much better today now that she has gotten over being sick and gotten her medication straightened out. Pt. States she attempted some cleaning yesterday and when the pain started coming on she stopped and so far she has not had any new flare she just needs to increase her activity tolerance now.    Objective   See Exercise, Manual, and Modality Logs for complete treatment.     Assessment/Plan  Pt. Tolerates treatment very well this date and Pt. will benefit from increase of exercise to include core and back strengthening anticipate leg raise activities and PB activities for core and dead lifts banded or weighted for lumbar strengthening.    Patient independent with HEP in 2 weeks - MET  Tolerate 10 min Nustep in 2 weeks - NOT MET  Able to get in/out of car without increased pain in 2 weeks - NOT MET  Able to ambulate independently without AD and minimal antalgia in 2 weeks - NOT MET  Improve function as evidenced by Oswestry score of 20% or less in 12 weeks (24% impairment initially) - NOT MET  Able to decrease pain 50% in 12 weeks - NOT MET  Perform 10 reps of repeated sit to stand without arms in 30 seconds in 12 weeks - NOT MET    Progress strengthening /stabilization /functional activity           Timed:         Manual Therapy:    15     mins  54232;     Therapeutic Exercise:    15     mins  14820;     Un-Timed:  Electrical Stimulation:    15     mins  11641 ( );      Timed Treatment:   30   mins   Total Treatment:     45   mins    Lary Lujan PTA  Physical Therapist Assistant License #[id number]

## 2023-09-06 RX ORDER — METOPROLOL SUCCINATE 25 MG/1
25 TABLET, EXTENDED RELEASE ORAL DAILY
Qty: 90 TABLET | Refills: 1 | Status: SHIPPED | OUTPATIENT
Start: 2023-09-06

## 2023-09-18 ENCOUNTER — TREATMENT (OUTPATIENT)
Dept: PHYSICAL THERAPY | Facility: CLINIC | Age: 73
End: 2023-09-18
Payer: MEDICARE

## 2023-09-18 DIAGNOSIS — T14.8XXA MUSCLE STRAIN: Primary | ICD-10-CM

## 2023-09-18 DIAGNOSIS — M54.2 CERVICALGIA: ICD-10-CM

## 2023-09-18 DIAGNOSIS — M54.42 ACUTE LEFT-SIDED LOW BACK PAIN WITH LEFT-SIDED SCIATICA: ICD-10-CM

## 2023-09-18 RX ORDER — ISOSORBIDE DINITRATE 10 MG/1
TABLET ORAL
Qty: 270 TABLET | Refills: 3 | Status: SHIPPED | OUTPATIENT
Start: 2023-09-18

## 2023-09-18 NOTE — PROGRESS NOTES
Physical Therapy Progress Note / Reassessment  [address], [address], [place]  [address]    Patient: Mary Deleon   : 1950  Diagnosis/ICD-10 Code:  Muscle strain [T14.8XXA]  Referring practitioner: SILVANO Reynolds  Date of Initial Evaluation:  Type: THERAPY  Noted: 2023  Patient seen for 9 sessions      Subjective:   Visit Diagnoses:    ICD-10-CM ICD-9-CM   1. Muscle strain  T14.8XXA 848.9   2. Acute left-sided low back pain with left-sided sciatica  M54.42 724.2     724.3   3. Cervicalgia  M54.2 723.1         Mary Deleon reports she is feeling much better  Subjective Questionnaire: Oswestry: 12%, improved from 24% initially  Clinical Progress: improved  Home Program Compliance: Yes  Treatment has included: therapeutic exercise, manual therapy, therapeutic activity, electrical stimulation, and moist heat      Subjective Patient reports she is feeling much better!  She is able to get in/out of car without pain.  Pain has decreased more than 50% since starting PT.  She really feels the heat, electrical stimulation, and lat stretch.      Objective Oswestry function score indicates 12% impairment, improved from 24% initially with limitations in lifting, walking.  Only minimal tenderness and muscle guarding L T8-L5 PSM.  AROM lumbar is WNL with stretch/stiffness at end range flexion, pain on rotation L, otherwise unremarkable.  Repeated sit to stand = 4 reps in 30 sec using both arms to assist.  MMT:  4-4+/5 B LE.  Negative SLR.  Decreased flexibility L piriformis.       Assessment/Plan  Patient independent with HEP in 2 weeks - MET  Tolerate 10 min Nustep in 2 weeks - MET  Able to get in/out of car without increased pain in 2 weeks - MET  Able to ambulate independently without AD and minimal antalgia in 2 weeks - MET  Improve function as evidenced by Oswestry score of 20% or less in 12 weeks (24% impairment initially) - MET  Able to decrease pain 50% in 12 weeks - MET  Perform 10  reps of repeated sit to stand without arms in 30 seconds in 12 weeks - NOT MET, currently 6 reps in 30 sec     Recommendations: Continue as planned  Timeframe: 6 weeks  Prognosis to achieve goals: good      Timed:         Manual Therapy:         mins  87524;     Therapeutic Exercise:    15     mins  93775;     Neuromuscular Selvin:        mins  36279;    Therapeutic Activity:     10     mins  76113;     Gait Training:           mins  55976;     Ultrasound:          mins  30592;    Ionto                                   mins   96018  Self Care                            mins   37135    Un-Timed:  Electrical Stimulation:    15     mins  34304 ( );  Dry Needling          mins self-pay  Traction          mins 89494  Canalith Repos         mins 69971    Timed Treatment:   25   mins   Total Treatment:     40   mins    PT Signature: Robin A Sprigler, PT  [place] License: [id number]

## 2023-09-20 ENCOUNTER — TREATMENT (OUTPATIENT)
Dept: PHYSICAL THERAPY | Facility: CLINIC | Age: 73
End: 2023-09-20
Payer: MEDICARE

## 2023-09-20 DIAGNOSIS — M54.2 CERVICALGIA: ICD-10-CM

## 2023-09-20 DIAGNOSIS — T14.8XXA MUSCLE STRAIN: Primary | ICD-10-CM

## 2023-09-20 DIAGNOSIS — M43.6 NECK STIFFNESS: ICD-10-CM

## 2023-09-20 DIAGNOSIS — M54.42 ACUTE LEFT-SIDED LOW BACK PAIN WITH LEFT-SIDED SCIATICA: ICD-10-CM
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
## 2023-09-25 ENCOUNTER — TREATMENT (OUTPATIENT)
Dept: PHYSICAL THERAPY | Facility: CLINIC | Age: 73
End: 2023-09-25

## 2023-09-25 DIAGNOSIS — T14.8XXA MUSCLE STRAIN: Primary | ICD-10-CM

## 2023-09-25 DIAGNOSIS — M54.42 ACUTE LEFT-SIDED LOW BACK PAIN WITH LEFT-SIDED SCIATICA: ICD-10-CM

## 2023-09-25 NOTE — PROGRESS NOTES
Physical Therapy Daily Treatment Note    Patient: Mary Deleon   : 1950  Diagnosis/ICD-10 Code:  Muscle strain [T14.8XXA]  Referring practitioner: SILVANO Reynolds  Date of Initial Visit: Type: THERAPY  Noted: 2023  Today's Date: 2023  Patient seen for 11 sessions             Subjective Patient continues to feel better every day, only stiffness today.      Objective   See Exercise, Manual, and Modality Logs for complete treatment. Reviewed and updated HEP today with open book stretching in sidelying.  Oswestry function score indicates 12% impairment, improved from 24% initially with limitations in lifting, walking.  Only minimal tenderness and muscle guarding L T8-L5 PSM.  AROM lumbar is WNL with stretch/stiffness at end range flexion, pain on rotation L, otherwise unremarkable.  Repeated sit to stand = 6 reps in 30 sec using both arms to assist.  MMT:  4-4+/5 B LE.  Negative SLR.  Decreased flexibility L piriformis.        Assessment/Plan  Patient independent with HEP in 2 weeks - MET  Tolerate 10 min Nustep in 2 weeks - MET  Able to get in/out of car without increased pain in 2 weeks - MET  Able to ambulate independently without AD and minimal antalgia in 2 weeks - MET  Improve function as evidenced by Oswestry score of 20% or less in 12 weeks (24% impairment initially) - MET  Able to decrease pain 50% in 12 weeks - MET  Perform 10 reps of repeated sit to stand without arms in 30 seconds in 12 weeks - NOT MET, currently 6 reps in 30 sec    Progress per Plan of Care exp 10/30/23           Timed:         Manual Therapy:         mins  04961;     Therapeutic Exercise:    15     mins  42593;     Neuromuscular Selvin:        mins  75635;    Therapeutic Activity:     15     mins  42049;     Gait Training:           mins  12321;     Ultrasound:          mins  54547;    Ionto                                   mins   40760  Self Care                            mins    80157      Un-Timed:  Electrical Stimulation:    15     mins  03191 ( );  Dry Needling          mins self-pay  Traction          mins 12934  Low Eval          Mins  19520  Mod Eval          Mins  65597  High Eval                            Mins  71624  Canalith Repos                   mins  98417    Timed Treatment:   30   mins   Total Treatment:     45   mins    Robin A Sprigler, PT  Physical Therapist

## 2023-09-27 ENCOUNTER — TREATMENT (OUTPATIENT)
Dept: PHYSICAL THERAPY | Facility: CLINIC | Age: 73
End: 2023-09-27
Payer: MEDICARE

## 2023-09-27 DIAGNOSIS — M43.6 NECK STIFFNESS: ICD-10-CM

## 2023-09-27 DIAGNOSIS — M54.2 CERVICALGIA: ICD-10-CM

## 2023-09-27 DIAGNOSIS — T14.8XXA MUSCLE STRAIN: Primary | ICD-10-CM

## 2023-09-27 DIAGNOSIS — M54.42 ACUTE LEFT-SIDED LOW BACK PAIN WITH LEFT-SIDED SCIATICA: ICD-10-CM

## 2023-09-27 NOTE — PROGRESS NOTES
Physical Therapy Daily Treatment Note      Patient: Mary Deleon   : 1950  Diagnosis/ICD-10 Code:  Muscle strain [T14.8XXA]  Referring practitioner: SILVANO Reynolds  Date of Initial Visit: Type: THERAPY  Noted: 2023  Today's Date: 2023  Patient seen for 12 sessions         Mary Deleon reports: her pain continues to improve and she has noticed she has been able to do more in a day now that she has been able to do her exercises again and her medicine has been lined out as well.    Objective   See Exercise, Manual, and Modality Logs for complete treatment.     Assessment/Plan  Pt. Tolerates treatment well Pt. Is showing greater B LE mobility now that back pain has been reduced and is no longer having severe lat tightness after the most recent flare up.    Patient independent with HEP in 2 weeks - MET  Tolerate 10 min Nustep in 2 weeks - MET  Able to get in/out of car without increased pain in 2 weeks - MET  Able to ambulate independently without AD and minimal antalgia in 2 weeks - MET  Improve function as evidenced by Oswestry score of 20% or less in 12 weeks (24% impairment initially) - MET  Able to decrease pain 50% in 12 weeks - MET  Perform 10 reps of repeated sit to stand without arms in 30 seconds in 12 weeks - NOT MET, currently 6 reps in 30 sec    Progress strengthening /stabilization /functional activity           Timed:         Manual Therapy:    15     mins  24332;     Therapeutic Exercise:    10     mins  31620;       Therapeutic Activity:     10     mins  56164;       Un-Timed:  Electrical Stimulation:   15      mins  91712 ( );      Timed Treatment:   35   mins   Total Treatment:     50   mins    Lary Lujan PTA  Physical Therapist Assistant License #[id number]

## 2023-10-05 ENCOUNTER — TREATMENT (OUTPATIENT)
Dept: PHYSICAL THERAPY | Facility: CLINIC | Age: 73
End: 2023-10-05
Payer: MEDICARE

## 2023-10-05 DIAGNOSIS — T14.8XXA MUSCLE STRAIN: Primary | ICD-10-CM

## 2023-10-05 DIAGNOSIS — M54.42 ACUTE LEFT-SIDED LOW BACK PAIN WITH LEFT-SIDED SCIATICA: ICD-10-CM
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
## 2023-10-31 ENCOUNTER — ANTICOAGULATION VISIT (OUTPATIENT)
Dept: CARDIOLOGY | Facility: CLINIC | Age: 73
End: 2023-10-31
Payer: MEDICARE

## 2023-10-31 DIAGNOSIS — I48.21 PERMANENT ATRIAL FIBRILLATION: Primary | ICD-10-CM

## 2023-10-31 DIAGNOSIS — Z79.01 LONG TERM (CURRENT) USE OF ANTICOAGULANTS: ICD-10-CM

## 2023-10-31 LAB — INR PPP: 1.6

## 2023-11-20 LAB — INR PPP: 2.4

## 2023-11-21 ENCOUNTER — ANTICOAGULATION VISIT (OUTPATIENT)
Dept: CARDIOLOGY | Facility: CLINIC | Age: 73
End: 2023-11-21
Payer: MEDICARE

## 2023-11-21 DIAGNOSIS — I48.21 PERMANENT ATRIAL FIBRILLATION: Primary | ICD-10-CM

## 2023-11-21 DIAGNOSIS — Z79.01 LONG TERM (CURRENT) USE OF ANTICOAGULANTS: ICD-10-CM

## 2023-11-27 RX ORDER — ATORVASTATIN CALCIUM 40 MG/1
40 TABLET, FILM COATED ORAL
Qty: 90 TABLET | Refills: 0 | Status: SHIPPED | OUTPATIENT
Start: 2023-11-27

## 2023-11-27 NOTE — TELEPHONE ENCOUNTER
Rx Refill Note  Requested Prescriptions     Pending Prescriptions Disp Refills    atorvastatin (LIPITOR) 40 MG tablet [Pharmacy Med Name: Atorvastatin Calcium 40 MG Oral Tablet] 90 tablet 0     Sig: TAKE 1 TABLET BY MOUTH ONCE DAILY AT BEDTIME      Last office visit with prescribing clinician: 8/1/2023  Last telemedicine visit with prescribing clinician: Visit date not found   Next office visit with prescribing clinician: Visit date not found                         Would you like a call back once the refill request has been completed: [] Yes [] No    If the office needs to give you a call back, can they leave a voicemail: [] Yes [] No    Eliane Carcamo MA  11/27/23, 07:53 EST

## 2023-12-27 ENCOUNTER — OFFICE VISIT (OUTPATIENT)
Dept: CARDIOLOGY | Facility: CLINIC | Age: 73
End: 2023-12-27
Payer: MEDICARE

## 2023-12-27 VITALS
SYSTOLIC BLOOD PRESSURE: 131 MMHG | HEIGHT: 59 IN | WEIGHT: 184 LBS | BODY MASS INDEX: 37.09 KG/M2 | HEART RATE: 55 BPM | DIASTOLIC BLOOD PRESSURE: 59 MMHG | OXYGEN SATURATION: 96 %

## 2023-12-27 DIAGNOSIS — E78.5 DYSLIPIDEMIA: ICD-10-CM

## 2023-12-27 DIAGNOSIS — I10 PRIMARY HYPERTENSION: ICD-10-CM

## 2023-12-27 DIAGNOSIS — I48.0 PAROXYSMAL ATRIAL FIBRILLATION: Primary | ICD-10-CM

## 2023-12-27 DIAGNOSIS — Z95.1 PRESENCE OF AORTOCORONARY BYPASS GRAFT: ICD-10-CM

## 2023-12-27 DIAGNOSIS — I25.110 CORONARY ARTERY DISEASE INVOLVING NATIVE CORONARY ARTERY OF NATIVE HEART WITH UNSTABLE ANGINA PECTORIS: ICD-10-CM

## 2023-12-27 DIAGNOSIS — E11.9 TYPE 2 DIABETES MELLITUS WITHOUT COMPLICATION, WITHOUT LONG-TERM CURRENT USE OF INSULIN: ICD-10-CM

## 2023-12-27 RX ORDER — METOPROLOL SUCCINATE 25 MG/1
25 TABLET, EXTENDED RELEASE ORAL DAILY
Qty: 90 TABLET | Refills: 1 | Status: SHIPPED | OUTPATIENT
Start: 2023-12-27

## 2023-12-27 RX ORDER — AMLODIPINE BESYLATE 5 MG/1
5 TABLET ORAL DAILY
Qty: 90 TABLET | Refills: 1 | Status: SHIPPED | OUTPATIENT
Start: 2023-12-27

## 2023-12-27 NOTE — PROGRESS NOTES
CC-Atrial fibrillation, sleep apnea         Sub--73-year-old pleasant patient well-known to me comes in for follow-up.  Patient had prior history of bradycardia post cardioversion and she was on digoxin and amiodarone which have been stopped.  Patient has known history of coronary artery disease with bypass surgery in the past with concomitant Maze procedure.  She has obstructive sleep apnea with CPAP therapy and history of rheumatoid arthritis.  Previous cardiac catheterization revealed 2 out of 3 grafts patent with a vein graft to marginal being occluded and LIMA to LAD was patent.  She has history of hypertension hyperlipidemia and prior history of atrial flutter and AF ablation done in 2019.    Patient was on amlodipine which has been stopped and changed to lisinopril per Dr. Madden.  Patient does not feel well with lisinopril and she had to reduce dosage because of orthostatic Symptoms and she has noticed occasional chest pain.       Past Medical History:     Reviewed history from 10/18/2018 and no changes required:        Hypertension        Dyslipidemia        Coronary Artery Disease (05/25/2017)        C O P D        Chronic-Atrial Fibrillation        S/P CABG x3 Vessels        Atrial Flutter     Past Surgical History:     Reviewed history from 05/30/2019 and no changes required:        Cardiac Cath 2003  with narrowing distal LAD and prox Cx.         Hyster and BSO        Torn miniscus on left knee x2        Heart Catherization (05/24/2017)        C A B G: x3 Vessels, LIMA to LAD, SVG to PDA & SVG to OM3 (05/25/2017)        Cardioversion 9/24/18         Ablation 1/2/19 Dr. MAST           Physical Exam    General:      well developed, well nourished, in no acute distress.    Head:      normocephalic and atraumatic.    Eyes:      PERRL/EOM intact, conjunctivae and sclerae clear without nystagmus.    Neck:      no  thyromegaly, trachea central with normal respiratory effort  Lungs:      clear bilaterally to  auscultation.    Heart:       regular rate and rhythm, S1, S2 without murmurs, rubs, or gallops  Skin:      intact without lesions or rashes.    Psych:      alert and cooperative; normal mood and affect; normal attention span and concentration.          assessment plan    Prior history of AF and atrial flutter ablation with prior Maze procedure with recurrent AF needing cryoablation for AF with resolution and AF ablation performed in April 2019.  Obstructive sleep apnea treated  Hyperlipidemia on atorvastatin  Hypothyroidism supplemented with levothyroxine  Hypertension well-controlled with the lisinopril and metoprolol  Coronary artery disease stable without angina on aspirin and isosorbide  History of diabetes on metformin  Anticoagulation with warfarin  Previous HERBERT revealed left atrial appendage being patent on warfarin  Recent labs include a INR of 2.4, BUN/creatinine, hemoglobin and platelets are normal  Medications reviewed and follow-up appointments made  Metoprolol refilled  Stop lisinopril and restart amlodipine because of better tolerance and intermittent angina  Refill for metoprolol given and prescription for amlodipine given      ECG 12 Lead    Date/Time: 12/27/2023 2:09 PM  Performed by: Louie Rios MD    Authorized by: Louie Rios MD  Comparison: compared with previous ECG   Similar to previous ECG  Rhythm: sinus rhythm  Rate: normal  QRS axis: right      Electronically signed by Louie Rios MD, 12/27/23, 2:09 PM EST.

## 2024-01-05 ENCOUNTER — ANTICOAGULATION VISIT (OUTPATIENT)
Dept: CARDIOLOGY | Facility: CLINIC | Age: 74
End: 2024-01-05
Payer: MEDICARE

## 2024-01-05 ENCOUNTER — TELEPHONE (OUTPATIENT)
Dept: CARDIOLOGY | Facility: CLINIC | Age: 74
End: 2024-01-05
Payer: MEDICARE

## 2024-01-05 DIAGNOSIS — I48.21 PERMANENT ATRIAL FIBRILLATION: Primary | ICD-10-CM

## 2024-01-05 DIAGNOSIS — Z79.01 LONG TERM (CURRENT) USE OF ANTICOAGULANTS: ICD-10-CM

## 2024-01-05 LAB — INR PPP: 2.3

## 2024-01-05 NOTE — TELEPHONE ENCOUNTER
Caller: Mary Deleon    Relationship: Self    Best call back number: [phone]    What is the best time to reach you: ANYTIME     What was the call regarding: PT STATES SHE HAD MISSED A CALL FROM THIS NUMBER, NO MESSAGE AS TO WHAT IT WAS IN REGARDS TO. PLEASE ADVISE AND TRY REACHING PT BACK IF NEEDED.    Is it okay if the provider responds through MyChart: CALL BACK IF NEEDED

## 2024-02-05 NOTE — PROGRESS NOTES
Encounter Date:02/06/2024        Patient ID: Mary Deleon is a 73 y.o. female.      Chief Complaint:      History of Present Illness  73-year-old with past medical history of coronary artery disease status post CABG in 2017 with maze procedure, hypertension, hyperlipidemia, atrial flutter/fibrillation status post ablation x2 with Dr. Rios who presents for follow-up.  She continues to do well.  She has been working on lifestyle modifications to control her diabetes and has decreased her sugar intake.  Denies any chest pain or shortness of breath.  Feels that her chest pain has really come under control since she was started on isosorbide.  Remains active with no complaints.     ECG shows sinus bradycardia.  Heart rate 53 bpm, VT interval 118 ms, QRS duration 98 ms and QTc 407 ms    I had started her on lisinopril however she became very dizzy with small doses of lisinopril and has now started amlodipine with resolution of symptoms.       The following portions of the patient's history were reviewed and updated as appropriate: allergies, current medications, past family history, past medical history, past social history, past surgical history, and problem list.    Review of Systems   Constitutional: Negative for malaise/fatigue.   Cardiovascular:  Positive for leg swelling. Negative for chest pain, dyspnea on exertion and palpitations.   Respiratory:  Negative for cough and shortness of breath.    Gastrointestinal:  Negative for abdominal pain, nausea and vomiting.   Neurological:  Positive for dizziness and numbness (hands). Negative for focal weakness, headaches and light-headedness.   All other systems reviewed and are negative.        Current Outpatient Medications:     albuterol sulfate  (90 Base) MCG/ACT inhaler, Inhale 2 puffs Every 4 (Four) Hours As Needed., Disp: , Rfl:     amLODIPine (NORVASC) 5 MG tablet, Take 1 tablet by mouth Daily., Disp: 90 tablet, Rfl: 1    aspirin 81 MG EC tablet,  "Take 1 tablet by mouth Daily., Disp: , Rfl:     atorvastatin (LIPITOR) 40 MG tablet, TAKE 1 TABLET BY MOUTH ONCE DAILY AT BEDTIME, Disp: 90 tablet, Rfl: 0    BREO ELLIPTA 100-25 MCG/INH inhaler, Inhale 1 puff Daily., Disp: , Rfl: 5    Calcium Carb-Cholecalciferol (Calcium 500 + D) 500-3.125 MG-MCG tablet, Take  by mouth Daily., Disp: , Rfl:     Docusate Sodium (COLACE PO), Take 2 capsules by mouth 2 (two) times a day., Disp: , Rfl:     isosorbide dinitrate (ISORDIL) 10 MG tablet, TAKE 1 TABLET BY MOUTH THREE TIMES DAILY, Disp: 270 tablet, Rfl: 3    levothyroxine (SYNTHROID, LEVOTHROID) 25 MCG tablet, Take 1 tablet by mouth Daily., Disp: , Rfl:     metFORMIN (GLUCOPHAGE) 500 MG tablet, Take 1 tablet by mouth 2 (Two) Times a Day., Disp: , Rfl: 1    metoprolol succinate XL (TOPROL-XL) 25 MG 24 hr tablet, Take 1 tablet by mouth Daily. Pt requesting a 30 day supply, Disp: 90 tablet, Rfl: 1    montelukast (SINGULAIR) 10 MG tablet, Take 1 tablet by mouth Daily., Disp: , Rfl:     Omega-3 Fatty Acids (fish oil) 500 MG capsule capsule, Take 1 capsule by mouth Daily With Breakfast., Disp: , Rfl:     ONETOUCH DELICA LANCETS FINE misc, USE TO CHECK GLUCOSE TWICE DAILY, Disp: , Rfl: 5    ONETOUCH VERIO test strip, USE TO CHECK GLUCOSE TWICE DAILY, Disp: , Rfl: 3    Probiotic Product (PROBIOTIC-10 PO), Take  by mouth., Disp: , Rfl:     SPIRIVA RESPIMAT 1.25 MCG/ACT aerosol solution inhaler, Inhale 2 puffs Daily., Disp: , Rfl: 1    vitamin D3 125 MCG (5000 UT) capsule capsule, Take 1 capsule by mouth Daily., Disp: , Rfl:     warfarin (COUMADIN) 4 MG tablet, Take one tablet by mouth daily except Tuesday or as directed, Disp: 90 tablet, Rfl: 0    Current outpatient and discharge medications have been reconciled for the patient.  Reviewed by: Anand Madden MD       Allergies   Allergen Reactions    Omeprazole Hives    Sulfa Antibiotics Hives    Nitrofurantoin Other (See Comments)     .    Vancomycin Other (See Comments)     \"red man " "syndrome\" - turned red from head to toe    Codeine GI Intolerance    Naproxen Rash    Naproxen Sodium Rash    Tylenol With Codeine #3 [Acetaminophen-Codeine] GI Intolerance       Family History   Problem Relation Age of Onset    Heart disease Mother     Pneumonia Mother     Asthma Father     Alzheimer's disease Father     Heart disease Father        Past Surgical History:   Procedure Laterality Date    CARDIAC CATHETERIZATION N/A 01/11/2021    Procedure: Coronary angiography;  Surgeon: Al Garcia MD;  Location:  [facility];  Service: Cardiovascular;  Laterality: N/A;    CARDIAC CATHETERIZATION N/A 01/11/2021    Procedure: Saphenous Vein Graft;  Surgeon: Al Garcia MD;  Location:  [facility];  Service: Cardiovascular;  Laterality: N/A;    CATARACT EXTRACTION, BILATERAL Bilateral     HYSTERECTOMY      KNEE SURGERY         Past Medical History:   Diagnosis Date    Arthritis     Atrial fibrillation     COPD (chronic obstructive pulmonary disease)     Coronary artery disease     Depression     Diverticulitis     Hyperlipidemia     Hypertension     Mitral valve prolapse        Family History   Problem Relation Age of Onset    Heart disease Mother     Pneumonia Mother     Asthma Father     Alzheimer's disease Father     Heart disease Father        Social History     Socioeconomic History    Marital status:    Tobacco Use    Smoking status: Never     Passive exposure: Yes    Smokeless tobacco: Never   Vaping Use    Vaping Use: Never used   Substance and Sexual Activity    Alcohol use: No    Drug use: No    Sexual activity: Defer               Objective:       Physical Exam    /67   Pulse 68   Ht 152.4 cm (60\")   Wt 83.8 kg (184 lb 12.8 oz)   LMP  (LMP Unknown)   SpO2 97%   BMI 36.09 kg/m²   The patient is alert, oriented and in no distress.    Vital signs as noted above.    Head and neck revealed no carotid bruits or jugular venous " distension.  No thyromegaly or lymphadenopathy is present.    Lungs clear.  No wheezing.  Breath sounds are normal bilaterally.    Heart normal first and second heart sounds.  No murmur..  No pericardial rub is present.  No gallop is present.    Abdomen soft and nontender.  No organomegaly is present.    Extremities revealed good peripheral pulses without any pedal edema.    Skin warm and dry.    Musculoskeletal system is grossly normal.    CNS grossly normal.           Diagnosis Plan   1. Permanent atrial fibrillation        2. Long term (current) use of anticoagulants        3. Paroxysmal atrial fibrillation        4. Dyslipidemia        5. Presence of aortocoronary bypass graft        6. Primary hypertension        7. Coronary artery disease involving native coronary artery of native heart with unstable angina pectoris        8. Type 2 diabetes mellitus without complication, without long-term current use of insulin        9. Mixed hyperlipidemia        10. Essential hypertension        11. Coronary artery disease involving native coronary artery of native heart without angina pectoris        12. Obstructive sleep apnea        13. Vertigo        LAB RESULTS (LAST 7 DAYS)    CBC        BMP        CMP         BNP        TROPONIN        CoAg        Creatinine Clearance  CrCl cannot be calculated (Patient's most recent lab result is older than the maximum 30 days allowed.).    ABG        Radiology  No radiology results for the last day    EKG  Procedures    Stress test      Echocardiogram      Cardiac catheterization  Results for orders placed during the hospital encounter of 01/11/21    Cardiac Catheterization/Vascular Study    Narrative  CARDIAC CATHETERIZATION REPORT    DATE OF PROCEDURE: 1/11/2021      INDICATION FOR PROCEDURE: Abnormal stress test symptoms of unstable angina    PROCEDURE PERFORMED: Selective right and left coronary angiography  Aortocoronary bypass vein graft angiography  Left internal mammary  artery graft angiography    PROCEDURE COMMENTS:    All the risks and benefits explained with the patient informed consent was obtained from the patient.  Patient was brought to the cardiac catheterization laboratory placed on the table draped and prepped in the usual sterile fashion.  2% lidocaine was used to anesthetize the right groin area.  Using the modified Seldinger technique 6 Liberian sheath was inserted into the right femoral artery.    6 Liberian JL4 catheter was used to perform the selective left coronary angiography    6 Liberian JR4 catheter was used to perform the selective right coronary angiography  Aortocoronary bypass vein graft angiography  Left intramammary artery graft angiography        Patient tolerated procedure well without any immediate complications      FINDINGS:    1. HEMODYNAMICS:  , /70 mmHg.    2. LEFT VENTRICULOGRAPHY: Not done recently had echocardiogram    3. CORONARY ANGIOGRAPHY:  Dominance codominant  Left Main: Large-caliber vessel bifurcates into LAD circumflex artery 0% stenosis  LAD: Large-caliber vessel has proximal 70 to 80% disease gives rise to couple of diagonal arteries and that did not quite reach the apex at the level of the diagonal artery  Both the LAD and diagonal artery have a 70 to 80% stenosis  The LIMA to LAD was open supplying a very distal LAD which is a small caliber vessel and it was not supplying any diagonal arteries  CIRC: Large caliber codominant artery gives rise to marginal and large caliber lateral branch  The ostial marginal branch has 70% stenosis the vein graft marginal branch was occluded  Mid to distal marginal branch is severely diseased  After the marginal branch circumflex artery 50% stenosis lateral branches have no significant disease  RCA: Proximal RCA up to 70% stenosis there is a vein graft that was open supplying the PDA which is a large-caliber vessel    SUMMARY: 2 out of 3 grafts were open  The vein graft to the marginal branch was  occluded  LIMA to LAD was open but flow to the diagonal arteries were compromised  Films reviewed with interventional cardiology Dr. Griffin  RECOMMENDATIONS: We will try aggressive medical treatment  If continues to have symptoms patient may need high risk intervention to the LAD which will require long length stents plus or minus stent to the marginal branch which is also complicated intervention  Discussed with the patient  I called and left message with the patient's daughter          Assessment and Plan       Diagnoses and all orders for this visit:    1. Permanent atrial fibrillation (Primary)    2. Long term (current) use of anticoagulants    3. Paroxysmal atrial fibrillation    4. Dyslipidemia    5. Presence of aortocoronary bypass graft    6. Primary hypertension    7. Coronary artery disease involving native coronary artery of native heart with unstable angina pectoris  Overview:  Added automatically from request for surgery [id number]      8. Type 2 diabetes mellitus without complication, without long-term current use of insulin    9. Mixed hyperlipidemia    10. Essential hypertension    11. Coronary artery disease involving native coronary artery of native heart without angina pectoris    12. Obstructive sleep apnea    13. Vertigo         Atrial fibrillation  [id number] score is 5  Permanent  Status post maze  Currently on warfarin for stroke prevention with goal INR of 2-3.  Metoprolol for heart rate control.  Currently in sinus rhythm on exam     Coronary artery disease  Multivessel native CAD  Patent LIMA to LAD and SVG to RCA  Continue with aspirin, statin and beta-blocker  Also on amlodipine and isosorbide dinitrate.  No chest pain/angina.    Hypertension  Currently on amlodipine, isosorbide dinitrate, metoprolol succinate  Blood pressure is well-controlled on current medications  Unable to tolerate lisinopril due to episodes of dizziness  Have recommended compression socks for minimal leg edema.      Hyperlipidemia  Atorvastatin 40 mg  Yearly lipid panel by PCP Dr. Issa     Obesity  BMI 36.09.  She weighs 184 pounds  Diet, exercise, weight loss and lifestyle modification recommended.  Encouraged the use of CPAP     Diabetes  Continue metformin  Her most recent A1c was 6     Hypothyroidism   Currently on Synthroid

## 2024-02-06 ENCOUNTER — OFFICE VISIT (OUTPATIENT)
Dept: CARDIOLOGY | Facility: CLINIC | Age: 74
End: 2024-02-06
Payer: MEDICARE

## 2024-02-06 VITALS
WEIGHT: 184.8 LBS | HEART RATE: 68 BPM | OXYGEN SATURATION: 97 % | DIASTOLIC BLOOD PRESSURE: 67 MMHG | BODY MASS INDEX: 36.28 KG/M2 | SYSTOLIC BLOOD PRESSURE: 116 MMHG | HEIGHT: 60 IN

## 2024-02-06 DIAGNOSIS — I25.10 CORONARY ARTERY DISEASE INVOLVING NATIVE CORONARY ARTERY OF NATIVE HEART WITHOUT ANGINA PECTORIS: ICD-10-CM

## 2024-02-06 DIAGNOSIS — E11.9 TYPE 2 DIABETES MELLITUS WITHOUT COMPLICATION, WITHOUT LONG-TERM CURRENT USE OF INSULIN: ICD-10-CM

## 2024-02-06 DIAGNOSIS — E78.2 MIXED HYPERLIPIDEMIA: ICD-10-CM

## 2024-02-06 DIAGNOSIS — G47.33 OBSTRUCTIVE SLEEP APNEA: ICD-10-CM

## 2024-02-06 DIAGNOSIS — I48.0 PAROXYSMAL ATRIAL FIBRILLATION: ICD-10-CM

## 2024-02-06 DIAGNOSIS — Z79.01 LONG TERM (CURRENT) USE OF ANTICOAGULANTS: ICD-10-CM

## 2024-02-06 DIAGNOSIS — I10 PRIMARY HYPERTENSION: ICD-10-CM

## 2024-02-06 DIAGNOSIS — Z95.1 PRESENCE OF AORTOCORONARY BYPASS GRAFT: ICD-10-CM

## 2024-02-06 DIAGNOSIS — E78.5 DYSLIPIDEMIA: ICD-10-CM

## 2024-02-06 DIAGNOSIS — I48.21 PERMANENT ATRIAL FIBRILLATION: Primary | ICD-10-CM

## 2024-02-06 DIAGNOSIS — R42 VERTIGO: ICD-10-CM

## 2024-02-06 DIAGNOSIS — I10 ESSENTIAL HYPERTENSION: ICD-10-CM

## 2024-02-06 DIAGNOSIS — I25.110 CORONARY ARTERY DISEASE INVOLVING NATIVE CORONARY ARTERY OF NATIVE HEART WITH UNSTABLE ANGINA PECTORIS: ICD-10-CM

## 2024-02-21 RX ORDER — ATORVASTATIN CALCIUM 40 MG/1
40 TABLET, FILM COATED ORAL
Qty: 90 TABLET | Refills: 3 | Status: SHIPPED | OUTPATIENT
Start: 2024-02-21

## 2024-02-21 NOTE — TELEPHONE ENCOUNTER
Rx Refill Note  Requested Prescriptions     Pending Prescriptions Disp Refills    atorvastatin (LIPITOR) 40 MG tablet [Pharmacy Med Name: Atorvastatin Calcium 40 MG Oral Tablet] 90 tablet 0     Sig: TAKE 1 TABLET BY MOUTH ONCE DAILY AT BEDTIME      Last office visit with prescribing clinician: 2/6/2024   Last telemedicine visit with prescribing clinician: Visit date not found   Next office visit with prescribing clinician: 8/6/2024                         Would you like a call back once the refill request has been completed: [] Yes [] No    If the office needs to give you a call back, can they leave a voicemail: [] Yes [] No    Amparo Calderon MA  02/21/24, 14:55 EST

## 2024-04-17 ENCOUNTER — TELEPHONE (OUTPATIENT)
Dept: CARDIOLOGY | Facility: CLINIC | Age: 74
End: 2024-04-17
Payer: MEDICARE

## 2024-04-17 NOTE — TELEPHONE ENCOUNTER
I called patient and left VM for patient to call back and discuss her concerns regarding Levofloxacin.  Please advise about patient being on Levofloxacin-ordered by her urologist (Dr. Caraballo)-and if it is OK to take from a cardiovascular standpoint.    Ailyn

## 2024-04-17 NOTE — TELEPHONE ENCOUNTER
Caller: Mary Deleon    Relationship to patient: Self    Best call back number: [phone]    Patient is needing: PATIENT WAS GIVEN LEVOFLOXACIN 500 MG FROM DR FAGAN FOR UROLOGY. PATIENT IS CONCERNED WITH SIDE EFFECTS AND THIS IS MAKING SURE THIS OK TO TAKE THROUGH CARDIOVASCULAR STAND POINT.

## 2024-04-18 NOTE — TELEPHONE ENCOUNTER
Please advise patient that levofloxacin can interact with her warfarin and cause her blood to be too thin (elevated INR level). She should ask the urologist about taking a different antibiotic. Otherwise, she may need a lower dose of warfarin, and have her INR checked every few days while on levofloxacin.

## 2024-04-18 NOTE — TELEPHONE ENCOUNTER
Placed a call to patient and informed her (per orders from SILVANO Mittal)  that patient should discuss with her urologist  about taking a different antibiotic since Levofloxacin can interact with her Warfarin and cause elevated INR-or to adjust her dose of Warfarin and have more frequent INR checks. Patient has her PT/INR checked in [place].  Patient verbalizes understanding and stated she is also concerned after reviewing the possible side effects of Levofloxacin and stated she is very sensitive with meds.  Once again-advised that patient contact Dr. Caraballo's office about taking a different antibiotic/alternative to Levofloxacin. Patient verbalizes understanding and will contact Dr. Caraballo's office.

## 2024-04-22 LAB — INR PPP: 1.9

## 2024-04-23 ENCOUNTER — ANTICOAGULATION VISIT (OUTPATIENT)
Dept: CARDIOLOGY | Facility: CLINIC | Age: 74
End: 2024-04-23
Payer: MEDICARE

## 2024-04-23 DIAGNOSIS — Z79.01 LONG TERM (CURRENT) USE OF ANTICOAGULANTS: ICD-10-CM

## 2024-04-23 DIAGNOSIS — I48.21 PERMANENT ATRIAL FIBRILLATION: Primary | ICD-10-CM

## 2024-04-23 NOTE — PROGRESS NOTES
Spoke to pt. She has been on antibiotic for 2 days, has 5 days to go. She is being treated for a UTI. Advised pt to continue current dosage of Warfarin. If she develops any bleeding of nose, gums or in urine she is to hold Warfarin and repeat INR. Otherwise, recheck INR in a month. Kamila

## 2024-06-06 ENCOUNTER — ANTICOAGULATION VISIT (OUTPATIENT)
Dept: CARDIOLOGY | Facility: CLINIC | Age: 74
End: 2024-06-06
Payer: MEDICARE

## 2024-06-06 DIAGNOSIS — I48.21 PERMANENT ATRIAL FIBRILLATION: Primary | ICD-10-CM

## 2024-06-06 DIAGNOSIS — Z79.01 LONG TERM (CURRENT) USE OF ANTICOAGULANTS: ICD-10-CM

## 2024-06-06 LAB — INR PPP: 1.9

## 2024-06-11 RX ORDER — AMLODIPINE BESYLATE 5 MG/1
5 TABLET ORAL DAILY
Qty: 90 TABLET | Refills: 1 | Status: SHIPPED | OUTPATIENT
Start: 2024-06-11

## 2024-06-11 NOTE — TELEPHONE ENCOUNTER
Caller: Mary Deleon    Relationship: Self    Best call back number: [phone]    Requested Prescriptions:   Requested Prescriptions      No prescriptions requested or ordered in this encounter        Pharmacy where request should be sent: [facility] [address] [place], [place] - [address] - [phone]  - [phone]     Last office visit with prescribing clinician: 12/27/2023   Last telemedicine visit with prescribing clinician: Visit date not found   Next office visit with prescribing clinician: 7/1/2024         Does the patient have less than a 3 day supply:  [x] Yes  [] No    Would you like a call back once the refill request has been completed: [x] Yes [] No    If the office needs to give you a call back, can they leave a voicemail: [x] Yes [] No    Chacha Pool Rep   06/11/24 14:48 EDT

## 2024-06-21 ENCOUNTER — TELEPHONE (OUTPATIENT)
Dept: CARDIOLOGY | Facility: CLINIC | Age: 74
End: 2024-06-21
Payer: MEDICARE

## 2024-06-21 NOTE — TELEPHONE ENCOUNTER
Caller: Mary Deleon    Relationship to patient: Self    Best call back number: [phone]    Chief complaint: ACTIVE CHEST TIGHTNESS AND PAIN, DIZZINESS, WEAK. /90, 146/79, 160/85 ALL FROM TODAY    Patient directed to call 911 or go to their nearest emergency room.     Patient verbalized understanding: [x] Yes  [] No  If no, why?    Additional notes: ADVISED TO GO TO THE ER, SHE AGREED BUT SHE WANTS TO STILL SPEAK WITH CLINICAL.

## 2024-06-21 NOTE — TELEPHONE ENCOUNTER
Left voicemail urging patient to go to the ER, call 911 if she does not have someone to drive her.

## 2024-07-15 ENCOUNTER — OFFICE VISIT (OUTPATIENT)
Dept: CARDIOLOGY | Facility: CLINIC | Age: 74
End: 2024-07-15
Payer: MEDICARE

## 2024-07-15 VITALS
HEIGHT: 60 IN | HEART RATE: 65 BPM | BODY MASS INDEX: 36.52 KG/M2 | SYSTOLIC BLOOD PRESSURE: 123 MMHG | WEIGHT: 186 LBS | DIASTOLIC BLOOD PRESSURE: 77 MMHG | OXYGEN SATURATION: 97 %

## 2024-07-15 DIAGNOSIS — Z98.890 HISTORY OF CARDIOVASCULAR SURGERY: ICD-10-CM

## 2024-07-15 DIAGNOSIS — R60.0 BILATERAL LEG EDEMA: Primary | ICD-10-CM

## 2024-07-15 DIAGNOSIS — R06.02 SHORTNESS OF BREATH: ICD-10-CM

## 2024-07-15 PROCEDURE — 1160F RVW MEDS BY RX/DR IN RCRD: CPT | Performed by: NURSE PRACTITIONER

## 2024-07-15 PROCEDURE — 93000 ELECTROCARDIOGRAM COMPLETE: CPT | Performed by: NURSE PRACTITIONER

## 2024-07-15 PROCEDURE — 99214 OFFICE O/P EST MOD 30 MIN: CPT | Performed by: NURSE PRACTITIONER

## 2024-07-15 PROCEDURE — 3074F SYST BP LT 130 MM HG: CPT | Performed by: NURSE PRACTITIONER

## 2024-07-15 PROCEDURE — 3078F DIAST BP <80 MM HG: CPT | Performed by: NURSE PRACTITIONER

## 2024-07-15 PROCEDURE — 1159F MED LIST DOCD IN RCRD: CPT | Performed by: NURSE PRACTITIONER

## 2024-07-15 NOTE — PROGRESS NOTES
[facility] CARDIOLOGY      REASON FOR FOLLOW-UP:  Follow-up A-fib/flutter          Chief Complaint   Patient presents with    Heart Problem         Dear Yuliana Issa MD        History of Present Illness   Mary Deleon is a 73-year-old female known to Dr. Rios with history of fib/flutter status post prior cardioversion, previous cardiac catheterization revealed 2 out of 3 grafts patent with a vein graft to marginal being occluded and LIMA to LAD was patent. She has history of hypertension, hyperlipidemia and prior history of atrial flutter and AF ablation x 2 with last procedure in 2019.  She presents today in follow-up for the above diagnoses.    Today, the patient denies any chest pain, pressure, tightness or palpitations.  She reports some mild shortness of breath with mild-moderate lower extremity edema intermittently over the past year.  She reports some intermittent sharp chest pains without radiation or associated symptoms.    6/5/2024: INR 1.9.  She denies any abnormal bleeding.      ASSESSMENT:  Paroxysmal atrial fibrillation  Long-term use of anticoagulants  Elevated chads vascular score  History of prior CABG  Lower extremity edema  Shortness of breath    PLAN:  I will check 2D echocardiogram.  Continue current CV plan of care to include CCB, aspirin, statin, nitrate, BB  Continue warfarin for stroke prevention from A-fib  Follow-up with Dr. Rios        CHF Guideline Directed Medical Therapy  Beta Blocker:   ARNI/ACE/ARB:   SGLT 2 inhibitors:   Diuretics:   Aldosterone Antagonist:   Vasodilators & Nitrates:       Diagnoses and all orders for this visit:    1. Bilateral leg edema (Primary)  -     Adult Transthoracic Echo Complete w/ Color, Spectral and Contrast if necessary per protocol; Future    2. History of cardiovascular surgery  -     Adult Transthoracic Echo Complete w/ Color, Spectral and Contrast if necessary per protocol; Future          The following  portions of the patient's history were reviewed and updated as appropriate: allergies, current medications, past family history, past medical history, past social history, past surgical history, and problem list.    REVIEW OF SYSTEMS:    ROS    Vitals:    07/15/24 1446   BP: 123/77   Pulse: 65   SpO2: 97%         PHYSICAL EXAM:    General: Alert, cooperative, no distress, appears stated age  Head:  Normocephalic, atraumatic, mucous membranes moist  Eyes:  Conjunctiva/corneas clear, EOM's intact     Neck:  Supple,  no JVD or bruit     Lungs: Clear to auscultation bilaterally, no wheezes rhonchi rales are noted  Chest wall: No tenderness  Musculoskeletal:   Ambulates freely without assistance  Heart::  Regular rate and rhythm, S1 and S2 normal, no murmur, rub or gallop  Abdomen: Soft, non-tender, nondistended, bowel sounds active, no abdominal bruit  Extremities: No cyanosis, clubbing, or edema   Pulses: 2+ and symmetric all extremities  Skin:  No rashes or lesions  Neuro/psych: A&O x3. CN II through XII are grossly intact with appropriate affect        Past Medical History:   Diagnosis Date    Arthritis     Atrial fibrillation     COPD (chronic obstructive pulmonary disease)     Coronary artery disease     Depression     Diverticulitis     Hyperlipidemia     Hypertension     Mitral valve prolapse        Past Surgical History:   Procedure Laterality Date    CARDIAC CATHETERIZATION N/A 01/11/2021    Procedure: Coronary angiography;  Surgeon: Al Garcia MD;  Location: [facility] CATH INVASIVE LOCATION;  Service: Cardiovascular;  Laterality: N/A;    CARDIAC CATHETERIZATION N/A 01/11/2021    Procedure: Saphenous Vein Graft;  Surgeon: Al Garcia MD;  Location: [facility] CATH INVASIVE LOCATION;  Service: Cardiovascular;  Laterality: N/A;    CATARACT EXTRACTION, BILATERAL Bilateral     HYSTERECTOMY      KNEE SURGERY           Current Outpatient Medications:     albuterol sulfate  (90 Base)  "MCG/ACT inhaler, Inhale 2 puffs Every 4 (Four) Hours As Needed., Disp: , Rfl:     amLODIPine (NORVASC) 5 MG tablet, Take 1 tablet by mouth Daily., Disp: 90 tablet, Rfl: 1    aspirin 81 MG EC tablet, Take 1 tablet by mouth Daily., Disp: , Rfl:     atorvastatin (LIPITOR) 40 MG tablet, TAKE 1 TABLET BY MOUTH ONCE DAILY AT BEDTIME, Disp: 90 tablet, Rfl: 3    BREO ELLIPTA 100-25 MCG/INH inhaler, Inhale 1 puff Daily., Disp: , Rfl: 5    Calcium Carb-Cholecalciferol (Calcium 500 + D) 500-3.125 MG-MCG tablet, Take  by mouth Daily., Disp: , Rfl:     Docusate Sodium (COLACE PO), Take 2 capsules by mouth 2 (two) times a day., Disp: , Rfl:     isosorbide dinitrate (ISORDIL) 10 MG tablet, TAKE 1 TABLET BY MOUTH THREE TIMES DAILY, Disp: 270 tablet, Rfl: 3    levothyroxine (SYNTHROID, LEVOTHROID) 25 MCG tablet, Take 1 tablet by mouth Daily., Disp: , Rfl:     metFORMIN (GLUCOPHAGE) 500 MG tablet, Take 1 tablet by mouth 2 (Two) Times a Day., Disp: , Rfl: 1    metoprolol succinate XL (TOPROL-XL) 25 MG 24 hr tablet, Take 1 tablet by mouth Daily. Pt requesting a 30 day supply, Disp: 90 tablet, Rfl: 1    montelukast (SINGULAIR) 10 MG tablet, Take 1 tablet by mouth Daily., Disp: , Rfl:     ONETOUCH DELICA LANCETS FINE misc, USE TO CHECK GLUCOSE TWICE DAILY, Disp: , Rfl: 5    ONETOUCH VERIO test strip, USE TO CHECK GLUCOSE TWICE DAILY, Disp: , Rfl: 3    Probiotic Product (PROBIOTIC-10 PO), Take  by mouth., Disp: , Rfl:     SPIRIVA RESPIMAT 1.25 MCG/ACT aerosol solution inhaler, Inhale 2 puffs Daily., Disp: , Rfl: 1    vitamin D3 125 MCG (5000 UT) capsule capsule, Take 1 capsule by mouth Daily., Disp: , Rfl:     warfarin (COUMADIN) 4 MG tablet, Take one tablet by mouth daily except Tuesday or as directed, Disp: 90 tablet, Rfl: 0    Allergies   Allergen Reactions    Omeprazole Hives    Sulfa Antibiotics Hives    Nitrofurantoin Other (See Comments)     .    Vancomycin Other (See Comments)     \"red man syndrome\" - turned red from head to toe " "   Codeine GI Intolerance    Naproxen Rash    Naproxen Sodium Rash    Tylenol With Codeine #3 [Acetaminophen-Codeine] GI Intolerance       Family History   Problem Relation Age of Onset    Heart disease Mother     Pneumonia Mother     Asthma Father     Alzheimer's disease Father     Heart disease Father        Social History     Tobacco Use    Smoking status: Never     Passive exposure: Yes    Smokeless tobacco: Never   Substance Use Topics    Alcohol use: No           Current Electrocardiogram:    ECG 12 Lead    Date/Time: 7/15/2024 1:07 PM  Performed by: Tiffany Corral APRN    Authorized by: Tiffany Corral APRN  Comparison: compared with previous ECG from 12/27/2023  Similar to previous ECG  Rhythm: sinus rhythm  BPM: 65  QRS axis: right              EMR Dragon/Transcription:   \"Dictated utilizing Dragon dictation\".     Copied text in this note has been reviewed by me and is accurate as of 07/16/24.    "

## 2024-07-22 ENCOUNTER — TELEPHONE (OUTPATIENT)
Dept: CARDIOLOGY | Facility: CLINIC | Age: 74
End: 2024-07-22
Payer: MEDICARE

## 2024-07-22 ENCOUNTER — ANTICOAGULATION VISIT (OUTPATIENT)
Dept: CARDIOLOGY | Facility: CLINIC | Age: 74
End: 2024-07-22
Payer: MEDICARE

## 2024-07-22 DIAGNOSIS — I48.21 PERMANENT ATRIAL FIBRILLATION: Primary | ICD-10-CM

## 2024-07-22 DIAGNOSIS — Z79.01 LONG TERM (CURRENT) USE OF ANTICOAGULANTS: ICD-10-CM

## 2024-07-22 LAB — INR PPP: 2.7

## 2024-07-22 NOTE — TELEPHONE ENCOUNTER
Called pt gave centralized scheduling number to schedule ECHO ordered by Glory Corral. Advised we do accept insurance per [facility] Office mgr. Pt will call to schedule echo tomorrow

## 2024-07-29 ENCOUNTER — HOSPITAL ENCOUNTER (OUTPATIENT)
Dept: CARDIOLOGY | Facility: HOSPITAL | Age: 74
Discharge: HOME | End: 2024-07-29
Admitting: NURSE PRACTITIONER
Payer: MEDICARE

## 2024-07-29 VITALS
BODY MASS INDEX: 36.53 KG/M2 | SYSTOLIC BLOOD PRESSURE: 159 MMHG | HEIGHT: 60 IN | WEIGHT: 186.07 LBS | DIASTOLIC BLOOD PRESSURE: 65 MMHG

## 2024-07-29 DIAGNOSIS — R60.0 BILATERAL LEG EDEMA: ICD-10-CM

## 2024-07-29 DIAGNOSIS — Z98.890 HISTORY OF CARDIOVASCULAR SURGERY: ICD-10-CM

## 2024-07-29 LAB
BH CV ECHO MEAS - ACS: 1.79 CM
BH CV ECHO MEAS - AI P1/2T: 860.6 MSEC
BH CV ECHO MEAS - AO MAX PG: 8.5 MMHG
BH CV ECHO MEAS - AO MEAN PG: 4.2 MMHG
BH CV ECHO MEAS - AO ROOT DIAM: 2.9 CM
BH CV ECHO MEAS - AO V2 MAX: 145.7 CM/SEC
BH CV ECHO MEAS - AO V2 VTI: 37.8 CM
BH CV ECHO MEAS - AVA(I,D): 2.02 CM2
BH CV ECHO MEAS - EDV(CUBED): 134.1 ML
BH CV ECHO MEAS - EDV(MOD-SP4): 74.7 ML
BH CV ECHO MEAS - EF(MOD-BP): 65 %
BH CV ECHO MEAS - EF(MOD-SP4): 64.1 %
BH CV ECHO MEAS - ESV(CUBED): 35.8 ML
BH CV ECHO MEAS - ESV(MOD-SP4): 26.8 ML
BH CV ECHO MEAS - FS: 35.6 %
BH CV ECHO MEAS - IVS/LVPW: 0.98 CM
BH CV ECHO MEAS - IVSD: 1.01 CM
BH CV ECHO MEAS - LA DIMENSION: 4.4 CM
BH CV ECHO MEAS - LV DIASTOLIC VOL/BSA (35-75): 41.3 CM2
BH CV ECHO MEAS - LV MASS(C)D: 194.8 GRAMS
BH CV ECHO MEAS - LV MAX PG: 3.1 MMHG
BH CV ECHO MEAS - LV MEAN PG: 1.45 MMHG
BH CV ECHO MEAS - LV SYSTOLIC VOL/BSA (12-30): 14.8 CM2
BH CV ECHO MEAS - LV V1 MAX: 87.7 CM/SEC
BH CV ECHO MEAS - LV V1 VTI: 22.7 CM
BH CV ECHO MEAS - LVIDD: 5.1 CM
BH CV ECHO MEAS - LVIDS: 3.3 CM
BH CV ECHO MEAS - LVOT AREA: 3.4 CM2
BH CV ECHO MEAS - LVOT DIAM: 2.07 CM
BH CV ECHO MEAS - LVPWD: 1.03 CM
BH CV ECHO MEAS - MR MAX PG: 116.5 MMHG
BH CV ECHO MEAS - MR MAX VEL: 539.4 CM/SEC
BH CV ECHO MEAS - MV A MAX VEL: 33.4 CM/SEC
BH CV ECHO MEAS - MV DEC SLOPE: 649.6 CM/SEC2
BH CV ECHO MEAS - MV DEC TIME: 0.18 SEC
BH CV ECHO MEAS - MV E MAX VEL: 114.4 CM/SEC
BH CV ECHO MEAS - MV E/A: 3.4
BH CV ECHO MEAS - MV MAX PG: 7.3 MMHG
BH CV ECHO MEAS - MV MEAN PG: 1.82 MMHG
BH CV ECHO MEAS - MV V2 VTI: 37.4 CM
BH CV ECHO MEAS - MVA(VTI): 2.05 CM2
BH CV ECHO MEAS - PA ACC TIME: 0.11 SEC
BH CV ECHO MEAS - PA V2 MAX: 96.7 CM/SEC
BH CV ECHO MEAS - PI END-D VEL: 94.3 CM/SEC
BH CV ECHO MEAS - PULM A REVS DUR: 0.1 SEC
BH CV ECHO MEAS - PULM A REVS VEL: 19.9 CM/SEC
BH CV ECHO MEAS - PULM DIAS VEL: 95.7 CM/SEC
BH CV ECHO MEAS - PULM S/D: 0.5
BH CV ECHO MEAS - PULM SYS VEL: 48.3 CM/SEC
BH CV ECHO MEAS - RAP SYSTOLE: 3 MMHG
BH CV ECHO MEAS - RVSP: 38 MMHG
BH CV ECHO MEAS - SV(LVOT): 76.5 ML
BH CV ECHO MEAS - SV(MOD-SP4): 47.9 ML
BH CV ECHO MEAS - SVI(LVOT): 42.3 ML/M2
BH CV ECHO MEAS - SVI(MOD-SP4): 26.5 ML/M2
BH CV ECHO MEAS - TAPSE (>1.6): 1.8 CM
BH CV ECHO MEAS - TR MAX PG: 35 MMHG
BH CV ECHO MEAS - TR MAX VEL: 285.9 CM/SEC
BH CV XLRA - RV BASE: 4 CM
BH CV XLRA - RV MID: 2.9 CM

## 2024-07-29 PROCEDURE — 93306 TTE W/DOPPLER COMPLETE: CPT | Performed by: INTERNAL MEDICINE

## 2024-07-29 PROCEDURE — 93306 TTE W/DOPPLER COMPLETE: CPT

## 2024-08-03 NOTE — PROGRESS NOTES
Encounter Date:08/06/2024        Patient ID: Mary Deleon is a 73 y.o. female.      Chief Complaint:      History of Present Illness  73-year-old with past medical history of coronary artery disease status post CABG in 2017 with maze procedure, hypertension, hyperlipidemia, atrial flutter/fibrillation status post ablation x2 with Dr. Rios who presents for follow-up.  She continues to do well.  She has been working on lifestyle modifications to control her diabetes and has decreased her sugar intake.  Denies any chest pain or shortness of breath.  Feels that her chest pain has really come under control since she was started on isosorbide.  Remains active with no complaints.     I had started her on lisinopril however she became very dizzy with small doses of lisinopril and has now started amlodipine with resolution of symptoms.    Current cardiac medications include amlodipine, aspirin, atorvastatin, Imdur, Toprol-XL and warfarin  Today she complains of significant leg edema.    The following portions of the patient's history were reviewed and updated as appropriate: allergies, current medications, past family history, past medical history, past social history, past surgical history, and problem list.    Review of Systems   Constitutional: Negative for malaise/fatigue.   Cardiovascular:  Positive for leg swelling. Negative for chest pain, dyspnea on exertion and palpitations.   Respiratory:  Negative for cough and shortness of breath.    Gastrointestinal:  Negative for abdominal pain, nausea and vomiting.   Neurological:  Positive for light-headedness and numbness. Negative for dizziness, focal weakness and headaches.   All other systems reviewed and are negative.        Current Outpatient Medications:     albuterol sulfate  (90 Base) MCG/ACT inhaler, Inhale 2 puffs Every 4 (Four) Hours As Needed., Disp: , Rfl:     amLODIPine (NORVASC) 5 MG tablet, Take 1 tablet by mouth Daily., Disp: 90 tablet,  "Rfl: 1    aspirin 81 MG EC tablet, Take 1 tablet by mouth Daily., Disp: , Rfl:     atorvastatin (LIPITOR) 40 MG tablet, TAKE 1 TABLET BY MOUTH ONCE DAILY AT BEDTIME, Disp: 90 tablet, Rfl: 3    BREO ELLIPTA 100-25 MCG/INH inhaler, Inhale 1 puff Daily., Disp: , Rfl: 5    Calcium Carb-Cholecalciferol (Calcium 500 + D) 500-3.125 MG-MCG tablet, Take  by mouth Daily., Disp: , Rfl:     Docusate Sodium (COLACE PO), Take 2 capsules by mouth 2 (two) times a day., Disp: , Rfl:     isosorbide dinitrate (ISORDIL) 10 MG tablet, TAKE 1 TABLET BY MOUTH THREE TIMES DAILY, Disp: 270 tablet, Rfl: 3    levothyroxine (SYNTHROID, LEVOTHROID) 25 MCG tablet, Take 1 tablet by mouth Daily., Disp: , Rfl:     metFORMIN (GLUCOPHAGE) 500 MG tablet, Take 1 tablet by mouth 2 (Two) Times a Day., Disp: , Rfl: 1    metoprolol succinate XL (TOPROL-XL) 25 MG 24 hr tablet, Take 1 tablet by mouth Daily. Pt requesting a 30 day supply, Disp: 90 tablet, Rfl: 1    montelukast (SINGULAIR) 10 MG tablet, Take 1 tablet by mouth Daily., Disp: , Rfl:     ONETOUCH DELICA LANCETS FINE misc, USE TO CHECK GLUCOSE TWICE DAILY, Disp: , Rfl: 5    ONETOUCH VERIO test strip, USE TO CHECK GLUCOSE TWICE DAILY, Disp: , Rfl: 3    Probiotic Product (PROBIOTIC-10 PO), Take  by mouth., Disp: , Rfl:     SPIRIVA RESPIMAT 1.25 MCG/ACT aerosol solution inhaler, Inhale 2 puffs Daily., Disp: , Rfl: 1    vitamin D3 125 MCG (5000 UT) capsule capsule, Take 1 capsule by mouth Daily., Disp: , Rfl:     warfarin (COUMADIN) 4 MG tablet, Take one tablet by mouth daily except Tuesday or as directed, Disp: 90 tablet, Rfl: 0    Current outpatient and discharge medications have been reconciled for the patient.  Reviewed by: Anand Madden MD       Allergies   Allergen Reactions    Omeprazole Hives    Sulfa Antibiotics Hives    Lisinopril Dizziness    Nitrofurantoin Other (See Comments)     .    Vancomycin Other (See Comments)     \"red man syndrome\" - turned red from head to toe    Codeine GI " "Intolerance    Naproxen Rash    Naproxen Sodium Rash    Tylenol With Codeine #3 [Acetaminophen-Codeine] GI Intolerance       Family History   Problem Relation Age of Onset    Heart disease Mother     Pneumonia Mother     Asthma Father     Alzheimer's disease Father     Heart disease Father        Past Surgical History:   Procedure Laterality Date    CARDIAC CATHETERIZATION N/A 01/11/2021    Procedure: Coronary angiography;  Surgeon: Al Garcia MD;  Location:  [facility] CATH INVASIVE LOCATION;  Service: Cardiovascular;  Laterality: N/A;    CARDIAC CATHETERIZATION N/A 01/11/2021    Procedure: Saphenous Vein Graft;  Surgeon: Al Garcia MD;  Location: [facility] CATH INVASIVE LOCATION;  Service: Cardiovascular;  Laterality: N/A;    CATARACT EXTRACTION, BILATERAL Bilateral     HYSTERECTOMY      KNEE SURGERY         Past Medical History:   Diagnosis Date    Arthritis     Atrial fibrillation     COPD (chronic obstructive pulmonary disease)     Coronary artery disease     Depression     Diverticulitis     Hyperlipidemia     Hypertension     Mitral valve prolapse        Family History   Problem Relation Age of Onset    Heart disease Mother     Pneumonia Mother     Asthma Father     Alzheimer's disease Father     Heart disease Father        Social History     Socioeconomic History    Marital status:    Tobacco Use    Smoking status: Never     Passive exposure: Yes    Smokeless tobacco: Never   Vaping Use    Vaping status: Never Used   Substance and Sexual Activity    Alcohol use: No    Drug use: No    Sexual activity: Defer               Objective:       Physical Exam    /68   Pulse 63   Ht 149.9 cm (59\")   Wt 87 kg (191 lb 12.8 oz)   LMP  (LMP Unknown)   SpO2 96%   BMI 38.74 kg/m²   The patient is alert, oriented and in no distress.    Vital signs as noted above.    Head and neck revealed no carotid bruits or jugular venous distension.  No thyromegaly or lymphadenopathy is " present.    Lungs clear.  No wheezing.  Breath sounds are normal bilaterally.    Heart normal first and second heart sounds.  No murmur..  No pericardial rub is present.  No gallop is present.    Abdomen soft and nontender.  No organomegaly is present.    Extremities revealed good peripheral pulses without any pedal edema.    Skin warm and dry.    Musculoskeletal system is grossly normal.    CNS grossly normal.           Diagnosis Plan   1. Permanent atrial fibrillation        2. History of cardiovascular surgery        3. Dyslipidemia        4. Coronary artery disease involving native coronary artery of native heart with unstable angina pectoris        5. Essential hypertension        6. Type 2 diabetes mellitus without complication, without long-term current use of insulin        7. Coronary artery disease involving native coronary artery of native heart without angina pectoris        8. Mixed hyperlipidemia        9. Primary hypertension        10. Bilateral leg edema        11. Shortness of breath        12. Obstructive sleep apnea        13. Long term (current) use of anticoagulants        LAB RESULTS (LAST 7 DAYS)    CBC        BMP        CMP         BNP        TROPONIN        CoAg        Creatinine Clearance  CrCl cannot be calculated (Patient's most recent lab result is older than the maximum 30 days allowed.).    ABG        Radiology  No radiology results for the last day    EKG  Procedures    Stress test      Echocardiogram  Results for orders placed during the hospital encounter of 07/29/24    Adult Transthoracic Echo Complete w/ Color, Spectral and Contrast if necessary per protocol    Interpretation Summary    Left ventricular systolic function is normal. Calculated left ventricular EF = 65% Left ventricular ejection fraction appears to be 61 - 65%.    Left ventricular diastolic dysfunction is noted.    The left atrial cavity is dilated.    Moderate mitral valve regurgitation is present.    Estimated  right ventricular systolic pressure from tricuspid regurgitation is mildly elevated (35-45 mmHg).      Cardiac catheterization  Results for orders placed during the hospital encounter of 01/11/21    Cardiac Catheterization/Vascular Study    Narrative  CARDIAC CATHETERIZATION REPORT    DATE OF PROCEDURE: 1/11/2021      INDICATION FOR PROCEDURE: Abnormal stress test symptoms of unstable angina    PROCEDURE PERFORMED: Selective right and left coronary angiography  Aortocoronary bypass vein graft angiography  Left internal mammary artery graft angiography    PROCEDURE COMMENTS:    All the risks and benefits explained with the patient informed consent was obtained from the patient.  Patient was brought to the cardiac catheterization laboratory placed on the table draped and prepped in the usual sterile fashion.  2% lidocaine was used to anesthetize the right groin area.  Using the modified Seldinger technique 6 Sudanese sheath was inserted into the right femoral artery.    6 Sudanese JL4 catheter was used to perform the selective left coronary angiography    6 Sudanese JR4 catheter was used to perform the selective right coronary angiography  Aortocoronary bypass vein graft angiography  Left intramammary artery graft angiography        Patient tolerated procedure well without any immediate complications      FINDINGS:    1. HEMODYNAMICS:  , /70 mmHg.    2. LEFT VENTRICULOGRAPHY: Not done recently had echocardiogram    3. CORONARY ANGIOGRAPHY:  Dominance codominant  Left Main: Large-caliber vessel bifurcates into LAD circumflex artery 0% stenosis  LAD: Large-caliber vessel has proximal 70 to 80% disease gives rise to couple of diagonal arteries and that did not quite reach the apex at the level of the diagonal artery  Both the LAD and diagonal artery have a 70 to 80% stenosis  The LIMA to LAD was open supplying a very distal LAD which is a small caliber vessel and it was not supplying any diagonal arteries  CIRC: Large  caliber codominant artery gives rise to marginal and large caliber lateral branch  The ostial marginal branch has 70% stenosis the vein graft marginal branch was occluded  Mid to distal marginal branch is severely diseased  After the marginal branch circumflex artery 50% stenosis lateral branches have no significant disease  RCA: Proximal RCA up to 70% stenosis there is a vein graft that was open supplying the PDA which is a large-caliber vessel    SUMMARY: 2 out of 3 grafts were open  The vein graft to the marginal branch was occluded  LIMA to LAD was open but flow to the diagonal arteries were compromised  Films reviewed with interventional cardiology Dr. Griffin  RECOMMENDATIONS: We will try aggressive medical treatment  If continues to have symptoms patient may need high risk intervention to the LAD which will require long length stents plus or minus stent to the marginal branch which is also complicated intervention            Assessment and Plan       Diagnoses and all orders for this visit:    1. Permanent atrial fibrillation (Primary)    2. History of cardiovascular surgery    3. Dyslipidemia    4. Coronary artery disease involving native coronary artery of native heart with unstable angina pectoris  Overview:  Added automatically from request for surgery [id number]      5. Essential hypertension    6. Type 2 diabetes mellitus without complication, without long-term current use of insulin    7. Coronary artery disease involving native coronary artery of native heart without angina pectoris    8. Mixed hyperlipidemia    9. Primary hypertension    10. Bilateral leg edema    11. Shortness of breath    12. Obstructive sleep apnea    13. Long term (current) use of anticoagulants         Atrial fibrillation  [id number] score is 5  Permanent  Status post maze  Currently on warfarin for stroke prevention with goal INR of 2-3.  Metoprolol for heart rate control.  Currently in sinus rhythm on exam     Coronary artery  disease  Multivessel native CAD  Patent LIMA to LAD and SVG to RCA  Continue with aspirin, statin and beta-blocker  Also on amlodipine and isosorbide dinitrate.  No chest pain/angina.    HFpEF  Recent echocardiogram shows preserved LV function, diastolic dysfunction and mild to moderate mitral valve regurgitation.  She has significant leg edema  I will start her on Lasix and potassium supplementation  Obtain a proBNP, CMP to check electrolytes.  Will discuss addition of Jardiance based on lab work     Hypertension  Currently on amlodipine, isosorbide dinitrate, metoprolol succinate  Blood pressure is well-controlled on current medications  Unable to tolerate lisinopril due to episodes of dizziness  Have recommended compression socks for minimal leg edema.     Hyperlipidemia  Atorvastatin 40 mg  Yearly lipid panel by PCP Dr. Issa     Obesity  BMI 38.7.  She weighs 191 pounds  Diet, exercise, weight loss and lifestyle modification recommended.  Encouraged the use of CPAP     Diabetes  Continue metformin  Her most recent A1c was 6     Hypothyroidism   Currently on Synthroid

## 2024-08-06 ENCOUNTER — OFFICE VISIT (OUTPATIENT)
Dept: CARDIOLOGY | Facility: CLINIC | Age: 74
End: 2024-08-06
Payer: MEDICARE

## 2024-08-06 VITALS
HEART RATE: 63 BPM | DIASTOLIC BLOOD PRESSURE: 68 MMHG | SYSTOLIC BLOOD PRESSURE: 124 MMHG | OXYGEN SATURATION: 96 % | HEIGHT: 59 IN | WEIGHT: 191.8 LBS | BODY MASS INDEX: 38.67 KG/M2

## 2024-08-06 DIAGNOSIS — E11.9 TYPE 2 DIABETES MELLITUS WITHOUT COMPLICATION, WITHOUT LONG-TERM CURRENT USE OF INSULIN: ICD-10-CM

## 2024-08-06 DIAGNOSIS — Z98.890 HISTORY OF CARDIOVASCULAR SURGERY: ICD-10-CM

## 2024-08-06 DIAGNOSIS — I25.10 CORONARY ARTERY DISEASE INVOLVING NATIVE CORONARY ARTERY OF NATIVE HEART WITHOUT ANGINA PECTORIS: ICD-10-CM

## 2024-08-06 DIAGNOSIS — G47.33 OBSTRUCTIVE SLEEP APNEA: ICD-10-CM

## 2024-08-06 DIAGNOSIS — R06.02 SHORTNESS OF BREATH: ICD-10-CM

## 2024-08-06 DIAGNOSIS — I25.110 CORONARY ARTERY DISEASE INVOLVING NATIVE CORONARY ARTERY OF NATIVE HEART WITH UNSTABLE ANGINA PECTORIS: ICD-10-CM

## 2024-08-06 DIAGNOSIS — R60.0 BILATERAL LEG EDEMA: ICD-10-CM

## 2024-08-06 DIAGNOSIS — E78.5 DYSLIPIDEMIA: ICD-10-CM

## 2024-08-06 DIAGNOSIS — I10 PRIMARY HYPERTENSION: ICD-10-CM

## 2024-08-06 DIAGNOSIS — I10 ESSENTIAL HYPERTENSION: ICD-10-CM

## 2024-08-06 DIAGNOSIS — Z79.01 LONG TERM (CURRENT) USE OF ANTICOAGULANTS: ICD-10-CM

## 2024-08-06 DIAGNOSIS — I48.21 PERMANENT ATRIAL FIBRILLATION: Primary | ICD-10-CM

## 2024-08-06 DIAGNOSIS — E78.2 MIXED HYPERLIPIDEMIA: ICD-10-CM

## 2024-08-06 DIAGNOSIS — I10 ESSENTIAL (PRIMARY) HYPERTENSION: ICD-10-CM

## 2024-08-06 PROCEDURE — 1160F RVW MEDS BY RX/DR IN RCRD: CPT | Performed by: INTERNAL MEDICINE

## 2024-08-06 PROCEDURE — 1159F MED LIST DOCD IN RCRD: CPT | Performed by: INTERNAL MEDICINE

## 2024-08-06 PROCEDURE — 99214 OFFICE O/P EST MOD 30 MIN: CPT | Performed by: INTERNAL MEDICINE

## 2024-08-06 PROCEDURE — 3078F DIAST BP <80 MM HG: CPT | Performed by: INTERNAL MEDICINE

## 2024-08-06 PROCEDURE — 3074F SYST BP LT 130 MM HG: CPT | Performed by: INTERNAL MEDICINE

## 2024-08-06 RX ORDER — FUROSEMIDE 40 MG/1
40 TABLET ORAL DAILY
Qty: 90 TABLET | Refills: 3 | Status: SHIPPED | OUTPATIENT
Start: 2024-08-06

## 2024-08-06 RX ORDER — POTASSIUM CHLORIDE 750 MG/1
10 TABLET, FILM COATED, EXTENDED RELEASE ORAL DAILY
Qty: 90 TABLET | Refills: 3 | Status: SHIPPED | OUTPATIENT
Start: 2024-08-06

## 2024-08-28 ENCOUNTER — TELEPHONE (OUTPATIENT)
Dept: CARDIOLOGY | Facility: CLINIC | Age: 74
End: 2024-08-28
Payer: MEDICARE

## 2024-08-28 NOTE — TELEPHONE ENCOUNTER
Pt has not had her labs done as she was to get done on 8/11, she want to get done tomorrow tomorrow but slip has expiration date of 8/11/24, please advise

## 2024-08-29 NOTE — TELEPHONE ENCOUNTER
Per orders, they do not  until . The date of 24 was when she was supposed to get them done. She can have them done at any time.

## 2024-09-04 ENCOUNTER — TELEPHONE (OUTPATIENT)
Dept: CARDIOLOGY | Facility: CLINIC | Age: 74
End: 2024-09-04
Payer: MEDICARE

## 2024-09-30 ENCOUNTER — TELEPHONE (OUTPATIENT)
Dept: CARDIOLOGY | Facility: CLINIC | Age: 74
End: 2024-09-30
Payer: MEDICARE

## 2024-09-30 RX ORDER — ISOSORBIDE DINITRATE 10 MG/1
10 TABLET ORAL 3 TIMES DAILY
Qty: 270 TABLET | Refills: 3 | Status: SHIPPED | OUTPATIENT
Start: 2024-09-30

## 2024-09-30 NOTE — TELEPHONE ENCOUNTER
Incoming Refill Request      Medication requested (name and dose): Isosorbide Dinitrate 10 mg     Pharmacy where request should be sent: [facility]     Additional details provided by patient:     Best call back number: [phone]    Does the patient have less than a 3 day supply:  [x] Yes  [] No

## 2024-10-01 ENCOUNTER — TELEPHONE (OUTPATIENT)
Dept: CARDIOLOGY | Facility: CLINIC | Age: 74
End: 2024-10-01
Payer: MEDICARE

## 2024-10-01 NOTE — TELEPHONE ENCOUNTER
Caller: Mary Deleon    Relationship: Self    Best call back number: [phone]    What was the call regarding: PATIENT MISSED CALL, NO VOICE MAIL, NO NOTES IN CHARTS, POSSIBLE INR CALL, PLEASE ADVISE.

## 2024-10-04 RX ORDER — METOPROLOL SUCCINATE 25 MG/1
25 TABLET, EXTENDED RELEASE ORAL DAILY
Qty: 90 TABLET | Refills: 0 | Status: SHIPPED | OUTPATIENT
Start: 2024-10-04

## 2024-10-14 ENCOUNTER — TELEPHONE (OUTPATIENT)
Dept: CARDIOLOGY | Facility: CLINIC | Age: 74
End: 2024-10-14
Payer: MEDICARE

## 2024-10-14 NOTE — TELEPHONE ENCOUNTER
Pt is unable to have INR taken at PCPs office as in the past. They no longer have a machine. She reports speaking with our office about getting a home machine but has not rec'd. She has not had INR in approx 2 months and would like status of machine.

## 2024-10-18 ENCOUNTER — TELEPHONE (OUTPATIENT)
Dept: CARDIOLOGY | Facility: CLINIC | Age: 74
End: 2024-10-18
Payer: MEDICARE

## 2024-10-18 NOTE — TELEPHONE ENCOUNTER
Addended by: Usha Whalen on: 1/13/2023 09:37 AM     Modules accepted: Orders PT CALLING IN TO CHECK ON THIS- SHE IS CONFUSED BECAUSE SHE IS ON THE PPO PLAN AND WAS JUST HERE ON 8/6/24 AND HER PLAN WAS ACCEPTED- SHE WOULD LIKE A CALL BACK ASAP TO DISCUSS.    [phone]

## 2024-10-18 NOTE — TELEPHONE ENCOUNTER
Called pt to advise [facility] Medicare PPO is non-par. Secondary [employer] will not cover in place of non-par primary so future appts will not be available. She is meeting with her  today and will advise if she is able to obtain different insurance.

## 2024-10-18 NOTE — TELEPHONE ENCOUNTER
Pt advised and voiced understanding. Pt advised to call number on the back of her card for further benefit clarification.

## 2024-10-18 NOTE — TELEPHONE ENCOUNTER
Confirmed with Philipp that the order is under review awaiting insurance process. Called [facility]OM advising pt to contact our office if she has not heard anything next week Will

## 2024-10-18 NOTE — TELEPHONE ENCOUNTER
Shilpi from [facility] calling to advise that pts [facility] PPO is an open network and she can see any provider who will bill. Also stated that her [employer] secondary is an open network for the next 3 years and will cover as a secondary.

## 2024-10-18 NOTE — TELEPHONE ENCOUNTER
Left vm for patient to return call to inform we can keep appointments. However, her secondary insurance will not cover remaining out of pocket expenses because her primary is out of network.

## 2024-10-22 ENCOUNTER — TELEPHONE (OUTPATIENT)
Dept: CARDIOLOGY | Facility: CLINIC | Age: 74
End: 2024-10-22
Payer: MEDICARE

## 2024-10-22 NOTE — TELEPHONE ENCOUNTER
Pt wants to have INR checked in office versus using home monitor. Pt scheduled for INR in office on Friday per pt request. apLPN

## 2024-10-25 ENCOUNTER — ANTICOAGULATION VISIT (OUTPATIENT)
Dept: CARDIOLOGY | Facility: CLINIC | Age: 74
End: 2024-10-25
Payer: MEDICARE

## 2024-10-25 VITALS — SYSTOLIC BLOOD PRESSURE: 156 MMHG | DIASTOLIC BLOOD PRESSURE: 63 MMHG | HEART RATE: 61 BPM

## 2024-10-25 DIAGNOSIS — Z79.01 LONG TERM (CURRENT) USE OF ANTICOAGULANTS: ICD-10-CM

## 2024-10-25 DIAGNOSIS — I48.21 PERMANENT ATRIAL FIBRILLATION: Primary | ICD-10-CM

## 2024-10-25 LAB — INR PPP: 2.8 (ref 2–3)

## 2024-10-25 PROCEDURE — 36416 COLLJ CAPILLARY BLOOD SPEC: CPT | Performed by: INTERNAL MEDICINE

## 2024-10-25 PROCEDURE — 85610 PROTHROMBIN TIME: CPT | Performed by: INTERNAL MEDICINE

## 2024-11-05 ENCOUNTER — TELEPHONE (OUTPATIENT)
Dept: CARDIOLOGY | Facility: CLINIC | Age: 74
End: 2024-11-05
Payer: MEDICARE

## 2024-11-05 NOTE — TELEPHONE ENCOUNTER
Called patient to remind her about getting labs done as they are now 3 months overdue.     Patient states that she has not gone to get her labs done yet because the Lasix & Potassium medication cause her sever dizziness. She reports that she had tried taking just a half of a pill, which did help a little bit, but she still has some dizziness. Patient reports that she has not been able to take the medication recently because she is not able to function when she takes it. I advised the patient that she still needs to get her labs done so that we can check her electrolytes and kidney function. Patient voiced her full understanding and said that she will try to get those done this week. However, patient is worried about her swelling because she is not able to take that medication.

## 2024-11-05 NOTE — TELEPHONE ENCOUNTER
Amlodipine could also be contributing to her leg swelling. She should stop taking amlodipine and monitor her blood pressure daily for 1 week, then report back to us.

## 2024-11-06 NOTE — TELEPHONE ENCOUNTER
Spoke with patient and gave her this information. Patient voiced her full understanding and she repeated these instructions back to me.

## 2024-11-06 NOTE — TELEPHONE ENCOUNTER
I have attempted to reach the patient 2 more times today and both times I got her voicemail. I have left her another message requesting that she ask to speak with me directly when she calls back.

## 2024-11-07 NOTE — TELEPHONE ENCOUNTER
Isela... [facility] [place] They are faxing results to us.

## 2024-11-13 ENCOUNTER — TELEPHONE (OUTPATIENT)
Dept: CARDIOLOGY | Facility: CLINIC | Age: 74
End: 2024-11-13
Payer: MEDICARE

## 2024-11-13 NOTE — TELEPHONE ENCOUNTER
Per Ladan, on 11/5/24, patient should stop taking amlodipine and monitor BP daily for 1 week. Please see blood pressure readings and advise how to proceed.

## 2024-11-13 NOTE — TELEPHONE ENCOUNTER
Patient reporting blood pressure/heart rate.   Patient wants to know if she needs to restart amlodipine?     11/6 142/72 56  11/7 135/74 58  11/8 150/81 55  11/9 142/81 59  11/10 145/68 54  11/11 137/76 56  11/12 149/79 61

## 2024-11-18 ENCOUNTER — TELEPHONE (OUTPATIENT)
Dept: CARDIOLOGY | Facility: CLINIC | Age: 74
End: 2024-11-18
Payer: MEDICARE

## 2024-11-18 NOTE — TELEPHONE ENCOUNTER
Patient called in to report blood pressure  11/13 144/82 59  11/14- 152/79 60  Did not take Friday or Saturday. She felt bad.   11/17 162/85 60  154/80 55

## 2024-11-18 NOTE — TELEPHONE ENCOUNTER
Gina Corona, Chacha Rep5 minutes ago (3:59 PM)       Patient called in to report blood pressure  11/13 144/82 59  11/14- 152/79 60  Did not take Friday or Saturday. She felt bad.   11/17 162/85 60  154/80 55

## 2024-11-22 PROBLEM — R10.9 ABDOMINAL PAIN: Status: RESOLVED | Noted: 2020-02-04 | Resolved: 2024-11-22

## 2024-11-22 PROBLEM — E11.9 TYPE 2 DIABETES MELLITUS: Chronic | Status: ACTIVE | Noted: 2018-04-11

## 2024-11-22 PROBLEM — I10 ESSENTIAL HYPERTENSION: Chronic | Status: ACTIVE | Noted: 2020-02-04

## 2024-11-22 PROBLEM — R07.9 CHEST PAIN: Status: RESOLVED | Noted: 2020-02-04 | Resolved: 2024-11-22

## 2024-11-22 PROBLEM — R94.39 ABNORMAL NUCLEAR STRESS TEST: Status: RESOLVED | Noted: 2020-12-11 | Resolved: 2024-11-22

## 2024-11-22 PROBLEM — I25.110 CORONARY ARTERY DISEASE INVOLVING NATIVE CORONARY ARTERY OF NATIVE HEART WITH UNSTABLE ANGINA PECTORIS: Status: RESOLVED | Noted: 2020-12-11 | Resolved: 2024-11-22

## 2024-11-22 PROBLEM — R06.09 DYSPNEA ON EXERTION: Status: RESOLVED | Noted: 2017-05-18 | Resolved: 2024-11-22

## 2024-11-22 PROBLEM — E03.9 HYPOTHYROIDISM: Chronic | Status: ACTIVE | Noted: 2020-07-10

## 2024-11-22 PROBLEM — E78.2 MIXED HYPERLIPIDEMIA: Chronic | Status: ACTIVE | Noted: 2020-02-04

## 2024-11-22 PROBLEM — R00.2 PALPITATIONS: Status: RESOLVED | Noted: 2021-01-29 | Resolved: 2024-11-22

## 2024-11-22 PROBLEM — I34.0 NONRHEUMATIC MITRAL VALVE REGURGITATION: Status: ACTIVE | Noted: 2019-01-02

## 2024-11-22 PROBLEM — I25.118 CORONARY ARTERY DISEASE OF NATIVE ARTERY OF NATIVE HEART WITH STABLE ANGINA PECTORIS: Chronic | Status: ACTIVE | Noted: 2017-05-25

## 2024-11-22 PROBLEM — I50.32 CHRONIC HEART FAILURE WITH PRESERVED EJECTION FRACTION (HFPEF): Chronic | Status: ACTIVE | Noted: 2024-08-06

## 2024-11-22 PROBLEM — I48.92 ATRIAL FLUTTER: Status: RESOLVED | Noted: 2018-10-18 | Resolved: 2024-11-22

## 2024-11-22 PROBLEM — R11.2 NAUSEA AND VOMITING: Status: RESOLVED | Noted: 2017-06-27 | Resolved: 2024-11-22

## 2024-11-22 PROBLEM — I20.0 UNSTABLE ANGINA PECTORIS: Status: RESOLVED | Noted: 2017-05-18 | Resolved: 2024-11-22

## 2024-11-22 NOTE — H&P (VIEW-ONLY)
Subjective:     Encounter Date:11/25/2024        Patient ID: Mary Deleon [id number]     Chief Complaint:  weakness, lightheadedness, elevated blood pressure, leg swelling    History of Present Illness:  Mary Deleon is a 73 y.o. female with a history of coronary artery disease status post CABG in 2017 with MAZE procedure, hypertension, hyperlipidemia, atrial flutter/fibrillation status post ablation x 2 by Dr. Rios, mitral regurgitation and hypothyroidism who presents to the office complaining of weakness, lightheadedness, elevated blood pressure and leg swelling. The patient was previously taken off amlodipine due to leg swelling. She reports some improvement in her leg swelling, but states her blood pressure is now elevated. She also has a history of intolerance to lisinopril and Lasix due to episodes of severe dizziness. She had labs done by [facility] on 11/7/24, which showed elevated proBNP of 391, BUN of 13, creatinine of 0.73, and sodium of 140. She denies any chest pain or shortness of breath. She is anxious about elevated blood pressures. She has her home blood pressure log, which I reviewed. Her blood pressures have mostly been in the 140s/80s, but she had a couple of 150s-160s/90s.       Review of systems:  Review of Systems   Constitutional: Positive for malaise/fatigue.   Cardiovascular:  Positive for leg swelling. Negative for chest pain, dyspnea on exertion and palpitations.   Respiratory:  Negative for cough and shortness of breath.    Gastrointestinal:  Negative for abdominal pain, nausea and vomiting.   Neurological:  Positive for dizziness, headaches and light-headedness. Negative for focal weakness and numbness.   All other systems reviewed and are negative.        The following portions of the patient's history were reviewed and updated as appropriate: allergies, current medications, past family history, past medical history, past social history, past surgical history and  "problem list.    Past Medical History:  Past Medical History:   Diagnosis Date    Abnormal nuclear stress test 12/11/2020    Added automatically from request for surgery [id number]      Arthritis     Atrial fibrillation     Atrial flutter 10/18/2018    Chronic heart failure with preserved ejection fraction (HFpEF) 08/06/2024    COPD (chronic obstructive pulmonary disease)     Coronary artery disease     Depression     Diverticulitis     Hyperlipidemia     Hypertension     Mitral valve prolapse     Nonrheumatic mitral valve regurgitation 01/02/2019       Past Surgical History:  Past Surgical History:   Procedure Laterality Date    CARDIAC CATHETERIZATION N/A 01/11/2021    Procedure: Coronary angiography;  Surgeon: Al Garcia MD;  Location: [facility] CATH INVASIVE LOCATION;  Service: Cardiovascular;  Laterality: N/A;    CARDIAC CATHETERIZATION N/A 01/11/2021    Procedure: Saphenous Vein Graft;  Surgeon: Al Garcia MD;  Location: [facility] CATH INVASIVE LOCATION;  Service: Cardiovascular;  Laterality: N/A;    CATARACT EXTRACTION, BILATERAL Bilateral     HYSTERECTOMY      KNEE SURGERY          Allergies:  Allergies   Allergen Reactions    Amlodipine Swelling     Leg swelling    Lasix [Furosemide] Dizziness    Lisinopril Dizziness    Omeprazole Hives    Sulfa Antibiotics Hives    Nitrofurantoin Other (See Comments)     .    Vancomycin Other (See Comments)     \"red man syndrome\" - turned red from head to toe    Codeine GI Intolerance    Naproxen Rash    Naproxen Sodium Rash    Tylenol With Codeine #3 [Acetaminophen-Codeine] GI Intolerance       Current Meds:     Current Outpatient Medications:     albuterol sulfate  (90 Base) MCG/ACT inhaler, Inhale 2 puffs Every 4 (Four) Hours As Needed., Disp: , Rfl:     aspirin 81 MG EC tablet, Take 1 tablet by mouth Daily., Disp: , Rfl:     atorvastatin (LIPITOR) 40 MG tablet, TAKE 1 TABLET BY MOUTH ONCE DAILY AT BEDTIME, Disp: 90 tablet, Rfl: 3    BREO " ELLIPTA 100-25 MCG/INH inhaler, Inhale 1 puff Daily., Disp: , Rfl: 5    Calcium Carb-Cholecalciferol (Calcium 500 + D) 500-3.125 MG-MCG tablet, Take  by mouth Daily., Disp: , Rfl:     Docusate Sodium (COLACE PO), Take 2 capsules by mouth 2 (two) times a day., Disp: , Rfl:     isosorbide dinitrate (ISORDIL) 10 MG tablet, Take 1 tablet by mouth 3 (Three) Times a Day., Disp: 270 tablet, Rfl: 3    levothyroxine (SYNTHROID, LEVOTHROID) 25 MCG tablet, Take 1 tablet by mouth Daily., Disp: , Rfl:     metFORMIN (GLUCOPHAGE) 500 MG tablet, Take 1 tablet by mouth 2 (Two) Times a Day., Disp: , Rfl: 1    metoprolol succinate XL (TOPROL-XL) 25 MG 24 hr tablet, Take 1 tablet by mouth once daily, Disp: 90 tablet, Rfl: 0    montelukast (SINGULAIR) 10 MG tablet, Take 1 tablet by mouth Daily., Disp: , Rfl:     ONETOUCH DELICA LANCETS FINE misc, USE TO CHECK GLUCOSE TWICE DAILY, Disp: , Rfl: 5    ONETOUCH VERIO test strip, USE TO CHECK GLUCOSE TWICE DAILY, Disp: , Rfl: 3    Probiotic Product (PROBIOTIC-10 PO), Take  by mouth., Disp: , Rfl:     SPIRIVA RESPIMAT 1.25 MCG/ACT aerosol solution inhaler, Inhale 2 puffs Daily., Disp: , Rfl: 1    vitamin D3 125 MCG (5000 UT) capsule capsule, Take 1 capsule by mouth Daily., Disp: , Rfl:     warfarin (COUMADIN) 4 MG tablet, Take one tablet by mouth daily except Tuesday or as directed, Disp: 90 tablet, Rfl: 0    Social History:   Social History     Tobacco Use    Smoking status: Never     Passive exposure: Yes    Smokeless tobacco: Never   Substance Use Topics    Alcohol use: No        Family History:  Family History   Problem Relation Age of Onset    Heart disease Mother     Pneumonia Mother     Asthma Father     Alzheimer's disease Father     Heart disease Father                  Objective:       Physical Exam:  Vitals reviewed.   Constitutional:       Appearance: Well-developed and well-groomed. Obese.   Eyes:      Conjunctiva/sclera: Conjunctivae normal.      Pupils: Pupils are equal, round,  "and reactive to light.   HENT:      Head: Normocephalic and atraumatic.    Mouth/Throat:      Mouth: Mucous membranes are moist.      Pharynx: Oropharynx is clear.   Pulmonary:      Effort: Pulmonary effort is normal.      Breath sounds: Normal breath sounds.   Cardiovascular:      PMI at left midclavicular line. Normal rate. Regular rhythm.      Murmurs: There is a systolic murmur.   Pulses:     Intact distal pulses.   Edema:     Pretibial: bilateral 1+ edema of the pretibial area.     Ankle: bilateral 1+ edema of the ankle.  Abdominal:      General: Bowel sounds are normal.      Palpations: Abdomen is soft.   Musculoskeletal: Normal range of motion.      Cervical back: Normal range of motion. Skin:     General: Skin is warm and dry.   Neurological:      Mental Status: Alert and oriented to person, place, and time.   Psychiatric:         Mood and Affect: Mood is anxious.         Behavior: Behavior normal.         Vital signs:  /53 (BP Location: Left arm, Patient Position: Sitting, Cuff Size: Large Adult)   Pulse 60   Ht 149.9 cm (59\")   Wt 87.5 kg (193 lb)   LMP  (LMP Unknown)   SpO2 96%   BMI 38.98 kg/m²       Recent labs reviewed:    CBC        BMP        CMP       Creatinine Clearance  CrCl cannot be calculated (Patient's most recent lab result is older than the maximum 30 days allowed.).    BNP        TROPONIN        CoAg          Cardiology results reviewed:    Echocardiogram  Results for orders placed during the hospital encounter of 07/29/24    Adult Transthoracic Echo Complete w/ Color, Spectral and Contrast if necessary per protocol    Interpretation Summary    Left ventricular systolic function is normal. Calculated left ventricular EF = 65% Left ventricular ejection fraction appears to be 61 - 65%.    Left ventricular diastolic dysfunction is noted.    The left atrial cavity is dilated.    Moderate mitral valve regurgitation is present.    Estimated right ventricular systolic pressure from " tricuspid regurgitation is mildly elevated (35-45 mmHg).      Cardiac catheterization  Results for orders placed during the hospital encounter of 01/11/21    Cardiac Catheterization/Vascular Study    Narrative  CARDIAC CATHETERIZATION REPORT    DATE OF PROCEDURE: 1/11/2021      INDICATION FOR PROCEDURE: Abnormal stress test symptoms of unstable angina    PROCEDURE PERFORMED: Selective right and left coronary angiography  Aortocoronary bypass vein graft angiography  Left internal mammary artery graft angiography    PROCEDURE COMMENTS:    All the risks and benefits explained with the patient informed consent was obtained from the patient.  Patient was brought to the cardiac catheterization laboratory placed on the table draped and prepped in the usual sterile fashion.  2% lidocaine was used to anesthetize the right groin area.  Using the modified Seldinger technique 6 Setswana sheath was inserted into the right femoral artery.    6 Setswana JL4 catheter was used to perform the selective left coronary angiography    6 Setswana JR4 catheter was used to perform the selective right coronary angiography  Aortocoronary bypass vein graft angiography  Left intramammary artery graft angiography        Patient tolerated procedure well without any immediate complications      FINDINGS:    1. HEMODYNAMICS:  , /70 mmHg.    2. LEFT VENTRICULOGRAPHY: Not done recently had echocardiogram    3. CORONARY ANGIOGRAPHY:  Dominance codominant  Left Main: Large-caliber vessel bifurcates into LAD circumflex artery 0% stenosis  LAD: Large-caliber vessel has proximal 70 to 80% disease gives rise to couple of diagonal arteries and that did not quite reach the apex at the level of the diagonal artery  Both the LAD and diagonal artery have a 70 to 80% stenosis  The LIMA to LAD was open supplying a very distal LAD which is a small caliber vessel and it was not supplying any diagonal arteries  CIRC: Large caliber codominant artery gives rise to  marginal and large caliber lateral branch  The ostial marginal branch has 70% stenosis the vein graft marginal branch was occluded  Mid to distal marginal branch is severely diseased  After the marginal branch circumflex artery 50% stenosis lateral branches have no significant disease  RCA: Proximal RCA up to 70% stenosis there is a vein graft that was open supplying the PDA which is a large-caliber vessel    SUMMARY: 2 out of 3 grafts were open  The vein graft to the marginal branch was occluded  LIMA to LAD was open but flow to the diagonal arteries were compromised  Films reviewed with interventional cardiology Dr. Griffin  RECOMMENDATIONS: We will try aggressive medical treatment  If continues to have symptoms patient may need high risk intervention to the LAD which will require long length stents plus or minus stent to the marginal branch which is also complicated intervention  Discussed with the patient  I called and left message with the patient's daughter               Assessment:         Diagnoses and all orders for this visit:    1. Acute on chronic heart failure with preserved ejection fraction (HFpEF) (Primary)  -     Case Request Cath Lab: Right and Left Heart Cath with Coronar Angiography  -     CBC (No Diff); Future  -     Basic Metabolic Panel; Future  -     Protime-INR; Future  -     Lipid Panel; Future  -     Adult Transesophageal Echo (HERBERT) W/ Cont if Necessary Per Protocol; Future    2. Bilateral leg edema    3. Lightheadedness    4. Nonrheumatic mitral valve regurgitation  -     Case Request Cath Lab: Right and Left Heart Cath with Coronar Angiography  -     CBC (No Diff); Future  -     Basic Metabolic Panel; Future  -     Protime-INR; Future  -     Lipid Panel; Future  -     Adult Transesophageal Echo (HERBERT) W/ Cont if Necessary Per Protocol; Future    5. Coronary artery disease of native artery of native heart with stable angina pectoris  -     Case Request Cath Lab: Right and Left Heart Cath  with Coronar Angiography  -     CBC (No Diff); Future  -     Basic Metabolic Panel; Future  -     Protime-INR; Future  -     Lipid Panel; Future  -     Adult Transesophageal Echo (HERBERT) W/ Cont if Necessary Per Protocol; Future    6. Mixed hyperlipidemia    7. Permanent atrial fibrillation    8. Essential hypertension    9. Type 2 diabetes mellitus without complication, without long-term current use of insulin    10. Hypothyroidism, unspecified type    11. Obesity (BMI 30-39.9)                Plan:       Acute on Chronic HFpEF  Mitral regurgitation  Patient with BLE edema.  She states edema improved some after stopping amlodipine, but continues to have pretibial and ankle swelling.   She reports a recent 10 lb weight gain.  Recent proBNP elevated  Echocardiogram done July 2024 shows preserved LV function, diastolic dysfunction and moderate mitral valve regurgitation.  She was previously started on Lasix and potassium supplementation, but stopped taking them due to intolerance with severe dizziness.  We discussed risks versus benefits of right and left cardiac catheterization given her symptoms.  She is agreeable to proceed.  Consider starting spironolactone, pending right heart cath findings.  Daily weight recommended  2 g sodium diet recommended  I will also order a HERBERT to further assess her mitral valve.   She will stop warfarin 5 days before her procedures.     Lightheadedness  Further workup as above    Coronary artery disease of native artery of native heart with stable angina pectoris  History of CABG  Continue aspirin, high intensity statin, isosorbide dinitrate and beta blocker    Mixed hyperlipidemia  Continue atorvastatin   Goal LDL <70    Essential hypertension, chronic  Patient is concerned about elevated blood pressures at home.  She has been intolerant to multiple medications, including lisinopril, Lasix, and amlodipine.   Continue nitrate and beta blocker   Consider switching Toprol XL to  carvedilol  Consider starting nifedipine or hydralazine    Permanent atrial fibrillation  Followed by EP, Dr. Rios  Continue beta blocker for rate control  [id number] score 6  Continue warfarin     Type 2 diabetes mellitus without complication, without long-term current use of insulin  On metformin     Hypothyroidism, unspecified type  On levothyroxine     Obesity   BMI is 38.98  Lifestyle modifications recommended        Electronically signed by SILVANO Mittal, 11/25/24, 1:46 PM EST.

## 2024-11-22 NOTE — PROGRESS NOTES
Subjective:     Encounter Date:11/25/2024        Patient ID: Mary Deleon [id number]     Chief Complaint:  weakness, lightheadedness, elevated blood pressure, leg swelling    History of Present Illness:  Mary Deleon is a 73 y.o. female with a history of coronary artery disease status post CABG in 2017 with MAZE procedure, hypertension, hyperlipidemia, atrial flutter/fibrillation status post ablation x 2 by Dr. Rios, mitral regurgitation and hypothyroidism who presents to the office complaining of weakness, lightheadedness, elevated blood pressure and leg swelling. The patient was previously taken off amlodipine due to leg swelling. She reports some improvement in her leg swelling, but states her blood pressure is now elevated. She also has a history of intolerance to lisinopril and Lasix due to episodes of severe dizziness. She had labs done by [facility] on 11/7/24, which showed elevated proBNP of 391, BUN of 13, creatinine of 0.73, and sodium of 140. She denies any chest pain or shortness of breath. She is anxious about elevated blood pressures. She has her home blood pressure log, which I reviewed. Her blood pressures have mostly been in the 140s/80s, but she had a couple of 150s-160s/90s.       Review of systems:  Review of Systems   Constitutional: Positive for malaise/fatigue.   Cardiovascular:  Positive for leg swelling. Negative for chest pain, dyspnea on exertion and palpitations.   Respiratory:  Negative for cough and shortness of breath.    Gastrointestinal:  Negative for abdominal pain, nausea and vomiting.   Neurological:  Positive for dizziness, headaches and light-headedness. Negative for focal weakness and numbness.   All other systems reviewed and are negative.        The following portions of the patient's history were reviewed and updated as appropriate: allergies, current medications, past family history, past medical history, past social history, past surgical history and  "problem list.    Past Medical History:  Past Medical History:   Diagnosis Date    Abnormal nuclear stress test 12/11/2020    Added automatically from request for surgery [id number]      Arthritis     Atrial fibrillation     Atrial flutter 10/18/2018    Chronic heart failure with preserved ejection fraction (HFpEF) 08/06/2024    COPD (chronic obstructive pulmonary disease)     Coronary artery disease     Depression     Diverticulitis     Hyperlipidemia     Hypertension     Mitral valve prolapse     Nonrheumatic mitral valve regurgitation 01/02/2019       Past Surgical History:  Past Surgical History:   Procedure Laterality Date    CARDIAC CATHETERIZATION N/A 01/11/2021    Procedure: Coronary angiography;  Surgeon: Al Garcia MD;  Location: [facility] CATH INVASIVE LOCATION;  Service: Cardiovascular;  Laterality: N/A;    CARDIAC CATHETERIZATION N/A 01/11/2021    Procedure: Saphenous Vein Graft;  Surgeon: Al Garcia MD;  Location: [facility] CATH INVASIVE LOCATION;  Service: Cardiovascular;  Laterality: N/A;    CATARACT EXTRACTION, BILATERAL Bilateral     HYSTERECTOMY      KNEE SURGERY          Allergies:  Allergies   Allergen Reactions    Amlodipine Swelling     Leg swelling    Lasix [Furosemide] Dizziness    Lisinopril Dizziness    Omeprazole Hives    Sulfa Antibiotics Hives    Nitrofurantoin Other (See Comments)     .    Vancomycin Other (See Comments)     \"red man syndrome\" - turned red from head to toe    Codeine GI Intolerance    Naproxen Rash    Naproxen Sodium Rash    Tylenol With Codeine #3 [Acetaminophen-Codeine] GI Intolerance       Current Meds:     Current Outpatient Medications:     albuterol sulfate  (90 Base) MCG/ACT inhaler, Inhale 2 puffs Every 4 (Four) Hours As Needed., Disp: , Rfl:     aspirin 81 MG EC tablet, Take 1 tablet by mouth Daily., Disp: , Rfl:     atorvastatin (LIPITOR) 40 MG tablet, TAKE 1 TABLET BY MOUTH ONCE DAILY AT BEDTIME, Disp: 90 tablet, Rfl: 3    BREO " ELLIPTA 100-25 MCG/INH inhaler, Inhale 1 puff Daily., Disp: , Rfl: 5    Calcium Carb-Cholecalciferol (Calcium 500 + D) 500-3.125 MG-MCG tablet, Take  by mouth Daily., Disp: , Rfl:     Docusate Sodium (COLACE PO), Take 2 capsules by mouth 2 (two) times a day., Disp: , Rfl:     isosorbide dinitrate (ISORDIL) 10 MG tablet, Take 1 tablet by mouth 3 (Three) Times a Day., Disp: 270 tablet, Rfl: 3    levothyroxine (SYNTHROID, LEVOTHROID) 25 MCG tablet, Take 1 tablet by mouth Daily., Disp: , Rfl:     metFORMIN (GLUCOPHAGE) 500 MG tablet, Take 1 tablet by mouth 2 (Two) Times a Day., Disp: , Rfl: 1    metoprolol succinate XL (TOPROL-XL) 25 MG 24 hr tablet, Take 1 tablet by mouth once daily, Disp: 90 tablet, Rfl: 0    montelukast (SINGULAIR) 10 MG tablet, Take 1 tablet by mouth Daily., Disp: , Rfl:     ONETOUCH DELICA LANCETS FINE misc, USE TO CHECK GLUCOSE TWICE DAILY, Disp: , Rfl: 5    ONETOUCH VERIO test strip, USE TO CHECK GLUCOSE TWICE DAILY, Disp: , Rfl: 3    Probiotic Product (PROBIOTIC-10 PO), Take  by mouth., Disp: , Rfl:     SPIRIVA RESPIMAT 1.25 MCG/ACT aerosol solution inhaler, Inhale 2 puffs Daily., Disp: , Rfl: 1    vitamin D3 125 MCG (5000 UT) capsule capsule, Take 1 capsule by mouth Daily., Disp: , Rfl:     warfarin (COUMADIN) 4 MG tablet, Take one tablet by mouth daily except Tuesday or as directed, Disp: 90 tablet, Rfl: 0    Social History:   Social History     Tobacco Use    Smoking status: Never     Passive exposure: Yes    Smokeless tobacco: Never   Substance Use Topics    Alcohol use: No        Family History:  Family History   Problem Relation Age of Onset    Heart disease Mother     Pneumonia Mother     Asthma Father     Alzheimer's disease Father     Heart disease Father                  Objective:       Physical Exam:  Vitals reviewed.   Constitutional:       Appearance: Well-developed and well-groomed. Obese.   Eyes:      Conjunctiva/sclera: Conjunctivae normal.      Pupils: Pupils are equal, round,  "and reactive to light.   HENT:      Head: Normocephalic and atraumatic.    Mouth/Throat:      Mouth: Mucous membranes are moist.      Pharynx: Oropharynx is clear.   Pulmonary:      Effort: Pulmonary effort is normal.      Breath sounds: Normal breath sounds.   Cardiovascular:      PMI at left midclavicular line. Normal rate. Regular rhythm.      Murmurs: There is a systolic murmur.   Pulses:     Intact distal pulses.   Edema:     Pretibial: bilateral 1+ edema of the pretibial area.     Ankle: bilateral 1+ edema of the ankle.  Abdominal:      General: Bowel sounds are normal.      Palpations: Abdomen is soft.   Musculoskeletal: Normal range of motion.      Cervical back: Normal range of motion. Skin:     General: Skin is warm and dry.   Neurological:      Mental Status: Alert and oriented to person, place, and time.   Psychiatric:         Mood and Affect: Mood is anxious.         Behavior: Behavior normal.         Vital signs:  /53 (BP Location: Left arm, Patient Position: Sitting, Cuff Size: Large Adult)   Pulse 60   Ht 149.9 cm (59\")   Wt 87.5 kg (193 lb)   LMP  (LMP Unknown)   SpO2 96%   BMI 38.98 kg/m²       Recent labs reviewed:    CBC        BMP        CMP       Creatinine Clearance  CrCl cannot be calculated (Patient's most recent lab result is older than the maximum 30 days allowed.).    BNP        TROPONIN        CoAg          Cardiology results reviewed:    Echocardiogram  Results for orders placed during the hospital encounter of 07/29/24    Adult Transthoracic Echo Complete w/ Color, Spectral and Contrast if necessary per protocol    Interpretation Summary    Left ventricular systolic function is normal. Calculated left ventricular EF = 65% Left ventricular ejection fraction appears to be 61 - 65%.    Left ventricular diastolic dysfunction is noted.    The left atrial cavity is dilated.    Moderate mitral valve regurgitation is present.    Estimated right ventricular systolic pressure from " tricuspid regurgitation is mildly elevated (35-45 mmHg).      Cardiac catheterization  Results for orders placed during the hospital encounter of 01/11/21    Cardiac Catheterization/Vascular Study    Narrative  CARDIAC CATHETERIZATION REPORT    DATE OF PROCEDURE: 1/11/2021      INDICATION FOR PROCEDURE: Abnormal stress test symptoms of unstable angina    PROCEDURE PERFORMED: Selective right and left coronary angiography  Aortocoronary bypass vein graft angiography  Left internal mammary artery graft angiography    PROCEDURE COMMENTS:    All the risks and benefits explained with the patient informed consent was obtained from the patient.  Patient was brought to the cardiac catheterization laboratory placed on the table draped and prepped in the usual sterile fashion.  2% lidocaine was used to anesthetize the right groin area.  Using the modified Seldinger technique 6 Indonesian sheath was inserted into the right femoral artery.    6 Indonesian JL4 catheter was used to perform the selective left coronary angiography    6 Indonesian JR4 catheter was used to perform the selective right coronary angiography  Aortocoronary bypass vein graft angiography  Left intramammary artery graft angiography        Patient tolerated procedure well without any immediate complications      FINDINGS:    1. HEMODYNAMICS:  , /70 mmHg.    2. LEFT VENTRICULOGRAPHY: Not done recently had echocardiogram    3. CORONARY ANGIOGRAPHY:  Dominance codominant  Left Main: Large-caliber vessel bifurcates into LAD circumflex artery 0% stenosis  LAD: Large-caliber vessel has proximal 70 to 80% disease gives rise to couple of diagonal arteries and that did not quite reach the apex at the level of the diagonal artery  Both the LAD and diagonal artery have a 70 to 80% stenosis  The LIMA to LAD was open supplying a very distal LAD which is a small caliber vessel and it was not supplying any diagonal arteries  CIRC: Large caliber codominant artery gives rise to  marginal and large caliber lateral branch  The ostial marginal branch has 70% stenosis the vein graft marginal branch was occluded  Mid to distal marginal branch is severely diseased  After the marginal branch circumflex artery 50% stenosis lateral branches have no significant disease  RCA: Proximal RCA up to 70% stenosis there is a vein graft that was open supplying the PDA which is a large-caliber vessel    SUMMARY: 2 out of 3 grafts were open  The vein graft to the marginal branch was occluded  LIMA to LAD was open but flow to the diagonal arteries were compromised  Films reviewed with interventional cardiology Dr. Griffin  RECOMMENDATIONS: We will try aggressive medical treatment  If continues to have symptoms patient may need high risk intervention to the LAD which will require long length stents plus or minus stent to the marginal branch which is also complicated intervention  Discussed with the patient  I called and left message with the patient's daughter               Assessment:         Diagnoses and all orders for this visit:    1. Acute on chronic heart failure with preserved ejection fraction (HFpEF) (Primary)  -     Case Request Cath Lab: Right and Left Heart Cath with Coronar Angiography  -     CBC (No Diff); Future  -     Basic Metabolic Panel; Future  -     Protime-INR; Future  -     Lipid Panel; Future  -     Adult Transesophageal Echo (HERBERT) W/ Cont if Necessary Per Protocol; Future    2. Bilateral leg edema    3. Lightheadedness    4. Nonrheumatic mitral valve regurgitation  -     Case Request Cath Lab: Right and Left Heart Cath with Coronar Angiography  -     CBC (No Diff); Future  -     Basic Metabolic Panel; Future  -     Protime-INR; Future  -     Lipid Panel; Future  -     Adult Transesophageal Echo (HERBERT) W/ Cont if Necessary Per Protocol; Future    5. Coronary artery disease of native artery of native heart with stable angina pectoris  -     Case Request Cath Lab: Right and Left Heart Cath  with Coronar Angiography  -     CBC (No Diff); Future  -     Basic Metabolic Panel; Future  -     Protime-INR; Future  -     Lipid Panel; Future  -     Adult Transesophageal Echo (HERBERT) W/ Cont if Necessary Per Protocol; Future    6. Mixed hyperlipidemia    7. Permanent atrial fibrillation    8. Essential hypertension    9. Type 2 diabetes mellitus without complication, without long-term current use of insulin    10. Hypothyroidism, unspecified type    11. Obesity (BMI 30-39.9)                Plan:       Acute on Chronic HFpEF  Mitral regurgitation  Patient with BLE edema.  She states edema improved some after stopping amlodipine, but continues to have pretibial and ankle swelling.   She reports a recent 10 lb weight gain.  Recent proBNP elevated  Echocardiogram done July 2024 shows preserved LV function, diastolic dysfunction and moderate mitral valve regurgitation.  She was previously started on Lasix and potassium supplementation, but stopped taking them due to intolerance with severe dizziness.  We discussed risks versus benefits of right and left cardiac catheterization given her symptoms.  She is agreeable to proceed.  Consider starting spironolactone, pending right heart cath findings.  Daily weight recommended  2 g sodium diet recommended  I will also order a HERBERT to further assess her mitral valve.   She will stop warfarin 5 days before her procedures.     Lightheadedness  Further workup as above    Coronary artery disease of native artery of native heart with stable angina pectoris  History of CABG  Continue aspirin, high intensity statin, isosorbide dinitrate and beta blocker    Mixed hyperlipidemia  Continue atorvastatin   Goal LDL <70    Essential hypertension, chronic  Patient is concerned about elevated blood pressures at home.  She has been intolerant to multiple medications, including lisinopril, Lasix, and amlodipine.   Continue nitrate and beta blocker   Consider switching Toprol XL to  carvedilol  Consider starting nifedipine or hydralazine    Permanent atrial fibrillation  Followed by EP, Dr. Rios  Continue beta blocker for rate control  [id number] score 6  Continue warfarin     Type 2 diabetes mellitus without complication, without long-term current use of insulin  On metformin     Hypothyroidism, unspecified type  On levothyroxine     Obesity   BMI is 38.98  Lifestyle modifications recommended        Electronically signed by SILVANO Mittal, 11/25/24, 1:46 PM EST.

## 2024-11-25 ENCOUNTER — OFFICE VISIT (OUTPATIENT)
Dept: CARDIOLOGY | Facility: CLINIC | Age: 74
End: 2024-11-25
Payer: MEDICARE

## 2024-11-25 VITALS
HEIGHT: 59 IN | OXYGEN SATURATION: 96 % | SYSTOLIC BLOOD PRESSURE: 147 MMHG | BODY MASS INDEX: 38.91 KG/M2 | DIASTOLIC BLOOD PRESSURE: 53 MMHG | WEIGHT: 193 LBS | HEART RATE: 60 BPM

## 2024-11-25 DIAGNOSIS — R60.0 BILATERAL LEG EDEMA: ICD-10-CM

## 2024-11-25 DIAGNOSIS — E11.9 TYPE 2 DIABETES MELLITUS WITHOUT COMPLICATION, WITHOUT LONG-TERM CURRENT USE OF INSULIN: Chronic | ICD-10-CM

## 2024-11-25 DIAGNOSIS — E03.9 HYPOTHYROIDISM, UNSPECIFIED TYPE: Chronic | ICD-10-CM

## 2024-11-25 DIAGNOSIS — R42 LIGHTHEADEDNESS: ICD-10-CM

## 2024-11-25 DIAGNOSIS — I34.0 NONRHEUMATIC MITRAL VALVE REGURGITATION: Chronic | ICD-10-CM

## 2024-11-25 DIAGNOSIS — I10 ESSENTIAL HYPERTENSION: Chronic | ICD-10-CM

## 2024-11-25 DIAGNOSIS — E78.2 MIXED HYPERLIPIDEMIA: Chronic | ICD-10-CM

## 2024-11-25 DIAGNOSIS — I50.33 ACUTE ON CHRONIC HEART FAILURE WITH PRESERVED EJECTION FRACTION (HFPEF): Primary | ICD-10-CM

## 2024-11-25 DIAGNOSIS — I25.118 CORONARY ARTERY DISEASE OF NATIVE ARTERY OF NATIVE HEART WITH STABLE ANGINA PECTORIS: Chronic | ICD-10-CM

## 2024-11-25 DIAGNOSIS — I48.21 PERMANENT ATRIAL FIBRILLATION: Chronic | ICD-10-CM

## 2024-11-25 DIAGNOSIS — E66.9 OBESITY (BMI 30-39.9): Chronic | ICD-10-CM

## 2024-11-27 ENCOUNTER — TELEPHONE (OUTPATIENT)
Dept: CARDIOLOGY | Facility: CLINIC | Age: 74
End: 2024-11-27
Payer: MEDICARE

## 2024-11-27 ENCOUNTER — DOCUMENTATION (OUTPATIENT)
Dept: CARDIOLOGY | Facility: CLINIC | Age: 74
End: 2024-11-27
Payer: MEDICARE

## 2024-11-27 NOTE — TELEPHONE ENCOUNTER
Secondary insurance has denied cath so an appeal needs to be done. Catherine called and LM for patient that the cath has been canceled for 12/2. Working on submitting appeal.

## 2024-11-27 NOTE — PROGRESS NOTES
Spoke with Dr. Sandoval (sp?) with [facility] - secondary insurance  Procedure denied. Can submit appeal.  Email sent to financial dept.

## 2024-12-02 ENCOUNTER — ANESTHESIA EVENT (OUTPATIENT)
Dept: CARDIOLOGY | Facility: HOSPITAL | Age: 74
End: 2024-12-02
Payer: MEDICARE

## 2024-12-02 ENCOUNTER — TELEPHONE (OUTPATIENT)
Dept: CARDIOLOGY | Facility: CLINIC | Age: 74
End: 2024-12-02
Payer: MEDICARE

## 2024-12-02 ENCOUNTER — HOSPITAL ENCOUNTER (OUTPATIENT)
Dept: CARDIOLOGY | Facility: HOSPITAL | Age: 74
Setting detail: HOSPITAL OUTPATIENT SURGERY
Discharge: HOME | End: 2024-12-02
Payer: MEDICARE

## 2024-12-02 ENCOUNTER — ANESTHESIA (OUTPATIENT)
Dept: CARDIOLOGY | Facility: HOSPITAL | Age: 74
End: 2024-12-02
Payer: MEDICARE

## 2024-12-02 ENCOUNTER — HOSPITAL ENCOUNTER (OUTPATIENT)
Facility: HOSPITAL | Age: 74
Setting detail: HOSPITAL OUTPATIENT SURGERY
Discharge: HOME | End: 2024-12-02
Attending: INTERNAL MEDICINE | Admitting: INTERNAL MEDICINE
Payer: MEDICARE

## 2024-12-02 VITALS
OXYGEN SATURATION: 93 % | DIASTOLIC BLOOD PRESSURE: 63 MMHG | HEART RATE: 83 BPM | SYSTOLIC BLOOD PRESSURE: 139 MMHG | RESPIRATION RATE: 13 BRPM

## 2024-12-02 VITALS — OXYGEN SATURATION: 99 % | DIASTOLIC BLOOD PRESSURE: 60 MMHG | HEART RATE: 61 BPM | SYSTOLIC BLOOD PRESSURE: 140 MMHG

## 2024-12-02 VITALS
BODY MASS INDEX: 35.84 KG/M2 | HEART RATE: 65 BPM | WEIGHT: 189.82 LBS | SYSTOLIC BLOOD PRESSURE: 137 MMHG | DIASTOLIC BLOOD PRESSURE: 61 MMHG | OXYGEN SATURATION: 96 % | TEMPERATURE: 98.2 F | HEIGHT: 61 IN | RESPIRATION RATE: 24 BRPM

## 2024-12-02 DIAGNOSIS — I50.33 ACUTE ON CHRONIC HEART FAILURE WITH PRESERVED EJECTION FRACTION (HFPEF): ICD-10-CM

## 2024-12-02 DIAGNOSIS — I25.118 CORONARY ARTERY DISEASE OF NATIVE ARTERY OF NATIVE HEART WITH STABLE ANGINA PECTORIS: Chronic | ICD-10-CM

## 2024-12-02 DIAGNOSIS — I34.0 NONRHEUMATIC MITRAL VALVE REGURGITATION: Chronic | ICD-10-CM

## 2024-12-02 DIAGNOSIS — I25.118 CORONARY ARTERY DISEASE OF NATIVE ARTERY OF NATIVE HEART WITH STABLE ANGINA PECTORIS: ICD-10-CM

## 2024-12-02 DIAGNOSIS — I34.0 NONRHEUMATIC MITRAL VALVE REGURGITATION: ICD-10-CM

## 2024-12-02 PROBLEM — Q21.10 ASD (ATRIAL SEPTAL DEFECT): Status: ACTIVE | Noted: 2024-12-02

## 2024-12-02 LAB
ALBUMIN SERPL-MCNC: 4.2 G/DL (ref 3.5–5.2)
ALBUMIN/GLOB SERPL: 1.6 G/DL
ALP SERPL-CCNC: 56 U/L (ref 39–117)
ALT SERPL W P-5'-P-CCNC: 13 U/L (ref 1–33)
ANION GAP SERPL CALCULATED.3IONS-SCNC: 10.2 MMOL/L (ref 5–15)
AST SERPL-CCNC: 22 U/L (ref 1–32)
BASE DEFICIT: ABNORMAL
BASE DEFICIT: ABNORMAL
BASE EXCESS BLDA CALC-SCNC: 1 MMOL/L (ref 0–3)
BASE EXCESS BLDV CALC-SCNC: 2 MMOL/L (ref 0–3)
BASOPHILS # BLD AUTO: 0.07 10*3/MM3 (ref 0–0.2)
BASOPHILS NFR BLD AUTO: 0.9 % (ref 0–1.5)
BILIRUB SERPL-MCNC: 0.7 MG/DL (ref 0–1.2)
BUN SERPL-MCNC: 14 MG/DL (ref 8–23)
BUN/CREAT SERPL: 17.9 (ref 7–25)
CA-I BLDA-SCNC: 1.27 MMOL/L (ref 1.12–1.32)
CA-I BLDA-SCNC: 1.27 MMOL/L (ref 1.12–1.32)
CALCIUM SPEC-SCNC: 9.7 MG/DL (ref 8.6–10.5)
CHLORIDE SERPL-SCNC: 102 MMOL/L (ref 98–107)
CO2 BLDA-SCNC: 28 MMOL/L (ref 23–27)
CO2 CONTENT VENOUS: 29 MMOL/L (ref 24–29)
CO2 SERPL-SCNC: 26.8 MMOL/L (ref 22–29)
CREAT SERPL-MCNC: 0.78 MG/DL (ref 0.57–1)
DEPRECATED RDW RBC AUTO: 47.1 FL (ref 37–54)
EGFRCR SERPLBLD CKD-EPI 2021: 79.8 ML/MIN/1.73
EOSINOPHIL # BLD AUTO: 0.16 10*3/MM3 (ref 0–0.4)
EOSINOPHIL NFR BLD AUTO: 2 % (ref 0.3–6.2)
ERYTHROCYTE [DISTWIDTH] IN BLOOD BY AUTOMATED COUNT: 14 % (ref 12.3–15.4)
GLOBULIN UR ELPH-MCNC: 2.6 GM/DL
GLUCOSE BLDC GLUCOMTR-MCNC: 115 MG/DL (ref 70–105)
GLUCOSE BLDC GLUCOMTR-MCNC: 116 MG/DL (ref 70–105)
GLUCOSE SERPL-MCNC: 113 MG/DL (ref 65–99)
HCO3 BLDA-SCNC: 26.2 MMOL/L (ref 22–26)
HCO3 BLDV-SCNC: 27.7 MMOL/L (ref 23–28)
HCT VFR BLD AUTO: 34.7 % (ref 34–46.6)
HCT VFR BLDA CALC: 33 % (ref 38–51)
HCT VFR BLDA CALC: 35 % (ref 38–51)
HGB BLD-MCNC: 11.4 G/DL (ref 12–15.9)
HGB BLDA-MCNC: 11.2 G/DL (ref 12–17)
HGB BLDA-MCNC: 11.9 G/DL (ref 12–17)
IMM GRANULOCYTES # BLD AUTO: 0.03 10*3/MM3 (ref 0–0.05)
IMM GRANULOCYTES NFR BLD AUTO: 0.4 % (ref 0–0.5)
INR PPP: 1.21 (ref 2–3)
LYMPHOCYTES # BLD AUTO: 1.39 10*3/MM3 (ref 0.7–3.1)
LYMPHOCYTES NFR BLD AUTO: 17.4 % (ref 19.6–45.3)
MCH RBC QN AUTO: 30.1 PG (ref 26.6–33)
MCHC RBC AUTO-ENTMCNC: 32.9 G/DL (ref 31.5–35.7)
MCV RBC AUTO: 91.6 FL (ref 79–97)
MONOCYTES # BLD AUTO: 0.78 10*3/MM3 (ref 0.1–0.9)
MONOCYTES NFR BLD AUTO: 9.8 % (ref 5–12)
NEUTROPHILS NFR BLD AUTO: 5.56 10*3/MM3 (ref 1.7–7)
NEUTROPHILS NFR BLD AUTO: 69.5 % (ref 42.7–76)
NRBC BLD AUTO-RTO: 0 /100 WBC (ref 0–0.2)
NT-PROBNP SERPL-MCNC: 504 PG/ML (ref 0–900)
PCO2 BLDA: 44.7 MM HG (ref 35–45)
PCO2 BLDV: 49.4 MM HG (ref 41–51)
PH BLDA: 7.38 PH UNITS (ref 7.35–7.45)
PH BLDV: 7.36 PH UNITS (ref 7.31–7.41)
PLATELET # BLD AUTO: 242 10*3/MM3 (ref 140–450)
PMV BLD AUTO: 9.6 FL (ref 6–12)
PO2 BLDA: 83 MM HG (ref 80–105)
PO2 BLDV: 38 MM HG (ref 35–42)
POTASSIUM BLDA-SCNC: 3.9 MMOL/L (ref 3.5–4.9)
POTASSIUM BLDA-SCNC: 3.9 MMOL/L (ref 3.5–4.9)
POTASSIUM SERPL-SCNC: 4.1 MMOL/L (ref 3.5–5.2)
PROT SERPL-MCNC: 6.8 G/DL (ref 6–8.5)
PROTHROMBIN TIME: 15.3 SECONDS (ref 19.4–28.5)
RBC # BLD AUTO: 3.79 10*6/MM3 (ref 3.77–5.28)
SAO2 % BLDCOA: 96 % (ref 95–98)
SAO2 % BLDCOV: ABNORMAL %
SODIUM BLD-SCNC: 139 MMOL/L (ref 138–146)
SODIUM BLD-SCNC: 139 MMOL/L (ref 138–146)
SODIUM SERPL-SCNC: 139 MMOL/L (ref 136–145)
WBC NRBC COR # BLD AUTO: 7.99 10*3/MM3 (ref 3.4–10.8)

## 2024-12-02 PROCEDURE — 25010000002 FENTANYL CITRATE (PF) 100 MCG/2ML SOLUTION: Performed by: INTERNAL MEDICINE

## 2024-12-02 PROCEDURE — C1760 CLOSURE DEV, VASC: HCPCS | Performed by: INTERNAL MEDICINE

## 2024-12-02 PROCEDURE — 93461 R&L HRT ART/VENTRICLE ANGIO: CPT | Performed by: INTERNAL MEDICINE

## 2024-12-02 PROCEDURE — C1751 CATH, INF, PER/CENT/MIDLINE: HCPCS | Performed by: INTERNAL MEDICINE

## 2024-12-02 PROCEDURE — C1894 INTRO/SHEATH, NON-LASER: HCPCS | Performed by: INTERNAL MEDICINE

## 2024-12-02 PROCEDURE — 80053 COMPREHEN METABOLIC PANEL: CPT | Performed by: INTERNAL MEDICINE

## 2024-12-02 PROCEDURE — 25010000002 ONDANSETRON PER 1 MG: Performed by: INTERNAL MEDICINE

## 2024-12-02 PROCEDURE — 85025 COMPLETE CBC W/AUTO DIFF WBC: CPT | Performed by: INTERNAL MEDICINE

## 2024-12-02 PROCEDURE — 25010000002 LIDOCAINE PF 1% 1 % SOLUTION

## 2024-12-02 PROCEDURE — 85610 PROTHROMBIN TIME: CPT | Performed by: INTERNAL MEDICINE

## 2024-12-02 PROCEDURE — 93320 DOPPLER ECHO COMPLETE: CPT

## 2024-12-02 PROCEDURE — 93325 DOPPLER ECHO COLOR FLOW MAPG: CPT

## 2024-12-02 PROCEDURE — 25010000002 LIDOCAINE 1 % SOLUTION: Performed by: INTERNAL MEDICINE

## 2024-12-02 PROCEDURE — C1769 GUIDE WIRE: HCPCS | Performed by: INTERNAL MEDICINE

## 2024-12-02 PROCEDURE — 82947 ASSAY GLUCOSE BLOOD QUANT: CPT

## 2024-12-02 PROCEDURE — 25510000001 IOPAMIDOL PER 1 ML: Performed by: INTERNAL MEDICINE

## 2024-12-02 PROCEDURE — 85014 HEMATOCRIT: CPT

## 2024-12-02 PROCEDURE — 25010000002 MIDAZOLAM PER 1 MG: Performed by: INTERNAL MEDICINE

## 2024-12-02 PROCEDURE — 25010000002 PROPOFOL 10 MG/ML EMULSION

## 2024-12-02 PROCEDURE — 99152 MOD SED SAME PHYS/QHP 5/>YRS: CPT | Performed by: INTERNAL MEDICINE

## 2024-12-02 PROCEDURE — 82330 ASSAY OF CALCIUM: CPT

## 2024-12-02 PROCEDURE — 93312 ECHO TRANSESOPHAGEAL: CPT

## 2024-12-02 PROCEDURE — 83880 ASSAY OF NATRIURETIC PEPTIDE: CPT | Performed by: INTERNAL MEDICINE

## 2024-12-02 PROCEDURE — 84132 ASSAY OF SERUM POTASSIUM: CPT

## 2024-12-02 PROCEDURE — 25810000003 SODIUM CHLORIDE 0.9 % SOLUTION: Performed by: INTERNAL MEDICINE

## 2024-12-02 PROCEDURE — 84295 ASSAY OF SERUM SODIUM: CPT

## 2024-12-02 PROCEDURE — 99153 MOD SED SAME PHYS/QHP EA: CPT | Performed by: INTERNAL MEDICINE

## 2024-12-02 PROCEDURE — 93321 DOPPLER ECHO F-UP/LMTD STD: CPT

## 2024-12-02 PROCEDURE — 82803 BLOOD GASES ANY COMBINATION: CPT

## 2024-12-02 RX ORDER — ONDANSETRON 2 MG/ML
4 INJECTION INTRAMUSCULAR; INTRAVENOUS ONCE AS NEEDED
Status: DISCONTINUED | OUTPATIENT
Start: 2024-12-02 | End: 2024-12-02

## 2024-12-02 RX ORDER — EPHEDRINE SULFATE 5 MG/ML
5 INJECTION INTRAVENOUS ONCE AS NEEDED
Status: CANCELLED | OUTPATIENT
Start: 2024-12-02

## 2024-12-02 RX ORDER — HYDRALAZINE HYDROCHLORIDE 20 MG/ML
5 INJECTION INTRAMUSCULAR; INTRAVENOUS
Status: CANCELLED | OUTPATIENT
Start: 2024-12-02

## 2024-12-02 RX ORDER — ONDANSETRON 2 MG/ML
4 INJECTION INTRAMUSCULAR; INTRAVENOUS ONCE AS NEEDED
Status: CANCELLED | OUTPATIENT
Start: 2024-12-02

## 2024-12-02 RX ORDER — LABETALOL HYDROCHLORIDE 5 MG/ML
5 INJECTION, SOLUTION INTRAVENOUS
Status: CANCELLED | OUTPATIENT
Start: 2024-12-02

## 2024-12-02 RX ORDER — FENTANYL CITRATE 50 UG/ML
INJECTION, SOLUTION INTRAMUSCULAR; INTRAVENOUS
Status: DISCONTINUED | OUTPATIENT
Start: 2024-12-02 | End: 2024-12-02 | Stop reason: HOSPADM

## 2024-12-02 RX ORDER — LIDOCAINE HYDROCHLORIDE 10 MG/ML
INJECTION, SOLUTION INFILTRATION; PERINEURAL
Status: DISCONTINUED | OUTPATIENT
Start: 2024-12-02 | End: 2024-12-02 | Stop reason: HOSPADM

## 2024-12-02 RX ORDER — SODIUM CHLORIDE 9 MG/ML
INJECTION, SOLUTION INTRAVENOUS CONTINUOUS PRN
Status: DISCONTINUED | OUTPATIENT
Start: 2024-12-02 | End: 2024-12-02 | Stop reason: SURG

## 2024-12-02 RX ORDER — FUROSEMIDE 20 MG/1
20 TABLET ORAL DAILY
Qty: 90 TABLET | Refills: 3 | Status: SHIPPED | OUTPATIENT
Start: 2024-12-02

## 2024-12-02 RX ORDER — ONDANSETRON 2 MG/ML
4 INJECTION INTRAMUSCULAR; INTRAVENOUS EVERY 6 HOURS PRN
Status: DISCONTINUED | OUTPATIENT
Start: 2024-12-02 | End: 2024-12-02 | Stop reason: HOSPADM

## 2024-12-02 RX ORDER — HYDRALAZINE HYDROCHLORIDE 20 MG/ML
5 INJECTION INTRAMUSCULAR; INTRAVENOUS
Status: DISCONTINUED | OUTPATIENT
Start: 2024-12-02 | End: 2024-12-02 | Stop reason: HOSPADM

## 2024-12-02 RX ORDER — ONDANSETRON 4 MG/1
4 TABLET, ORALLY DISINTEGRATING ORAL EVERY 6 HOURS PRN
Status: DISCONTINUED | OUTPATIENT
Start: 2024-12-02 | End: 2024-12-02 | Stop reason: HOSPADM

## 2024-12-02 RX ORDER — SODIUM CHLORIDE 9 MG/ML
INJECTION, SOLUTION INTRAVENOUS
Status: COMPLETED | OUTPATIENT
Start: 2024-12-02 | End: 2024-12-02

## 2024-12-02 RX ORDER — IPRATROPIUM BROMIDE AND ALBUTEROL SULFATE 2.5; .5 MG/3ML; MG/3ML
3 SOLUTION RESPIRATORY (INHALATION) ONCE AS NEEDED
Status: CANCELLED | OUTPATIENT
Start: 2024-12-02

## 2024-12-02 RX ORDER — NITROGLYCERIN 0.4 MG/1
0.4 TABLET SUBLINGUAL
Status: DISCONTINUED | OUTPATIENT
Start: 2024-12-02 | End: 2024-12-02 | Stop reason: HOSPADM

## 2024-12-02 RX ORDER — EPHEDRINE SULFATE 5 MG/ML
5 INJECTION INTRAVENOUS ONCE AS NEEDED
Status: DISCONTINUED | OUTPATIENT
Start: 2024-12-02 | End: 2024-12-02 | Stop reason: HOSPADM

## 2024-12-02 RX ORDER — LIDOCAINE HYDROCHLORIDE 10 MG/ML
INJECTION, SOLUTION EPIDURAL; INFILTRATION; INTRACAUDAL; PERINEURAL AS NEEDED
Status: DISCONTINUED | OUTPATIENT
Start: 2024-12-02 | End: 2024-12-02 | Stop reason: SURG

## 2024-12-02 RX ORDER — IPRATROPIUM BROMIDE AND ALBUTEROL SULFATE 2.5; .5 MG/3ML; MG/3ML
3 SOLUTION RESPIRATORY (INHALATION) ONCE AS NEEDED
Status: DISCONTINUED | OUTPATIENT
Start: 2024-12-02 | End: 2024-12-02 | Stop reason: HOSPADM

## 2024-12-02 RX ORDER — DIPHENHYDRAMINE HCL 25 MG
25 CAPSULE ORAL EVERY 6 HOURS PRN
Status: DISCONTINUED | OUTPATIENT
Start: 2024-12-02 | End: 2024-12-02 | Stop reason: HOSPADM

## 2024-12-02 RX ORDER — IOPAMIDOL 755 MG/ML
INJECTION, SOLUTION INTRAVASCULAR
Status: DISCONTINUED | OUTPATIENT
Start: 2024-12-02 | End: 2024-12-02 | Stop reason: HOSPADM

## 2024-12-02 RX ORDER — MIDAZOLAM HYDROCHLORIDE 1 MG/ML
INJECTION, SOLUTION INTRAMUSCULAR; INTRAVENOUS
Status: DISCONTINUED | OUTPATIENT
Start: 2024-12-02 | End: 2024-12-02 | Stop reason: HOSPADM

## 2024-12-02 RX ORDER — PROPOFOL 10 MG/ML
VIAL (ML) INTRAVENOUS AS NEEDED
Status: DISCONTINUED | OUTPATIENT
Start: 2024-12-02 | End: 2024-12-02 | Stop reason: SURG

## 2024-12-02 RX ORDER — ACETAMINOPHEN 325 MG/1
650 TABLET ORAL EVERY 4 HOURS PRN
Status: DISCONTINUED | OUTPATIENT
Start: 2024-12-02 | End: 2024-12-02 | Stop reason: HOSPADM

## 2024-12-02 RX ORDER — CHLORAL HYDRATE 500 MG
1000 CAPSULE ORAL
COMMUNITY

## 2024-12-02 RX ORDER — LABETALOL HYDROCHLORIDE 5 MG/ML
5 INJECTION, SOLUTION INTRAVENOUS
Status: DISCONTINUED | OUTPATIENT
Start: 2024-12-02 | End: 2024-12-02 | Stop reason: HOSPADM

## 2024-12-02 RX ADMIN — ONDANSETRON 4 MG: 2 INJECTION INTRAMUSCULAR; INTRAVENOUS at 17:20

## 2024-12-02 RX ADMIN — PROPOFOL 50 MG: 10 INJECTION, EMULSION INTRAVENOUS at 13:00

## 2024-12-02 RX ADMIN — PROPOFOL 50 MG: 10 INJECTION, EMULSION INTRAVENOUS at 13:06

## 2024-12-02 RX ADMIN — SODIUM CHLORIDE: 9 INJECTION, SOLUTION INTRAVENOUS at 12:52

## 2024-12-02 RX ADMIN — PROPOFOL 50 MG: 10 INJECTION, EMULSION INTRAVENOUS at 13:03

## 2024-12-02 RX ADMIN — LIDOCAINE HYDROCHLORIDE 60 MG: 10 INJECTION, SOLUTION EPIDURAL; INFILTRATION; INTRACAUDAL; PERINEURAL at 13:00

## 2024-12-02 NOTE — Clinical Note
The DP pulses are +2 bilaterally. The left DP pulse is +2. The PT pulses are +1 bilaterally. The left PT pulse is +1.

## 2024-12-02 NOTE — TELEPHONE ENCOUNTER
I called and left messages on patients and patients daughters phones on wed.27 2024 letting her know not to come Monday for her gema and heart cath due to her  secondary insurance denied the procedure . Patient called in on Monday and said she received her approval letter and she had stopped her warfin and she wanted to know what time to show up to the hospital. She was informed and I got the procedure added back on per patient.

## 2024-12-02 NOTE — ANESTHESIA POSTPROCEDURE EVALUATION
Patient: Mary Deleon    Procedure Summary       Date: 12/02/24 Room / Location: [facility]    Anesthesia Start: 1252 Anesthesia Stop: 1311    Procedure: ADULT TRANSESOPHAGEAL ECHO (HERBERT) W/ CONT IF NECESSARY PER PROTOCOL Diagnosis:       Acute on chronic heart failure with preserved ejection fraction (HFpEF)      Nonrheumatic mitral valve regurgitation      Coronary artery disease of native artery of native heart with stable angina pectoris      (Valvular Disease)      (Mitral Valve Assessment)    Scheduled Providers: Anand Madden MD Provider: Roland Carlson MD    Anesthesia Type: general ASA Status: 4            Anesthesia Type: general    Vitals  Vitals Value Taken Time   /66 12/02/24 1314   Temp     Pulse 65 12/02/24 1315   Resp 24 12/02/24 1309   SpO2 96 % 12/02/24 1315   Vitals shown include unfiled device data.        Post Anesthesia Care and Evaluation    Patient location during evaluation: PHASE II  Patient participation: complete - patient participated  Level of consciousness: awake  Pain scale: See nurse's notes for pain score.  Pain management: adequate    Airway patency: patent  Anesthetic complications: No anesthetic complications  PONV Status: none  Cardiovascular status: acceptable  Respiratory status: acceptable and spontaneous ventilation  Hydration status: acceptable    Comments: Patient seen and examined postoperatively; vital signs stable; SpO2 greater than or equal to 90%; cardiopulmonary status stable; nausea/vomiting adequately controlled; pain adequately controlled; no apparent anesthesia complications; patient discharged from anesthesia care when discharge criteria were met

## 2024-12-02 NOTE — INTERVAL H&P NOTE
H&P reviewed. The patient was examined and there are no changes to the H&P.      Patient with newly diagnosed acute on chronic heart failure with midrange ejection fraction.  Volume overload with allergies to diuretics.  Echocardiogram also shows at least moderate mitral regurgitation.  Proceed with HERBERT and right and left heart cath.  Femoral access due to CABG

## 2024-12-02 NOTE — DISCHARGE INSTRUCTIONS
Post Cath Instructions  Drink plenty of water for the next 24 hours. This helps to eliminate the dye used in your procedure through urination.  You may resume a normal diet; however, try to avoid foods that would cause gas or constipation.    What to do for pain: acetaminophen (Tylenol), not ibuprofen (Advil) can be used for puncture site tenderness. If your pain is unmanageable with this method, call the Cardiologist's office.     Sedative medication (Anesthesia) given to you during your catheterization may decrease your judgement and reaction time for up to 24-48 hours.  Therefore:  DO NOT drive, operate hazardous machinery, or consume alcoholic beverages for 24 hours.   DO NOT make any important/legal decisions for 24 hours.   Have someone stay with you for at least 24 hours.    For the next 48 hours (to allow proper healing and prevent bleeding):  Avoid excessive bending at wound site  Avoid straining (anything that would tense up muscles around the affected puncture site)  Avoid lifting, pushing, or pulling objects greater than 5 pounds, for 5 days  For Groin Cases:  Refrain from running, vigorous walking, and sexual activity  No prolonged sitting or standing   Limit stair climbing as much as possible    Keep the puncture site clean and dry.  After 24 hours, remove the dressing and replace it with a Band-Aid for at least one additional day.  Gently clean the site with mild soap and water.  No scrubbing/rubbing, and lightly pat the area dry.  Showers are acceptable; however, avoid submerging in water (tub baths, hot tubs, pools, dishwater, etc…) for at least one week.  The site should be completely healed before resuming these activities to reduce the risk of infection. Watch for signs and symptoms of infection and notify the physician of any of the following:  Bleeding or an increase in swelling, redness, or warmth at the puncture site  Fever  Increased soreness around puncture site  Foul odor or significant  drainage from the puncture site  **A bruise or small “pea sized” lump under the skin at the puncture site is not unusual.  This should disappear within 3-4 weeks.**    CONTACT YOUR PHYSICIAN OR CALL 911 IF YOU EXPERIENCE ANY OF THE FOLLOWING:  Increased angina (chest pain) or frequent sensations of pressure, burning, pain, or other discomfort in the chest, arm, jaws, or stomach  Lightheadedness, dizziness, faint feeling, sweating, or difficulty breathing  Odd changes in sedation (like numbness, tingling, coldness, or pain) or color (pale/bluish) in the arm or leg in which the catheter was inserted.    IMPORTANT:  Although this occurs very rarely, if you should develop bright red or excessive bleeding, feel a “pop” inside at the insertion site, or notice a sudden increase in swelling larger than a walnut, you should call 911.  Hold continuous firm pressure to the access site until emergency personnel arrive.  It is best if someone else can do this for you.

## 2024-12-02 NOTE — ANESTHESIA PREPROCEDURE EVALUATION
Anesthesia Evaluation     Patient summary reviewed and Nursing notes reviewed   NPO Solid Status: > 8 hours  NPO Liquid Status: > 8 hours           Airway   Mallampati: II  TM distance: >3 FB  Neck ROM: full  No difficulty expected  Dental - normal exam     Pulmonary - normal exam    breath sounds clear to auscultation  (+) COPD,recent URI  Cardiovascular - normal exam  Exercise tolerance: unable to assess    ECG reviewed  Rhythm: regular  Rate: normal    (+) hypertension, valvular problems/murmurs (MOD MR) MR, CAD, CABG >6 Months, dysrhythmias Paroxysmal Atrial Fib, Atrial Flutter, CHF Diastolic >=55%, hyperlipidemia    ROS comment: Interpretation Summary       ·  Left ventricular systolic function is normal. Calculated left ventricular EF = 65% Left ventricular ejection fraction appears to be 61 - 65%.  ·  Left ventricular diastolic dysfunction is noted.  ·  The left atrial cavity is dilated.  ·  Moderate mitral valve regurgitation is present.  ·  Estimated right ventricular systolic pressure from tricuspid regurgitation is mildly elevated (35-45 mmHg).         Neuro/Psych  (+) psychiatric history Anxiety  GI/Hepatic/Renal/Endo    (+) obesity, diabetes mellitus, thyroid problem hypothyroidism    Musculoskeletal     Abdominal  - normal exam   Substance History - negative use     OB/GYN negative ob/gyn ROS         Other   arthritis,     ROS/Med Hx Other:   Assessment  Diagnoses and all orders for this visit:     1. Acute on chronic heart failure with preserved ejection fraction (HFpEF) (Primary)  -     Case Request Cath Lab: Right and Left Heart Cath with Coronar Angiography  -     CBC (No Diff); Future  -     Basic Metabolic Panel; Future  -     Protime-INR; Future  -     Lipid Panel; Future  -     Adult Transesophageal Echo (HERBERT) W/ Cont if Necessary Per Protocol; Future     2. Bilateral leg edema     3. Lightheadedness     4. Nonrheumatic mitral valve regurgitation  -     Case Request Cath Lab: Right and Left  Heart Cath with Coronar Angiography  -     CBC (No Diff); Future  -     Basic Metabolic Panel; Future  -     Protime-INR; Future  -     Lipid Panel; Future  -     Adult Transesophageal Echo (HERBERT) W/ Cont if Necessary Per Protocol; Future     5. Coronary artery disease of native artery of native heart with stable angina pectoris  -     Case Request Cath Lab: Right and Left Heart Cath with Coronar Angiography  -     CBC (No Diff); Future  -     Basic Metabolic Panel; Future  -     Protime-INR; Future  -     Lipid Panel; Future  -     Adult Transesophageal Echo (HERBERT) W/ Cont if Necessary Per Protocol; Future     6. Mixed hyperlipidemia     7. Permanent atrial fibrillation     8. Essential hypertension     9. Type 2 diabetes mellitus without complication, without long-term current use of insulin     10. Hypothyroidism, unspecified type     11. Obesity (BMI 30-39.9)                        Anesthesia Plan    ASA 4     general     (HERBERT followed by a CATH)  intravenous induction     Anesthetic plan, risks, benefits, and alternatives have been provided, discussed and informed consent has been obtained with: patient.      CODE STATUS:

## 2024-12-03 NOTE — TELEPHONE ENCOUNTER
According to Ladan patient wanted to proceed with procedure. Patient had procedure yesterday on 12/2.

## 2024-12-20 NOTE — PROGRESS NOTES
Subjective:     Encounter Date:12/23/2024        Patient ID: Mary Deleon 1950     Chief Complaint:  hospital discharge follow-up , INR check    History of Present Illness:  Mary Deleon is a 74 y.o. female with a history of coronary artery disease status post CABG in 2017 with MAZE procedure, hypertension, hyperlipidemia, atrial flutter/fibrillation status post ablation x 2 by Dr. Rios, chronic HFpEF, mitral regurgitation and hypothyroidism who recently underwent outpatient transesophageal echocardiogram and cardiac catheterization by Dr. Madden. She presents to the office today for hospital discharge follow-up and INR check. She states her leg swelling has greatly improved. She has not had to take any Lasix. She reports occasional palpitations that are associated with dyspnea on exertion, but states they improve with rest. She denies any other complaints.       Review of systems:  Review of Systems   Constitutional: Negative for malaise/fatigue.   Cardiovascular:  Positive for dyspnea on exertion and palpitations. Negative for chest pain and leg swelling.   Respiratory:  Negative for shortness of breath.    Skin:  Negative for rash.   Neurological:  Negative for dizziness, light-headedness and numbness.         The following portions of the patient's history were reviewed and updated as appropriate: allergies, current medications, past family history, past medical history, past social history, past surgical history and problem list.    Past Medical History:  Past Medical History:   Diagnosis Date    Abnormal nuclear stress test 12/11/2020    Added automatically from request for surgery [id number]      Arthritis     Atrial fibrillation     Atrial flutter 10/18/2018    Chronic heart failure with preserved ejection fraction (HFpEF) 08/06/2024    COPD (chronic obstructive pulmonary disease)     Coronary artery disease     Depression     Diverticulitis     Hyperlipidemia     Hypertension      "Mitral valve prolapse     Nonrheumatic mitral valve regurgitation 01/02/2019       Past Surgical History:  Past Surgical History:   Procedure Laterality Date    CARDIAC CATHETERIZATION N/A 01/11/2021    Procedure: Coronary angiography;  Surgeon: Al Garcia MD;  Location: [facility] CATH INVASIVE LOCATION;  Service: Cardiovascular;  Laterality: N/A;    CARDIAC CATHETERIZATION N/A 01/11/2021    Procedure: Saphenous Vein Graft;  Surgeon: Al Garcia MD;  Location: [facility] CATH INVASIVE LOCATION;  Service: Cardiovascular;  Laterality: N/A;    CARDIAC CATHETERIZATION N/A 12/2/2024    Procedure: Right and Left Heart Cath with Coronar Angiography;  Surgeon: Anand Madden MD;  Location: [facility] CATH INVASIVE LOCATION;  Service: Cardiovascular;  Laterality: N/A;    CATARACT EXTRACTION, BILATERAL Bilateral     HYSTERECTOMY      KNEE SURGERY      x2        Allergies:  Allergies   Allergen Reactions    Amlodipine Swelling     Leg swelling    Lasix [Furosemide] Dizziness    Lisinopril Dizziness    Omeprazole Hives    Sulfa Antibiotics Hives    Nitrofurantoin Other (See Comments)     .    Vancomycin Other (See Comments)     \"red man syndrome\" - turned red from head to toe    Codeine GI Intolerance    Naproxen Rash    Naproxen Sodium Rash    Tylenol With Codeine #3 [Acetaminophen-Codeine] GI Intolerance       Current Meds:     Current Outpatient Medications:     albuterol sulfate  (90 Base) MCG/ACT inhaler, Inhale 2 puffs Every 4 (Four) Hours As Needed., Disp: , Rfl:     aspirin 81 MG EC tablet, Take 1 tablet by mouth Daily., Disp: , Rfl:     atorvastatin (LIPITOR) 40 MG tablet, TAKE 1 TABLET BY MOUTH ONCE DAILY AT BEDTIME, Disp: 90 tablet, Rfl: 3    BREO ELLIPTA 100-25 MCG/INH inhaler, Inhale 1 puff Daily., Disp: , Rfl: 5    Docusate Sodium (COLACE PO), Take 2 capsules by mouth 2 (two) times a day., Disp: , Rfl:     furosemide (LASIX) 20 MG tablet, Take 1 tablet by mouth Daily., Disp: 90 tablet, " Rfl: 3    isosorbide dinitrate (ISORDIL) 10 MG tablet, Take 1 tablet by mouth 3 (Three) Times a Day., Disp: 270 tablet, Rfl: 3    levothyroxine (SYNTHROID, LEVOTHROID) 25 MCG tablet, Take 1 tablet by mouth Daily., Disp: , Rfl:     metFORMIN (GLUCOPHAGE) 500 MG tablet, Take 1 tablet by mouth 2 (Two) Times a Day., Disp: , Rfl: 1    metoprolol succinate XL (TOPROL-XL) 25 MG 24 hr tablet, Take 1 tablet by mouth once daily, Disp: 90 tablet, Rfl: 0    montelukast (SINGULAIR) 10 MG tablet, Take 1 tablet by mouth Daily., Disp: , Rfl:     Omega-3 Fatty Acids (fish oil) 1000 MG capsule capsule, Take 1 capsule by mouth Daily With Breakfast., Disp: , Rfl:     Probiotic Product (PROBIOTIC-10 PO), Take  by mouth., Disp: , Rfl:     SPIRIVA RESPIMAT 1.25 MCG/ACT aerosol solution inhaler, Inhale 2 puffs Daily., Disp: , Rfl: 1    vitamin D3 125 MCG (5000 UT) capsule capsule, Take 1 capsule by mouth 2 (Two) Times a Day., Disp: , Rfl:     warfarin (COUMADIN) 4 MG tablet, Take one tablet by mouth daily except Tuesday or as directed, Disp: 90 tablet, Rfl: 0    ONETOUCH DELICA LANCETS FINE misc, USE TO CHECK GLUCOSE TWICE DAILY, Disp: , Rfl: 5    ONETOUCH VERIO test strip, USE TO CHECK GLUCOSE TWICE DAILY, Disp: , Rfl: 3    Social History:   Social History     Tobacco Use    Smoking status: Never     Passive exposure: Yes    Smokeless tobacco: Never   Substance Use Topics    Alcohol use: No        Family History:  Family History   Problem Relation Age of Onset    Heart disease Mother     Pneumonia Mother     Asthma Father     Alzheimer's disease Father     Heart disease Father                  Objective:       Physical Exam:  Vitals reviewed.   Constitutional:       Appearance: Healthy appearance. Well-developed and well-groomed. Obese.   Eyes:      Conjunctiva/sclera: Conjunctivae normal.      Pupils: Pupils are equal, round, and reactive to light.   HENT:      Head: Normocephalic and atraumatic.    Mouth/Throat:      Mouth: Mucous  "membranes are moist.      Pharynx: Oropharynx is clear.   Pulmonary:      Effort: Pulmonary effort is normal.      Breath sounds: Normal breath sounds.   Cardiovascular:      PMI at left midclavicular line. Bradycardia present. Regular rhythm.   Pulses:     Intact distal pulses.   Edema:     Peripheral edema absent.   Abdominal:      General: Bowel sounds are normal.      Palpations: Abdomen is soft.   Musculoskeletal: Normal range of motion.      Cervical back: Normal range of motion. Skin:     General: Skin is warm and dry.   Neurological:      Mental Status: Alert and oriented to person, place, and time.   Psychiatric:         Mood and Affect: Mood normal.         Behavior: Behavior normal.         Vital signs:  /58   Pulse 52   Ht 154.9 cm (61\")   Wt 86.6 kg (191 lb)   LMP  (LMP Unknown)   SpO2 97%   BMI 36.09 kg/m²       Recent labs reviewed:    CBC        BMP        CMP       Creatinine Clearance  Estimated Creatinine Clearance: 62.3 mL/min (by C-G formula based on SCr of 0.78 mg/dL).    BNP        TROPONIN        CoAg  Results from last 7 days   Lab Units 12/23/24  1058   INR  2.60         Cardiology results reviewed:    Echocardiogram  Results for orders placed during the hospital encounter of 12/02/24    Adult Transesophageal Echo (HERBERT) W/ Cont if Necessary Per Protocol    Interpretation Summary    Left ventricular ejection fraction appears to be 56 - 60%.    The right ventricular cavity is dilated.    The left atrial cavity is dilated.  Left atrial appendage has no thrombus and is patent.    The right atrial cavity is dilated.    Estimated right ventricular systolic pressure from tricuspid regurgitation is mildly elevated (35-45 mmHg).    There is moderate plaque in the ascending aorta present.    Mild to moderate mitral valve regurgitation is noted    Moderate ASD with left-to-right shunt is noted.      Cardiac catheterization  Results for orders placed during the hospital encounter of " 12/02/24    Cardiac Catheterization/Vascular Study    Conclusion    Anand Madden M.D. (Attending Cardiologist)    PROCEDURE PERFORMED  Ultrasound guided vascular access  Right heart catheterization  Coronary Angiogram  Bypass angiography  Left Heart Catheterization  Moderate Sedation    INDICATIONS FOR PROCEDURE  73 years old woman with shortness of breath, coronary artery disease, pulmonary hypertension status post CABG, mitral valve regurgitation, PFO/ASD.  After discussing the risk and benefit of the procedure she was offered a right and left heart cath.    PROCEDURE IN DETAIL  Informed consent was obtained from the patient after explaining the risks, benefits, and alternative options of the procedure. After obtaining informed consent, the patient was brought to the cath lab and was prepped in a sterile fashion. Lidocaine 2% was used for local anesthesia into the right femoral venous access site. Right femoral vein was accessed using the micropuncture needle under ultrasound guidance and micropuncture wire advanced under flouroscopy. A 7 Tajik vascular sheath was put into place percutaneously over guide-wire. Guide wires were removed. A 6Fr swan tobias catheter was advanced to wedge position. RA, RV and PA and wedge pressures were recorded.  PA sat and arterial sats recorded.  The patient tolerated the procedure well without any complications.    Lidocaine 2% was used for local anesthesia into the right femoral arterial access site. The right femoral artery was accessed with a micropuncture needle via modified Seldinger technique under ultrasound guidance. A 6F was inserted successfully.  Afterwards, 6F JR4 and JL4 diagnostic catheters were advanced over a wire into the ascending aorta and were used to engage the ostia of the left main and RCA respectively. JR4 used to cross the AV and obtain LV pressures and gradient across the AV measured via pullback technique. Images of the right and left coronary  systems were obtained.  JR4 catheter was used to selectively engage the ostium of left subclavian artery.  This was exchanged for internal mammary artery catheter and selective LIMA angiography was then performed.  JR4 catheter was also used to selectively engage the vein graft supplying the RCA and left circumflex arteries.    All the catheters were exchanged over a wire and subsequently removed. The patient tolerated the procedure well without any complications. The pictures were reviewed at the end of the procedure. A Mynx closure device was applied on the venous access site and 6 [place] Angio-Seal was applied on the arterial access site.    HEMODYNAMICS    RHC  RA 10/10, 8 mmHg  RV 43/3, 10 mmHg  PA 43/13, 25 mmHg  PCW 30/18, 18 mmHg  AO Sat 96%  PA Sat 69%    Nica CO 5.6 L/min    Nica CI 3.2 L/min/m²    SVR: 1298 dynes-sec/cm5 (normal 700-1600)  PVR: 100 dynes-sec/cm5 (normal )    LHC  LV: 157/6, 12 mmHg  AO: 157/62, 98 mmHg  Gradient: No significant gradient across the aortic valve    FINDINGS  Coronary Angiogram    Codominant circulation    Left main: Left main is a large caliber vessel which gives rise to the Left Anterior Descending and the Left circumflex.  Left main coronary artery is angiographically free from any significant disease.    Left Anterior Descending Artery: LAD has 50% stenosis in the proximal segment, 90% stenosis in the midsegment.  Distal LAD is 100% occluded    Left Circumflex: Left circumflex artery gives rise to marginals.  Proximal left circumflex has 40% stenosis, mid left circumflex has 50% stenosis.  Left circumflex is a co-dominant vessel that also gives rise to LPDA.  This is unchanged when compared to previous cardiac catheterization in 2021.    Right Coronary Artery: The RCA is a large caliber dominant vessel gives rise to PDA and PLV.  Proximal RCA has 50% stenosis, mid RCA has 60% stenosis.  PDA retrogradely fills the vein graft.    Bypass angiography    LIMA to LAD:  Widely patent at the origin, body and anastomosis.  Competitive flow noted.    SVG to RCA: Proximal 30% stenosis.  The remaining graft is angiographically free from any significant disease and supplies the PDA which retrogradely fills the entire RCA.    SVG to left circumflex: Totally occluded    ESTIMATED BLOOD LOSS:  10 ml    COMPLICATIONS:  None    PROCEDURE DATA:  Contrast Used: 40 cc    Sedation Time: 45 minutes    IMPRESSIONS  Patent LIMA to LAD, vein graft to RCA.  Patent left circumflex artery.  Normal LVEDP, mildly elevated wedge pressure  Normal cardiac output and index.  Mild pulmonary hypertension    RECOMMENDATIONS  -Lasix as needed  -Continue medical management of HFpEF and CAD    Electronically signed by Anand Madden MD, 12/02/24, 2:54 PM EST.               Assessment:         Diagnoses and all orders for this visit:    1. Coronary artery disease of native artery of native heart with stable angina pectoris (Primary)    2. Mixed hyperlipidemia    3. Essential hypertension    4. Chronic heart failure with preserved ejection fraction (HFpEF)    5. Nonrheumatic mitral valve regurgitation    6. Permanent atrial fibrillation    7. ASD (atrial septal defect)    8. Type 2 diabetes mellitus without complication, without long-term current use of insulin    9. Hypothyroidism, unspecified type    10. Obesity (BMI 30-39.9)    11. Hospital discharge follow-up                Plan:       Coronary artery disease of native artery of native heart with stable angina pectoris  History of CABG  Recent cardiac catheterization showed patent LIMA to LAD, vein graft to RCA, patent left circumflex artery, normal LVEDP, mildly elevated wedge pressure, normal cardiac output and index, and mild pulmonary hypertension.  Continue aspirin, high intensity statin, isosorbide dinitrate and beta blocker     Mixed hyperlipidemia  Continue atorvastatin   Goal LDL <70     Essential hypertension, chronic  Blood pressure is controlled  She  has been intolerant to multiple medications, including lisinopril, Lasix, and amlodipine.   Continue nitrate and beta blocker     Chronic HFpEF  Mitral regurgitation  Echocardiogram done July 2024 shows preserved LV function, diastolic dysfunction and moderate mitral valve regurgitation.  Recent HERBERT showed LVEF 56-60%, dilated RV, LA, and RA, left atrial appendage without thrombus, estimated RVSP 35-45 mmHg, moderate plaque in the ascending aorta, mild to moderate MR, and moderate ASD with left-to-right shunt.   Continue beta blocker and nitrate  Patient reluctant to start SGLT2-I  She will take Lasix as needed for leg swelling/weight gain.  Daily weight recommended  2 g sodium diet recommended    Permanent atrial fibrillation  Followed by EP, Dr. Rios  Continue beta blocker for rate control  [id number] score 6  Continue warfarin   Goal INR 2.0-3.0   INR today is 2.60     ASD  Secondary to previous ablation procedure    Type 2 diabetes mellitus without complication, without long-term current use of insulin  On metformin      Hypothyroidism, unspecified type  On levothyroxine      Obesity   BMI is 36.09  Lifestyle modifications recommended    Hospital discharge follow-up   Recent HERBERT and heart cath results reviewed with patient.  Continue medical management.  She will follow-up with Dr. Madden in 6 months.       Electronically signed by SILVANO Mittal, 12/23/24, 11:33 AM EST.

## 2024-12-23 ENCOUNTER — OFFICE VISIT (OUTPATIENT)
Dept: CARDIOLOGY | Facility: CLINIC | Age: 74
End: 2024-12-23
Payer: MEDICARE

## 2024-12-23 ENCOUNTER — ANTICOAGULATION VISIT (OUTPATIENT)
Dept: CARDIOLOGY | Facility: CLINIC | Age: 74
End: 2024-12-23
Payer: MEDICARE

## 2024-12-23 VITALS
HEIGHT: 61 IN | DIASTOLIC BLOOD PRESSURE: 58 MMHG | SYSTOLIC BLOOD PRESSURE: 112 MMHG | WEIGHT: 191 LBS | HEART RATE: 52 BPM | OXYGEN SATURATION: 97 % | BODY MASS INDEX: 36.06 KG/M2

## 2024-12-23 DIAGNOSIS — I48.21 PERMANENT ATRIAL FIBRILLATION: Primary | Chronic | ICD-10-CM

## 2024-12-23 DIAGNOSIS — E66.9 OBESITY (BMI 30-39.9): Chronic | ICD-10-CM

## 2024-12-23 DIAGNOSIS — I48.21 PERMANENT ATRIAL FIBRILLATION: Chronic | ICD-10-CM

## 2024-12-23 DIAGNOSIS — E78.2 MIXED HYPERLIPIDEMIA: Chronic | ICD-10-CM

## 2024-12-23 DIAGNOSIS — I34.0 NONRHEUMATIC MITRAL VALVE REGURGITATION: Chronic | ICD-10-CM

## 2024-12-23 DIAGNOSIS — E03.9 HYPOTHYROIDISM, UNSPECIFIED TYPE: Chronic | ICD-10-CM

## 2024-12-23 DIAGNOSIS — I25.118 CORONARY ARTERY DISEASE OF NATIVE ARTERY OF NATIVE HEART WITH STABLE ANGINA PECTORIS: Primary | Chronic | ICD-10-CM

## 2024-12-23 DIAGNOSIS — E11.9 TYPE 2 DIABETES MELLITUS WITHOUT COMPLICATION, WITHOUT LONG-TERM CURRENT USE OF INSULIN: Chronic | ICD-10-CM

## 2024-12-23 DIAGNOSIS — Z09 HOSPITAL DISCHARGE FOLLOW-UP: ICD-10-CM

## 2024-12-23 DIAGNOSIS — I50.32 CHRONIC HEART FAILURE WITH PRESERVED EJECTION FRACTION (HFPEF): Chronic | ICD-10-CM

## 2024-12-23 DIAGNOSIS — I10 ESSENTIAL HYPERTENSION: Chronic | ICD-10-CM

## 2024-12-23 DIAGNOSIS — Z79.01 LONG TERM (CURRENT) USE OF ANTICOAGULANTS: ICD-10-CM

## 2024-12-23 DIAGNOSIS — Q21.10 ASD (ATRIAL SEPTAL DEFECT): ICD-10-CM

## 2024-12-23 LAB — INR PPP: 2.6 (ref 2–3)

## 2024-12-23 PROCEDURE — 36416 COLLJ CAPILLARY BLOOD SPEC: CPT | Performed by: NURSE PRACTITIONER

## 2024-12-23 PROCEDURE — 85610 PROTHROMBIN TIME: CPT | Performed by: NURSE PRACTITIONER

## 2024-12-31 RX ORDER — METOPROLOL SUCCINATE 25 MG/1
25 TABLET, EXTENDED RELEASE ORAL DAILY
Qty: 90 TABLET | Refills: 3 | Status: SHIPPED | OUTPATIENT
Start: 2024-12-31

## 2025-01-27 ENCOUNTER — ANTICOAGULATION VISIT (OUTPATIENT)
Dept: CARDIOLOGY | Facility: CLINIC | Age: 75
End: 2025-01-27
Payer: MEDICARE

## 2025-01-27 VITALS
SYSTOLIC BLOOD PRESSURE: 164 MMHG | HEART RATE: 62 BPM | WEIGHT: 194 LBS | BODY MASS INDEX: 36.66 KG/M2 | DIASTOLIC BLOOD PRESSURE: 68 MMHG

## 2025-01-27 DIAGNOSIS — Z79.01 LONG TERM (CURRENT) USE OF ANTICOAGULANTS: ICD-10-CM

## 2025-01-27 DIAGNOSIS — I48.21 PERMANENT ATRIAL FIBRILLATION: Primary | Chronic | ICD-10-CM

## 2025-01-27 LAB — INR PPP: 2.5 (ref 0.9–1.1)

## 2025-01-27 PROCEDURE — 85610 PROTHROMBIN TIME: CPT | Performed by: INTERNAL MEDICINE

## 2025-01-27 PROCEDURE — 36416 COLLJ CAPILLARY BLOOD SPEC: CPT | Performed by: INTERNAL MEDICINE

## 2025-02-17 RX ORDER — ATORVASTATIN CALCIUM 40 MG/1
40 TABLET, FILM COATED ORAL
Qty: 90 TABLET | Refills: 1 | Status: SHIPPED | OUTPATIENT
Start: 2025-02-17

## 2025-02-17 NOTE — TELEPHONE ENCOUNTER
Rx Refill Note  Requested Prescriptions     Signed Prescriptions Disp Refills    atorvastatin (LIPITOR) 40 MG tablet 90 tablet 1     Sig: TAKE 1 TABLET BY MOUTH ONCE DAILY AT BEDTIME     Authorizing Provider: DEMARCO HARRISON     Ordering User: RODNEY TERRY      Last office visit with prescribing clinician: 8/6/2024   Last telemedicine visit with prescribing clinician: Visit date not found   Next office visit with prescribing clinician: 7/11/2025                         Would you like a call back once the refill request has been completed: [] Yes [] No    If the office needs to give you a call back, can they leave a voicemail: [] Yes [] No    Rodney Terry MA  02/17/25, 13:02 EST

## 2025-03-05 ENCOUNTER — TELEPHONE (OUTPATIENT)
Dept: CARDIOLOGY | Facility: CLINIC | Age: 75
End: 2025-03-05
Payer: MEDICARE

## 2025-03-05 NOTE — TELEPHONE ENCOUNTER
CALLED PATIENT AND LEFT SECOND MESSAGE.    From: Flaca Menard  To: Agatha Crystal M.D.  Sent: 8/15/2020 11:56 AM PDT  Subject: Prescription Question    My Lisinopril and levothyroxine were not given the full years worth when originally sent to [facility]. I am about out and need them to be done with enough refills to go until next year. Flaca

## 2025-03-05 NOTE — TELEPHONE ENCOUNTER
Caller: Mary Deleon    Relationship: Self    Best call back number: [phone]      PATIENT RETURNED CALL TO RESCHEDULE INR  UNABLE TO WT TO OFFICE

## 2025-03-13 ENCOUNTER — ANTICOAGULATION VISIT (OUTPATIENT)
Dept: CARDIOLOGY | Facility: CLINIC | Age: 75
End: 2025-03-13
Payer: MEDICARE

## 2025-03-13 VITALS
DIASTOLIC BLOOD PRESSURE: 66 MMHG | SYSTOLIC BLOOD PRESSURE: 136 MMHG | WEIGHT: 189 LBS | HEART RATE: 60 BPM | BODY MASS INDEX: 35.71 KG/M2

## 2025-03-13 DIAGNOSIS — I48.21 PERMANENT ATRIAL FIBRILLATION: Primary | Chronic | ICD-10-CM

## 2025-03-13 DIAGNOSIS — Z79.01 LONG TERM (CURRENT) USE OF ANTICOAGULANTS: ICD-10-CM

## 2025-03-13 LAB — INR PPP: 2.3 (ref 0.9–1.1)

## 2025-03-13 PROCEDURE — 36416 COLLJ CAPILLARY BLOOD SPEC: CPT | Performed by: INTERNAL MEDICINE

## 2025-03-13 PROCEDURE — 85610 PROTHROMBIN TIME: CPT | Performed by: INTERNAL MEDICINE

## 2025-04-10 ENCOUNTER — TELEPHONE (OUTPATIENT)
Dept: CARDIOLOGY | Facility: CLINIC | Age: 75
End: 2025-04-10
Payer: MEDICARE

## 2025-04-10 NOTE — TELEPHONE ENCOUNTER
Hub staff attempted to follow warm transfer process and was unsuccessful     Caller: Mary Deleon    Relationship to patient: Self    Best call back number: [phone]    Patient is needing: TO RSD INR APPT THAT WAS FOR TODAY

## 2025-04-17 ENCOUNTER — ANTICOAGULATION VISIT (OUTPATIENT)
Dept: CARDIOLOGY | Facility: CLINIC | Age: 75
End: 2025-04-17
Payer: MEDICARE

## 2025-04-17 VITALS
WEIGHT: 192 LBS | SYSTOLIC BLOOD PRESSURE: 145 MMHG | BODY MASS INDEX: 36.28 KG/M2 | HEART RATE: 64 BPM | DIASTOLIC BLOOD PRESSURE: 62 MMHG

## 2025-04-17 DIAGNOSIS — Z79.01 LONG TERM (CURRENT) USE OF ANTICOAGULANTS: ICD-10-CM

## 2025-04-17 DIAGNOSIS — I48.21 PERMANENT ATRIAL FIBRILLATION: Primary | Chronic | ICD-10-CM

## 2025-04-17 LAB — INR PPP: 2.2 (ref 0.9–1.1)

## 2025-05-21 ENCOUNTER — ANTICOAGULATION VISIT (OUTPATIENT)
Dept: CARDIOLOGY | Facility: CLINIC | Age: 75
End: 2025-05-21
Payer: MEDICARE

## 2025-05-21 VITALS
HEART RATE: 51 BPM | WEIGHT: 189 LBS | DIASTOLIC BLOOD PRESSURE: 58 MMHG | SYSTOLIC BLOOD PRESSURE: 141 MMHG | BODY MASS INDEX: 35.71 KG/M2

## 2025-05-21 DIAGNOSIS — Z79.01 LONG TERM (CURRENT) USE OF ANTICOAGULANTS: ICD-10-CM

## 2025-05-21 DIAGNOSIS — I48.21 PERMANENT ATRIAL FIBRILLATION: Primary | Chronic | ICD-10-CM

## 2025-05-21 LAB — INR PPP: 3.4 (ref 0.9–1.1)

## 2025-05-21 PROCEDURE — 85610 PROTHROMBIN TIME: CPT | Performed by: INTERNAL MEDICINE

## 2025-05-21 PROCEDURE — 36416 COLLJ CAPILLARY BLOOD SPEC: CPT | Performed by: INTERNAL MEDICINE

## 2025-05-21 NOTE — PROGRESS NOTES
Pts INR was slightly elevated at 3.4. Pt had antibiotics last week for dental work. No other changes noted. Pt will hold Warfarin tonight, then resume current dosage and recheck in a month. [email or web address]

## 2025-07-05 NOTE — PROGRESS NOTES
Encounter Date:07/11/2025        Patient ID: Mary Deleon is a 74 y.o. female.      Chief Complaint:      History of Present Illness:  History of Present Illness  Mary Deleon is a 74 y.o. female with a history of coronary artery disease status post CABG in 2017 with MAZE procedure, hypertension, hyperlipidemia, atrial flutter/fibrillation status post ablation x 2 by Dr. Rios, chronic HFpEF, mitral regurgitation and hypothyroidism who  underwent outpatient transesophageal echocardiogram and cardiac catheterization.    She complains of leg swelling, lightheadedness, dizziness and paresthesias.    Current cardiac medications include amlodipine, aspirin, atorvastatin, Imdur, Toprol-XL and warfarin     The following portions of the patient's history were reviewed and updated as appropriate: allergies, current medications, past family history, past medical history, past social history, past surgical history, and problem list.    Review of systems:  Review of Systems   Constitutional: Positive for malaise/fatigue.   Cardiovascular:  Positive for leg swelling. Negative for chest pain, dyspnea on exertion and palpitations.   Respiratory:  Negative for cough and shortness of breath.    Gastrointestinal:  Negative for abdominal pain, nausea and vomiting.   Neurological:  Positive for dizziness, light-headedness, numbness and paresthesias. Negative for headaches and weakness.   All other systems reviewed and are negative.        Past Medical History:  Past Medical History:   Diagnosis Date    Abnormal nuclear stress test 12/11/2020    Added automatically from request for surgery [id number]      Arthritis     Atrial fibrillation     Atrial flutter 10/18/2018    Chronic heart failure with preserved ejection fraction (HFpEF) 08/06/2024    COPD (chronic obstructive pulmonary disease)     Coronary artery disease     Depression     Diverticulitis     Hyperlipidemia     Hypertension     Mitral valve prolapse      "Nonrheumatic mitral valve regurgitation 01/02/2019       Past Surgical History:  Past Surgical History:   Procedure Laterality Date    CARDIAC CATHETERIZATION N/A 01/11/2021    Procedure: Coronary angiography;  Surgeon: Al Garcia MD;  Location: [facility] CATH INVASIVE LOCATION;  Service: Cardiovascular;  Laterality: N/A;    CARDIAC CATHETERIZATION N/A 01/11/2021    Procedure: Saphenous Vein Graft;  Surgeon: Al Garcia MD;  Location: [facility] CATH INVASIVE LOCATION;  Service: Cardiovascular;  Laterality: N/A;    CARDIAC CATHETERIZATION N/A 12/2/2024    Procedure: Right and Left Heart Cath with Coronar Angiography;  Surgeon: Anand Madden MD;  Location: [facility] CATH INVASIVE LOCATION;  Service: Cardiovascular;  Laterality: N/A;    CATARACT EXTRACTION, BILATERAL Bilateral     HYSTERECTOMY      KNEE SURGERY      x2        Allergies:  Allergies   Allergen Reactions    Amlodipine Swelling     Leg swelling    Lasix [Furosemide] Dizziness    Lisinopril Dizziness    Omeprazole Hives    Sulfa Antibiotics Hives    Nitrofurantoin Other (See Comments)     .    Vancomycin Other (See Comments)     \"red man syndrome\" - turned red from head to toe    Codeine GI Intolerance    Naproxen Rash    Naproxen Sodium Rash    Tylenol With Codeine #3 [Acetaminophen-Codeine] GI Intolerance       Current Meds:     Current Outpatient Medications:     albuterol sulfate  (90 Base) MCG/ACT inhaler, Inhale 2 puffs Every 4 (Four) Hours As Needed., Disp: , Rfl:     amLODIPine (NORVASC) 5 MG tablet, Take 1 tablet by mouth Every Other Day., Disp: , Rfl:     amoxicillin (AMOXIL) 500 MG capsule, TAKE FOUR CAPSULES BY MOUTH ONE HOUR BEFORE APPOINTMENT, Disp: , Rfl:     aspirin 81 MG EC tablet, Take 1 tablet by mouth Daily., Disp: , Rfl:     atorvastatin (LIPITOR) 40 MG tablet, TAKE 1 TABLET BY MOUTH ONCE DAILY AT BEDTIME, Disp: 90 tablet, Rfl: 1    BREO ELLIPTA 100-25 MCG/INH inhaler, Inhale 1 puff Daily., Disp: , Rfl: " "5    Docusate Sodium (COLACE PO), Take 2 capsules by mouth 2 (two) times a day., Disp: , Rfl:     isosorbide dinitrate (ISORDIL) 10 MG tablet, Take 1 tablet by mouth 3 (Three) Times a Day., Disp: 270 tablet, Rfl: 3    levothyroxine (SYNTHROID, LEVOTHROID) 25 MCG tablet, Take 1 tablet by mouth Daily., Disp: , Rfl:     metFORMIN (GLUCOPHAGE) 500 MG tablet, Take 1 tablet by mouth 2 (Two) Times a Day., Disp: , Rfl: 1    metoprolol succinate XL (TOPROL-XL) 25 MG 24 hr tablet, Take 1 tablet by mouth once daily, Disp: 90 tablet, Rfl: 3    montelukast (SINGULAIR) 10 MG tablet, Take 1 tablet by mouth Daily., Disp: , Rfl:     Omega-3 Fatty Acids (fish oil) 1000 MG capsule capsule, Take 1 capsule by mouth Daily With Breakfast., Disp: , Rfl:     ONETOUCH DELICA LANCETS FINE misc, USE TO CHECK GLUCOSE TWICE DAILY, Disp: , Rfl: 5    ONETOUCH VERIO test strip, USE TO CHECK GLUCOSE TWICE DAILY, Disp: , Rfl: 3    Probiotic Product (PROBIOTIC-10 PO), Take  by mouth., Disp: , Rfl:     SPIRIVA RESPIMAT 1.25 MCG/ACT aerosol solution inhaler, Inhale 2 puffs Daily., Disp: , Rfl: 1    vitamin D3 125 MCG (5000 UT) capsule capsule, Take 1 capsule by mouth 2 (Two) Times a Day., Disp: , Rfl:     warfarin (COUMADIN) 4 MG tablet, Take one tablet by mouth daily except Tuesday or as directed, Disp: 90 tablet, Rfl: 0    Social History:   Social History     Tobacco Use    Smoking status: Never     Passive exposure: Yes    Smokeless tobacco: Never   Substance Use Topics    Alcohol use: No        Family History:  Family History   Problem Relation Age of Onset    Heart disease Mother     Pneumonia Mother     Asthma Father     Alzheimer's disease Father     Heart disease Father             Objective:       Physical Exam    Vital signs:  /47 (BP Location: Right arm, Patient Position: Sitting, Cuff Size: Adult)   Ht 149.9 cm (59\")   Wt 85.7 kg (189 lb)   LMP  (LMP Unknown)   SpO2 96%   BMI 38.17 kg/m²     The patient is alert, oriented and in " no distress.    Vital signs as noted above.    Head and neck revealed no carotid bruits or jugular venous distension.  No thyromegaly or lymphadenopathy is present.    Lungs clear.  No wheezing.  Breath sounds are normal bilaterally.    Heart normal first and second heart sounds.  No murmur..  No pericardial rub is present.  No gallop is present.    Abdomen soft and nontender.  No organomegaly is present.    Extremities revealed good peripheral pulses without any pedal edema.    Skin warm and dry.    Musculoskeletal system is grossly normal.    CNS grossly normal.        Recent labs reviewed:    CBC        BMP        CMP       Creatinine Clearance  CrCl cannot be calculated (Patient's most recent lab result is older than the maximum 30 days allowed.).    BNP        TROPONIN        CoAg          Radiology  No radiology results for the last day      Cardiology results reviewed:    EKG  Procedures    Stress test      Echocardiogram  Results for orders placed during the hospital encounter of 12/02/24    Adult Transesophageal Echo (HERBERT) W/ Cont if Necessary Per Protocol 12/02/2024  2:58 PM    Interpretation Summary    Left ventricular ejection fraction appears to be 56 - 60%.    The right ventricular cavity is dilated.    The left atrial cavity is dilated.  Left atrial appendage has no thrombus and is patent.    The right atrial cavity is dilated.    Estimated right ventricular systolic pressure from tricuspid regurgitation is mildly elevated (35-45 mmHg).    There is moderate plaque in the ascending aorta present.    Mild to moderate mitral valve regurgitation is noted    Moderate ASD with left-to-right shunt is noted.      Cardiac catheterization  Results for orders placed during the hospital encounter of 12/02/24    Cardiac Catheterization/Vascular Study    Conclusion    Anand Madden M.D. (Attending Cardiologist)    PROCEDURE PERFORMED  Ultrasound guided vascular access  Right heart catheterization  Coronary  Angiogram  Bypass angiography  Left Heart Catheterization  Moderate Sedation    INDICATIONS FOR PROCEDURE  73 years old woman with shortness of breath, coronary artery disease, pulmonary hypertension status post CABG, mitral valve regurgitation, PFO/ASD.  After discussing the risk and benefit of the procedure she was offered a right and left heart cath.    PROCEDURE IN DETAIL  Informed consent was obtained from the patient after explaining the risks, benefits, and alternative options of the procedure. After obtaining informed consent, the patient was brought to the cath lab and was prepped in a sterile fashion. Lidocaine 2% was used for local anesthesia into the right femoral venous access site. Right femoral vein was accessed using the micropuncture needle under ultrasound guidance and micropuncture wire advanced under flouroscopy. A 7 Tongan vascular sheath was put into place percutaneously over guide-wire. Guide wires were removed. A 6Fr swan tobias catheter was advanced to wedge position. RA, RV and PA and wedge pressures were recorded.  PA sat and arterial sats recorded.  The patient tolerated the procedure well without any complications.    Lidocaine 2% was used for local anesthesia into the right femoral arterial access site. The right femoral artery was accessed with a micropuncture needle via modified Seldinger technique under ultrasound guidance. A 6F was inserted successfully.  Afterwards, 6F JR4 and JL4 diagnostic catheters were advanced over a wire into the ascending aorta and were used to engage the ostia of the left main and RCA respectively. JR4 used to cross the AV and obtain LV pressures and gradient across the AV measured via pullback technique. Images of the right and left coronary systems were obtained.  JR4 catheter was used to selectively engage the ostium of left subclavian artery.  This was exchanged for internal mammary artery catheter and selective LIMA angiography was then performed.  JR4  catheter was also used to selectively engage the vein graft supplying the RCA and left circumflex arteries.    All the catheters were exchanged over a wire and subsequently removed. The patient tolerated the procedure well without any complications. The pictures were reviewed at the end of the procedure. A Mynx closure device was applied on the venous access site and 6 Upper sorbian Angio-Seal was applied on the arterial access site.    HEMODYNAMICS    RHC  RA 10/10, 8 mmHg  RV 43/3, 10 mmHg  PA 43/13, 25 mmHg  PCW 30/18, 18 mmHg  AO Sat 96%  PA Sat 69%    Nica CO 5.6 L/min    Nica CI 3.2 L/min/m²    SVR: 1298 dynes-sec/cm5 (normal 700-1600)  PVR: 100 dynes-sec/cm5 (normal )    LHC  LV: 157/6, 12 mmHg  AO: 157/62, 98 mmHg  Gradient: No significant gradient across the aortic valve    FINDINGS  Coronary Angiogram    Codominant circulation    Left main: Left main is a large caliber vessel which gives rise to the Left Anterior Descending and the Left circumflex.  Left main coronary artery is angiographically free from any significant disease.    Left Anterior Descending Artery: LAD has 50% stenosis in the proximal segment, 90% stenosis in the midsegment.  Distal LAD is 100% occluded    Left Circumflex: Left circumflex artery gives rise to marginals.  Proximal left circumflex has 40% stenosis, mid left circumflex has 50% stenosis.  Left circumflex is a co-dominant vessel that also gives rise to LPDA.  This is unchanged when compared to previous cardiac catheterization in 2021.    Right Coronary Artery: The RCA is a large caliber dominant vessel gives rise to PDA and PLV.  Proximal RCA has 50% stenosis, mid RCA has 60% stenosis.  PDA retrogradely fills the vein graft.    Bypass angiography    LIMA to LAD: Widely patent at the origin, body and anastomosis.  Competitive flow noted.    SVG to RCA: Proximal 30% stenosis.  The remaining graft is angiographically free from any significant disease and supplies the PDA which  retrogradely fills the entire RCA.    SVG to left circumflex: Totally occluded    ESTIMATED BLOOD LOSS:  10 ml    COMPLICATIONS:  None    PROCEDURE DATA:  Contrast Used: 40 cc    Sedation Time: 45 minutes    IMPRESSIONS  Patent LIMA to LAD, vein graft to RCA.  Patent left circumflex artery.  Normal LVEDP, mildly elevated wedge pressure  Normal cardiac output and index.  Mild pulmonary hypertension    RECOMMENDATIONS  -Lasix as needed  -Continue medical management of HFpEF and CAD    Electronically signed by Anand Madden MD, 12/02/24, 2:54 PM EST.          Assessment and Plan       Diagnoses and all orders for this visit:    1. Permanent atrial fibrillation (Primary)    2. Coronary artery disease of native artery of native heart with stable angina pectoris    3. Essential hypertension    4. Mixed hyperlipidemia    5. Chronic heart failure with preserved ejection fraction (HFpEF)    6. ASD (atrial septal defect)    7. Obesity (BMI 30-39.9)    8. Type 2 diabetes mellitus without complication, without long-term current use of insulin    9. Nonrheumatic mitral valve regurgitation    10. Dyslipidemia    11. Primary hypertension    12. Essential (primary) hypertension    13. Obstructive sleep apnea    14. Coronary artery disease involving native coronary artery of native heart without angina pectoris    15. Paroxysmal atrial fibrillation    16. Hypothyroidism, unspecified type    17. Acute on chronic heart failure with preserved ejection fraction (HFpEF)         Coronary artery disease of native artery of native heart with stable angina pectoris  History of CABG  Recent cardiac catheterization showed patent LIMA to LAD, vein graft to RCA, patent left circumflex artery, normal LVEDP, mildly elevated wedge pressure, normal cardiac output and index, and mild pulmonary hypertension.  Continue aspirin, high intensity statin, isosorbide dinitrate and beta blocker     Mixed hyperlipidemia  Continue atorvastatin   Goal LDL <70      Essential hypertension, chronic  Blood pressure and heart rate are well-controlled  She has been intolerant to multiple medications, including lisinopril, Lasix, and amlodipine.   Continue nitrate and beta blocker   She takes amlodipine every other day.     Chronic HFpEF  Mitral regurgitation  Echocardiogram done July 2024 shows preserved LV function, diastolic dysfunction and moderate mitral valve regurgitation.  HERBERT showed LVEF 56-60%, dilated RV, LA, and RA, left atrial appendage without thrombus, estimated RVSP 35-45 mmHg, moderate plaque in the ascending aorta, mild to moderate MR, and moderate ASD with left-to-right shunt.   Continue beta blocker and nitrate  Patient reluctant to start SGLT2-I  She will take Lasix as needed for leg swelling/weight gain.  Daily weight recommended  2 g sodium diet recommended     Permanent atrial fibrillation  Followed by EP, Dr. Rios  Continue beta blocker for rate control  [id number] score 6  Continue warfarin   Goal INR 2.0-3.0   Will check INR today in the office     ASD  Secondary to previous ablation procedure     Type 2 diabetes mellitus without complication, without long-term current use of insulin  On metformin      Hypothyroidism, unspecified type  On levothyroxine      Obesity   BMI is 38.  She weighs 189 pounds.  Lifestyle modifications recommended  We set a target for 10 pound weight loss by next visit.  Discouraged her from eating excessive candy

## 2025-07-11 ENCOUNTER — OFFICE VISIT (OUTPATIENT)
Dept: CARDIOLOGY | Facility: CLINIC | Age: 75
End: 2025-07-11
Payer: MEDICARE

## 2025-07-11 ENCOUNTER — ANTICOAGULATION VISIT (OUTPATIENT)
Dept: CARDIOLOGY | Facility: CLINIC | Age: 75
End: 2025-07-11
Payer: MEDICARE

## 2025-07-11 VITALS
WEIGHT: 189 LBS | DIASTOLIC BLOOD PRESSURE: 47 MMHG | SYSTOLIC BLOOD PRESSURE: 108 MMHG | OXYGEN SATURATION: 96 % | HEIGHT: 59 IN | BODY MASS INDEX: 38.1 KG/M2

## 2025-07-11 VITALS — DIASTOLIC BLOOD PRESSURE: 47 MMHG | SYSTOLIC BLOOD PRESSURE: 108 MMHG | BODY MASS INDEX: 38.17 KG/M2 | WEIGHT: 189 LBS

## 2025-07-11 DIAGNOSIS — Z79.01 LONG TERM (CURRENT) USE OF ANTICOAGULANTS: ICD-10-CM

## 2025-07-11 DIAGNOSIS — I10 ESSENTIAL HYPERTENSION: ICD-10-CM

## 2025-07-11 DIAGNOSIS — Q21.10 ASD (ATRIAL SEPTAL DEFECT): ICD-10-CM

## 2025-07-11 DIAGNOSIS — I25.10 CORONARY ARTERY DISEASE INVOLVING NATIVE CORONARY ARTERY OF NATIVE HEART WITHOUT ANGINA PECTORIS: ICD-10-CM

## 2025-07-11 DIAGNOSIS — E03.9 HYPOTHYROIDISM, UNSPECIFIED TYPE: ICD-10-CM

## 2025-07-11 DIAGNOSIS — I50.32 CHRONIC HEART FAILURE WITH PRESERVED EJECTION FRACTION (HFPEF): ICD-10-CM

## 2025-07-11 DIAGNOSIS — I48.21 PERMANENT ATRIAL FIBRILLATION: Primary | Chronic | ICD-10-CM

## 2025-07-11 DIAGNOSIS — I48.0 PAROXYSMAL ATRIAL FIBRILLATION: ICD-10-CM

## 2025-07-11 DIAGNOSIS — E78.2 MIXED HYPERLIPIDEMIA: ICD-10-CM

## 2025-07-11 DIAGNOSIS — E11.9 TYPE 2 DIABETES MELLITUS WITHOUT COMPLICATION, WITHOUT LONG-TERM CURRENT USE OF INSULIN: ICD-10-CM

## 2025-07-11 DIAGNOSIS — I48.21 PERMANENT ATRIAL FIBRILLATION: Primary | ICD-10-CM

## 2025-07-11 DIAGNOSIS — G47.33 OBSTRUCTIVE SLEEP APNEA: ICD-10-CM

## 2025-07-11 DIAGNOSIS — I25.118 CORONARY ARTERY DISEASE OF NATIVE ARTERY OF NATIVE HEART WITH STABLE ANGINA PECTORIS: ICD-10-CM

## 2025-07-11 DIAGNOSIS — I10 ESSENTIAL (PRIMARY) HYPERTENSION: ICD-10-CM

## 2025-07-11 DIAGNOSIS — E66.9 OBESITY (BMI 30-39.9): ICD-10-CM

## 2025-07-11 DIAGNOSIS — E78.5 DYSLIPIDEMIA: ICD-10-CM

## 2025-07-11 DIAGNOSIS — I10 PRIMARY HYPERTENSION: ICD-10-CM

## 2025-07-11 DIAGNOSIS — I50.33 ACUTE ON CHRONIC HEART FAILURE WITH PRESERVED EJECTION FRACTION (HFPEF): ICD-10-CM

## 2025-07-11 DIAGNOSIS — I34.0 NONRHEUMATIC MITRAL VALVE REGURGITATION: ICD-10-CM

## 2025-07-11 LAB — INR PPP: 2.1 (ref 0.9–1.1)

## 2025-07-11 PROCEDURE — 36416 COLLJ CAPILLARY BLOOD SPEC: CPT | Performed by: INTERNAL MEDICINE

## 2025-07-11 PROCEDURE — 85610 PROTHROMBIN TIME: CPT | Performed by: INTERNAL MEDICINE

## 2025-07-11 RX ORDER — AMLODIPINE BESYLATE 5 MG/1
5 TABLET ORAL EVERY OTHER DAY
Qty: 45 TABLET | Refills: 3 | Status: SHIPPED | OUTPATIENT
Start: 2025-07-11

## 2025-07-11 RX ORDER — WARFARIN SODIUM 3 MG/1
TABLET ORAL
Qty: 25 TABLET | Refills: 0 | Status: SHIPPED | OUTPATIENT
Start: 2025-07-11

## 2025-07-11 RX ORDER — AMOXICILLIN 500 MG/1
CAPSULE ORAL
COMMUNITY
Start: 2025-05-07

## 2025-07-11 RX ORDER — AMLODIPINE BESYLATE 5 MG/1
5 TABLET ORAL EVERY OTHER DAY
COMMUNITY
End: 2025-07-11 | Stop reason: SDUPTHER

## 2025-07-11 NOTE — TELEPHONE ENCOUNTER
Pt needs refill on Warfarin 3 mg tabs. Takes 2 tabs on Sunday. Sent to [facility] in [place]. Polly

## 2025-08-13 RX ORDER — ATORVASTATIN CALCIUM 40 MG/1
40 TABLET, FILM COATED ORAL
Qty: 90 TABLET | Refills: 0 | Status: SHIPPED | OUTPATIENT
Start: 2025-08-13

## (undated) DEVICE — CATH DIAG IMPULSE PIG .056 6F 110CM

## (undated) DEVICE — ANGIO-SEAL VIP VASCULAR CLOSURE DEVICE: Brand: ANGIO-SEAL

## (undated) DEVICE — CATH DIAG IMPULSE FL4 6F 100CM

## (undated) DEVICE — PK TRY HEART CATH 50

## (undated) DEVICE — ST ACC MICROPUNCTURE STFF/CANN PLAT/TP 4F 21G 40CM

## (undated) DEVICE — PINNACLE INTRODUCER SHEATH: Brand: PINNACLE

## (undated) DEVICE — CVR PROB ULTRASND CIVFLEX GEN/PURP TELESCP/FOLD 5.5X96IN LF

## (undated) DEVICE — MYNXGRIP 6F/7F: Brand: MYNXGRIP

## (undated) DEVICE — SHT AIR TRANSFR COMFRT GLIDE LAT 40X80IN

## (undated) DEVICE — CATH DIAG IMPULSE FR4 6F 100CM

## (undated) DEVICE — GW PTFE EMERALD HEPCOAT FC J TIP STD .035 3MM 150CM

## (undated) DEVICE — DGW .035 MC J3MM 150CM T H AMP: Brand: EMERALD

## (undated) DEVICE — DGW .035 FC J3MM 260CM TEF: Brand: EMERALD

## (undated) DEVICE — SWAN-GANZ THERMODILUTION CATHETER: Brand: SWAN-GANZ

## (undated) DEVICE — CATH DIAG IMPULSE IM 5F 100CM

## (undated) DEVICE — SKIN PREP TRAY W/CHG: Brand: MEDLINE INDUSTRIES, INC.